# Patient Record
Sex: FEMALE | Race: WHITE | Employment: OTHER | URBAN - METROPOLITAN AREA
[De-identification: names, ages, dates, MRNs, and addresses within clinical notes are randomized per-mention and may not be internally consistent; named-entity substitution may affect disease eponyms.]

---

## 2017-03-29 ENCOUNTER — TELEPHONE (OUTPATIENT)
Dept: PULMONOLOGY | Age: 67
End: 2017-03-29

## 2018-03-05 ENCOUNTER — TELEPHONE (OUTPATIENT)
Dept: PULMONOLOGY | Age: 68
End: 2018-03-05

## 2018-03-05 DIAGNOSIS — J44.1 COPD EXACERBATION (HCC): Primary | ICD-10-CM

## 2018-04-20 ENCOUNTER — OFFICE VISIT (OUTPATIENT)
Dept: PULMONOLOGY | Age: 68
End: 2018-04-20

## 2018-04-20 ENCOUNTER — HOSPITAL ENCOUNTER (OUTPATIENT)
Dept: CARDIAC REHAB | Age: 68
Discharge: HOME | End: 2018-04-20
Attending: INTERNAL MEDICINE | Admitting: INTERNAL MEDICINE

## 2018-04-20 VITALS
DIASTOLIC BLOOD PRESSURE: 80 MMHG | BODY MASS INDEX: 33.84 KG/M2 | HEIGHT: 63 IN | SYSTOLIC BLOOD PRESSURE: 155 MMHG | TEMPERATURE: 97.8 F | RESPIRATION RATE: 16 BRPM | WEIGHT: 191 LBS | HEART RATE: 81 BPM | OXYGEN SATURATION: 95 %

## 2018-04-20 DIAGNOSIS — E55.9 VITAMIN D DEFICIENCY: ICD-10-CM

## 2018-04-20 DIAGNOSIS — F32.A FATIGUE DUE TO DEPRESSION: ICD-10-CM

## 2018-04-20 DIAGNOSIS — R91.1 PULMONARY NODULE SEEN ON IMAGING STUDY: ICD-10-CM

## 2018-04-20 DIAGNOSIS — R53.83 FATIGUE DUE TO DEPRESSION: ICD-10-CM

## 2018-04-20 DIAGNOSIS — J96.11 CHRONIC HYPOXEMIC RESPIRATORY FAILURE (HCC): ICD-10-CM

## 2018-04-20 DIAGNOSIS — G47.10 HYPERSOMNIA: ICD-10-CM

## 2018-04-20 DIAGNOSIS — Z71.6 TOBACCO ABUSE COUNSELING: ICD-10-CM

## 2018-04-20 DIAGNOSIS — J44.9 COPD, MODERATE (HCC): Primary | ICD-10-CM

## 2018-04-20 LAB
TSH REFLEX: 2.75 UIU/ML (ref 0.27–4.2)
VITAMIN D 25-HYDROXY: 21.4 NG/ML

## 2018-04-20 PROCEDURE — 4004F PT TOBACCO SCREEN RCVD TLK: CPT | Performed by: INTERNAL MEDICINE

## 2018-04-20 PROCEDURE — 4040F PNEUMOC VAC/ADMIN/RCVD: CPT | Performed by: INTERNAL MEDICINE

## 2018-04-20 PROCEDURE — G8400 PT W/DXA NO RESULTS DOC: HCPCS | Performed by: INTERNAL MEDICINE

## 2018-04-20 PROCEDURE — G8427 DOCREV CUR MEDS BY ELIG CLIN: HCPCS | Performed by: INTERNAL MEDICINE

## 2018-04-20 PROCEDURE — 3017F COLORECTAL CA SCREEN DOC REV: CPT | Performed by: INTERNAL MEDICINE

## 2018-04-20 PROCEDURE — 3014F SCREEN MAMMO DOC REV: CPT | Performed by: INTERNAL MEDICINE

## 2018-04-20 PROCEDURE — G8417 CALC BMI ABV UP PARAM F/U: HCPCS | Performed by: INTERNAL MEDICINE

## 2018-04-20 PROCEDURE — G8926 SPIRO NO PERF OR DOC: HCPCS | Performed by: INTERNAL MEDICINE

## 2018-04-20 PROCEDURE — 99214 OFFICE O/P EST MOD 30 MIN: CPT | Performed by: INTERNAL MEDICINE

## 2018-04-20 PROCEDURE — 1090F PRES/ABSN URINE INCON ASSESS: CPT | Performed by: INTERNAL MEDICINE

## 2018-04-20 PROCEDURE — 94618 PULMONARY STRESS TESTING: CPT | Performed by: INTERNAL MEDICINE

## 2018-04-20 PROCEDURE — 1123F ACP DISCUSS/DSCN MKR DOCD: CPT | Performed by: INTERNAL MEDICINE

## 2018-04-20 PROCEDURE — 3023F SPIROM DOC REV: CPT | Performed by: INTERNAL MEDICINE

## 2018-04-20 RX ORDER — AMLODIPINE BESYLATE 5 MG/1
5 TABLET ORAL DAILY
COMMUNITY

## 2018-04-20 RX ORDER — ALBUTEROL SULFATE 90 UG/1
2 AEROSOL, METERED RESPIRATORY (INHALATION) EVERY 6 HOURS PRN
COMMUNITY
End: 2019-11-30 | Stop reason: ALTCHOICE

## 2018-04-23 ENCOUNTER — TELEPHONE (OUTPATIENT)
Dept: PULMONOLOGY | Age: 68
End: 2018-04-23

## 2018-04-23 DIAGNOSIS — J44.9 COPD, MODERATE (HCC): ICD-10-CM

## 2018-04-25 ENCOUNTER — TELEPHONE (OUTPATIENT)
Dept: PULMONOLOGY | Age: 68
End: 2018-04-25

## 2018-05-15 ENCOUNTER — HOSPITAL ENCOUNTER (OUTPATIENT)
Dept: CT IMAGING | Age: 68
Discharge: HOME | End: 2018-05-15
Admitting: INTERNAL MEDICINE

## 2018-05-15 DIAGNOSIS — R91.1 PULMONARY NODULE SEEN ON IMAGING STUDY: ICD-10-CM

## 2018-05-15 DIAGNOSIS — R91.1 SOLITARY PULMONARY NODULE: ICD-10-CM

## 2018-06-03 ENCOUNTER — HOSPITAL ENCOUNTER (OUTPATIENT)
Dept: OTHER | Age: 68
Discharge: HOME | End: 2018-05-15
Attending: INTERNAL MEDICINE | Admitting: INTERNAL MEDICINE

## 2018-06-11 ENCOUNTER — TELEPHONE (OUTPATIENT)
Dept: PULMONOLOGY | Age: 68
End: 2018-06-11

## 2018-06-30 DIAGNOSIS — E55.9 VITAMIN D DEFICIENCY: ICD-10-CM

## 2018-07-02 RX ORDER — CHOLECALCIFEROL (VITAMIN D3) 1250 MCG
CAPSULE ORAL
Qty: 4 CAPSULE | Refills: 3 | Status: SHIPPED | OUTPATIENT
Start: 2018-07-02

## 2018-07-05 ENCOUNTER — TELEPHONE (OUTPATIENT)
Dept: PULMONOLOGY | Age: 68
End: 2018-07-05

## 2019-01-28 ENCOUNTER — HOSPITAL ENCOUNTER (EMERGENCY)
Age: 69
Discharge: HOME | End: 2019-01-28
Payer: COMMERCIAL

## 2019-01-28 ENCOUNTER — APPOINTMENT (OUTPATIENT)
Dept: GENERAL RADIOLOGY | Age: 69
End: 2019-01-28
Payer: COMMERCIAL

## 2019-01-28 VITALS
DIASTOLIC BLOOD PRESSURE: 74 MMHG | TEMPERATURE: 98.6 F | HEART RATE: 98 BPM | BODY MASS INDEX: 33.98 KG/M2 | OXYGEN SATURATION: 90 % | RESPIRATION RATE: 18 BRPM | SYSTOLIC BLOOD PRESSURE: 131 MMHG | WEIGHT: 191.8 LBS | HEIGHT: 63 IN

## 2019-01-28 DIAGNOSIS — R03.0 ELEVATED BLOOD PRESSURE READING: ICD-10-CM

## 2019-01-28 DIAGNOSIS — M54.6 ACUTE BILATERAL THORACIC BACK PAIN: Primary | ICD-10-CM

## 2019-01-28 DIAGNOSIS — R05.9 COUGH: ICD-10-CM

## 2019-01-28 DIAGNOSIS — R91.1 PULMONARY NODULE: ICD-10-CM

## 2019-01-28 PROCEDURE — 6370000000 HC RX 637 (ALT 250 FOR IP): Performed by: PHYSICIAN ASSISTANT

## 2019-01-28 PROCEDURE — 94664 DEMO&/EVAL PT USE INHALER: CPT

## 2019-01-28 PROCEDURE — 94640 AIRWAY INHALATION TREATMENT: CPT

## 2019-01-28 PROCEDURE — 71045 X-RAY EXAM CHEST 1 VIEW: CPT

## 2019-01-28 PROCEDURE — 99283 EMERGENCY DEPT VISIT LOW MDM: CPT

## 2019-01-28 RX ORDER — BENZONATATE 200 MG/1
200 CAPSULE ORAL 3 TIMES DAILY PRN
Qty: 20 CAPSULE | Refills: 0 | Status: SHIPPED | OUTPATIENT
Start: 2019-01-28 | End: 2019-11-30 | Stop reason: ALTCHOICE

## 2019-01-28 RX ORDER — IPRATROPIUM BROMIDE AND ALBUTEROL SULFATE 2.5; .5 MG/3ML; MG/3ML
3 SOLUTION RESPIRATORY (INHALATION) ONCE
Status: COMPLETED | OUTPATIENT
Start: 2019-01-28 | End: 2019-01-28

## 2019-01-28 RX ORDER — HYDROCODONE BITARTRATE AND ACETAMINOPHEN 5; 325 MG/1; MG/1
1 TABLET ORAL ONCE
Status: COMPLETED | OUTPATIENT
Start: 2019-01-28 | End: 2019-01-28

## 2019-01-28 RX ORDER — CYCLOBENZAPRINE HCL 10 MG
10 TABLET ORAL ONCE
Status: COMPLETED | OUTPATIENT
Start: 2019-01-28 | End: 2019-01-28

## 2019-01-28 RX ORDER — CYCLOBENZAPRINE HCL 10 MG
10 TABLET ORAL 3 TIMES DAILY PRN
Qty: 20 TABLET | Refills: 0 | Status: SHIPPED | OUTPATIENT
Start: 2019-01-28 | End: 2019-03-08

## 2019-01-28 RX ORDER — NAPROXEN 500 MG/1
500 TABLET ORAL 2 TIMES DAILY PRN
Qty: 30 TABLET | Refills: 0 | Status: SHIPPED | OUTPATIENT
Start: 2019-01-28 | End: 2019-01-28

## 2019-01-28 RX ORDER — PREDNISONE 20 MG/1
60 TABLET ORAL ONCE
Status: COMPLETED | OUTPATIENT
Start: 2019-01-28 | End: 2019-01-28

## 2019-01-28 RX ORDER — PREDNISONE 20 MG/1
40 TABLET ORAL DAILY
Qty: 8 TABLET | Refills: 0 | Status: SHIPPED | OUTPATIENT
Start: 2019-01-28 | End: 2019-02-01

## 2019-01-28 RX ADMIN — IPRATROPIUM BROMIDE AND ALBUTEROL SULFATE 3 AMPULE: .5; 3 SOLUTION RESPIRATORY (INHALATION) at 18:18

## 2019-01-28 RX ADMIN — PREDNISONE 60 MG: 20 TABLET ORAL at 18:46

## 2019-01-28 RX ADMIN — HYDROCODONE BITARTRATE AND ACETAMINOPHEN 1 TABLET: 5; 325 TABLET ORAL at 18:47

## 2019-01-28 RX ADMIN — CYCLOBENZAPRINE HYDROCHLORIDE 10 MG: 10 TABLET, FILM COATED ORAL at 18:45

## 2019-01-28 ASSESSMENT — PAIN SCALES - GENERAL
PAINLEVEL_OUTOF10: 7
PAINLEVEL_OUTOF10: 7
PAINLEVEL_OUTOF10: 9
PAINLEVEL_OUTOF10: 9

## 2019-01-28 ASSESSMENT — ENCOUNTER SYMPTOMS
VOMITING: 0
SHORTNESS OF BREATH: 1
NAUSEA: 0
COUGH: 1
ABDOMINAL PAIN: 0
WHEEZING: 1
BACK PAIN: 1

## 2019-01-28 ASSESSMENT — PAIN DESCRIPTION - PAIN TYPE: TYPE: ACUTE PAIN

## 2019-01-28 ASSESSMENT — PAIN DESCRIPTION - LOCATION: LOCATION: BACK

## 2019-01-28 ASSESSMENT — PAIN DESCRIPTION - DESCRIPTORS: DESCRIPTORS: ACHING

## 2019-02-15 ENCOUNTER — HOSPITAL ENCOUNTER (OUTPATIENT)
Age: 69
Discharge: HOME | End: 2019-02-15
Payer: COMMERCIAL

## 2019-02-15 ENCOUNTER — HOSPITAL ENCOUNTER (OUTPATIENT)
Dept: GENERAL RADIOLOGY | Age: 69
Discharge: HOME | End: 2019-02-15
Payer: COMMERCIAL

## 2019-02-15 DIAGNOSIS — M79.605 LEFT LEG PAIN: ICD-10-CM

## 2019-02-15 PROCEDURE — 73560 X-RAY EXAM OF KNEE 1 OR 2: CPT

## 2019-02-15 PROCEDURE — 73502 X-RAY EXAM HIP UNI 2-3 VIEWS: CPT

## 2019-03-08 ENCOUNTER — HOSPITAL ENCOUNTER (EMERGENCY)
Age: 69
Discharge: HOME | End: 2019-03-08
Attending: EMERGENCY MEDICINE
Payer: COMMERCIAL

## 2019-03-08 VITALS
HEIGHT: 63 IN | WEIGHT: 192.02 LBS | TEMPERATURE: 98.8 F | BODY MASS INDEX: 34.02 KG/M2 | SYSTOLIC BLOOD PRESSURE: 164 MMHG | OXYGEN SATURATION: 97 % | DIASTOLIC BLOOD PRESSURE: 78 MMHG | HEART RATE: 98 BPM

## 2019-03-08 DIAGNOSIS — M54.50 ACUTE BILATERAL LOW BACK PAIN WITHOUT SCIATICA: Primary | ICD-10-CM

## 2019-03-08 DIAGNOSIS — F41.1 ANXIETY STATE: ICD-10-CM

## 2019-03-08 LAB
A/G RATIO: 1.4 (ref 1.1–2.2)
ALBUMIN SERPL-MCNC: 4.2 G/DL (ref 3.4–5)
ALP BLD-CCNC: 166 U/L (ref 40–129)
ALT SERPL-CCNC: 19 U/L (ref 10–40)
ANION GAP SERPL CALCULATED.3IONS-SCNC: 16 MMOL/L (ref 3–16)
AST SERPL-CCNC: 19 U/L (ref 15–37)
BASOPHILS ABSOLUTE: 0 K/UL (ref 0–0.2)
BASOPHILS RELATIVE PERCENT: 0.5 %
BILIRUB SERPL-MCNC: 0.3 MG/DL (ref 0–1)
BILIRUBIN URINE: NEGATIVE
BLOOD, URINE: NEGATIVE
BUN BLDV-MCNC: 10 MG/DL (ref 7–20)
CALCIUM SERPL-MCNC: 9.4 MG/DL (ref 8.3–10.6)
CHLORIDE BLD-SCNC: 102 MMOL/L (ref 99–110)
CLARITY: CLEAR
CO2: 25 MMOL/L (ref 21–32)
COLOR: YELLOW
CREAT SERPL-MCNC: 0.8 MG/DL (ref 0.6–1.2)
EOSINOPHILS ABSOLUTE: 0.1 K/UL (ref 0–0.6)
EOSINOPHILS RELATIVE PERCENT: 1.5 %
GFR AFRICAN AMERICAN: >60
GFR NON-AFRICAN AMERICAN: >60
GLOBULIN: 3.1 G/DL
GLUCOSE BLD-MCNC: 105 MG/DL (ref 70–99)
GLUCOSE URINE: NEGATIVE MG/DL
HCT VFR BLD CALC: 39.8 % (ref 36–48)
HEMOGLOBIN: 13.5 G/DL (ref 12–16)
KETONES, URINE: NEGATIVE MG/DL
LEUKOCYTE ESTERASE, URINE: NEGATIVE
LYMPHOCYTES ABSOLUTE: 0.7 K/UL (ref 1–5.1)
LYMPHOCYTES RELATIVE PERCENT: 15.5 %
MCH RBC QN AUTO: 31.8 PG (ref 26–34)
MCHC RBC AUTO-ENTMCNC: 33.9 G/DL (ref 31–36)
MCV RBC AUTO: 94 FL (ref 80–100)
MICROSCOPIC EXAMINATION: NORMAL
MONOCYTES ABSOLUTE: 0.5 K/UL (ref 0–1.3)
MONOCYTES RELATIVE PERCENT: 10.5 %
NEUTROPHILS ABSOLUTE: 3.3 K/UL (ref 1.7–7.7)
NEUTROPHILS RELATIVE PERCENT: 72 %
NITRITE, URINE: NEGATIVE
PDW BLD-RTO: 13.6 % (ref 12.4–15.4)
PH UA: 6 (ref 5–8)
PLATELET # BLD: 248 K/UL (ref 135–450)
PMV BLD AUTO: 7.7 FL (ref 5–10.5)
POTASSIUM SERPL-SCNC: 4.2 MMOL/L (ref 3.5–5.1)
PROTEIN UA: NEGATIVE MG/DL
RBC # BLD: 4.24 M/UL (ref 4–5.2)
SODIUM BLD-SCNC: 143 MMOL/L (ref 136–145)
SPECIFIC GRAVITY UA: 1.02 (ref 1–1.03)
TOTAL PROTEIN: 7.3 G/DL (ref 6.4–8.2)
URINE REFLEX TO CULTURE: NORMAL
URINE TYPE: NORMAL
UROBILINOGEN, URINE: 0.2 E.U./DL
WBC # BLD: 4.6 K/UL (ref 4–11)

## 2019-03-08 PROCEDURE — 81003 URINALYSIS AUTO W/O SCOPE: CPT

## 2019-03-08 PROCEDURE — 36415 COLL VENOUS BLD VENIPUNCTURE: CPT

## 2019-03-08 PROCEDURE — 6360000002 HC RX W HCPCS: Performed by: EMERGENCY MEDICINE

## 2019-03-08 PROCEDURE — 80053 COMPREHEN METABOLIC PANEL: CPT

## 2019-03-08 PROCEDURE — 99283 EMERGENCY DEPT VISIT LOW MDM: CPT

## 2019-03-08 PROCEDURE — 85025 COMPLETE CBC W/AUTO DIFF WBC: CPT

## 2019-03-08 PROCEDURE — 96372 THER/PROPH/DIAG INJ SC/IM: CPT

## 2019-03-08 RX ORDER — POLYETHYLENE GLYCOL 3350 17 G/17G
17 POWDER, FOR SOLUTION ORAL DAILY PRN
Qty: 527 G | Refills: 0 | Status: SHIPPED | OUTPATIENT
Start: 2019-03-08 | End: 2019-04-07

## 2019-03-08 RX ORDER — KETOROLAC TROMETHAMINE 30 MG/ML
60 INJECTION, SOLUTION INTRAMUSCULAR; INTRAVENOUS ONCE
Status: COMPLETED | OUTPATIENT
Start: 2019-03-08 | End: 2019-03-08

## 2019-03-08 RX ORDER — METHOCARBAMOL 750 MG/1
750 TABLET, FILM COATED ORAL 3 TIMES DAILY PRN
Qty: 15 TABLET | Refills: 0 | Status: SHIPPED | OUTPATIENT
Start: 2019-03-08 | End: 2019-03-13

## 2019-03-08 RX ADMIN — KETOROLAC TROMETHAMINE 60 MG: 30 INJECTION, SOLUTION INTRAMUSCULAR at 12:32

## 2019-03-08 ASSESSMENT — PAIN DESCRIPTION - ONSET: ONSET: GRADUAL

## 2019-03-08 ASSESSMENT — PAIN SCALES - GENERAL
PAINLEVEL_OUTOF10: 8
PAINLEVEL_OUTOF10: 8
PAINLEVEL_OUTOF10: 3

## 2019-03-08 ASSESSMENT — PAIN DESCRIPTION - LOCATION: LOCATION: FLANK

## 2019-03-08 ASSESSMENT — PAIN DESCRIPTION - PROGRESSION: CLINICAL_PROGRESSION: GRADUALLY WORSENING

## 2019-03-08 ASSESSMENT — PAIN DESCRIPTION - PAIN TYPE: TYPE: ACUTE PAIN

## 2019-03-08 ASSESSMENT — PAIN DESCRIPTION - FREQUENCY: FREQUENCY: CONTINUOUS

## 2019-03-08 ASSESSMENT — PAIN DESCRIPTION - DESCRIPTORS: DESCRIPTORS: ACHING

## 2019-11-30 ENCOUNTER — HOSPITAL ENCOUNTER (EMERGENCY)
Age: 69
Discharge: HOME | End: 2019-11-30
Attending: EMERGENCY MEDICINE
Payer: MEDICAID

## 2019-11-30 ENCOUNTER — APPOINTMENT (OUTPATIENT)
Dept: GENERAL RADIOLOGY | Age: 69
End: 2019-11-30
Payer: MEDICAID

## 2019-11-30 VITALS
OXYGEN SATURATION: 97 % | HEART RATE: 88 BPM | WEIGHT: 190.92 LBS | RESPIRATION RATE: 16 BRPM | TEMPERATURE: 98.4 F | DIASTOLIC BLOOD PRESSURE: 86 MMHG | BODY MASS INDEX: 33.82 KG/M2 | SYSTOLIC BLOOD PRESSURE: 134 MMHG

## 2019-11-30 DIAGNOSIS — J44.1 ACUTE EXACERBATION OF CHRONIC OBSTRUCTIVE PULMONARY DISEASE (COPD) (HCC): Primary | ICD-10-CM

## 2019-11-30 LAB
ANION GAP SERPL CALCULATED.3IONS-SCNC: 11 MMOL/L (ref 3–16)
BASOPHILS ABSOLUTE: 0 K/UL (ref 0–0.2)
BASOPHILS RELATIVE PERCENT: 0.5 %
BUN BLDV-MCNC: 14 MG/DL (ref 7–20)
CALCIUM SERPL-MCNC: 9.8 MG/DL (ref 8.3–10.6)
CHLORIDE BLD-SCNC: 101 MMOL/L (ref 99–110)
CO2: 27 MMOL/L (ref 21–32)
CREAT SERPL-MCNC: 0.8 MG/DL (ref 0.6–1.2)
EOSINOPHILS ABSOLUTE: 0.1 K/UL (ref 0–0.6)
EOSINOPHILS RELATIVE PERCENT: 1.2 %
GFR AFRICAN AMERICAN: >60
GFR NON-AFRICAN AMERICAN: >60
GLUCOSE BLD-MCNC: 99 MG/DL (ref 70–99)
HCT VFR BLD CALC: 39.3 % (ref 36–48)
HEMOGLOBIN: 13.2 G/DL (ref 12–16)
LYMPHOCYTES ABSOLUTE: 0.8 K/UL (ref 1–5.1)
LYMPHOCYTES RELATIVE PERCENT: 12.1 %
MCH RBC QN AUTO: 31.2 PG (ref 26–34)
MCHC RBC AUTO-ENTMCNC: 33.7 G/DL (ref 31–36)
MCV RBC AUTO: 92.7 FL (ref 80–100)
MONOCYTES ABSOLUTE: 0.5 K/UL (ref 0–1.3)
MONOCYTES RELATIVE PERCENT: 7.2 %
NEUTROPHILS ABSOLUTE: 5 K/UL (ref 1.7–7.7)
NEUTROPHILS RELATIVE PERCENT: 79 %
PDW BLD-RTO: 14.5 % (ref 12.4–15.4)
PLATELET # BLD: 202 K/UL (ref 135–450)
PMV BLD AUTO: 7.7 FL (ref 5–10.5)
POTASSIUM REFLEX MAGNESIUM: 4.7 MMOL/L (ref 3.5–5.1)
PRO-BNP: 180 PG/ML (ref 0–124)
RAPID INFLUENZA  B AGN: NEGATIVE
RAPID INFLUENZA A AGN: NEGATIVE
RBC # BLD: 4.24 M/UL (ref 4–5.2)
SODIUM BLD-SCNC: 139 MMOL/L (ref 136–145)
TROPONIN: <0.01 NG/ML
WBC # BLD: 6.4 K/UL (ref 4–11)

## 2019-11-30 PROCEDURE — 85025 COMPLETE CBC W/AUTO DIFF WBC: CPT

## 2019-11-30 PROCEDURE — 71046 X-RAY EXAM CHEST 2 VIEWS: CPT

## 2019-11-30 PROCEDURE — 80048 BASIC METABOLIC PNL TOTAL CA: CPT

## 2019-11-30 PROCEDURE — 6370000000 HC RX 637 (ALT 250 FOR IP): Performed by: EMERGENCY MEDICINE

## 2019-11-30 PROCEDURE — 84484 ASSAY OF TROPONIN QUANT: CPT

## 2019-11-30 PROCEDURE — 87804 INFLUENZA ASSAY W/OPTIC: CPT

## 2019-11-30 PROCEDURE — 93005 ELECTROCARDIOGRAM TRACING: CPT | Performed by: EMERGENCY MEDICINE

## 2019-11-30 PROCEDURE — 99284 EMERGENCY DEPT VISIT MOD MDM: CPT

## 2019-11-30 PROCEDURE — 36415 COLL VENOUS BLD VENIPUNCTURE: CPT

## 2019-11-30 PROCEDURE — 83880 ASSAY OF NATRIURETIC PEPTIDE: CPT

## 2019-11-30 RX ORDER — IPRATROPIUM BROMIDE AND ALBUTEROL SULFATE 2.5; .5 MG/3ML; MG/3ML
1 SOLUTION RESPIRATORY (INHALATION) ONCE
Status: COMPLETED | OUTPATIENT
Start: 2019-11-30 | End: 2019-11-30

## 2019-11-30 RX ORDER — ALBUTEROL SULFATE 90 UG/1
2 AEROSOL, METERED RESPIRATORY (INHALATION) 4 TIMES DAILY PRN
Qty: 1 INHALER | Refills: 0 | Status: SHIPPED | OUTPATIENT
Start: 2019-11-30 | End: 2020-08-06 | Stop reason: SDUPTHER

## 2019-11-30 RX ORDER — PREDNISONE 20 MG/1
40 TABLET ORAL DAILY
Qty: 8 TABLET | Refills: 0 | Status: SHIPPED | OUTPATIENT
Start: 2019-11-30 | End: 2019-12-04

## 2019-11-30 RX ORDER — PREDNISONE 20 MG/1
60 TABLET ORAL ONCE
Status: COMPLETED | OUTPATIENT
Start: 2019-11-30 | End: 2019-11-30

## 2019-11-30 RX ORDER — BENZONATATE 100 MG/1
100 CAPSULE ORAL 3 TIMES DAILY PRN
Qty: 30 CAPSULE | Refills: 0 | Status: SHIPPED | OUTPATIENT
Start: 2019-11-30 | End: 2019-12-07

## 2019-11-30 RX ORDER — DOXYCYCLINE 100 MG/1
100 TABLET ORAL 2 TIMES DAILY
Qty: 20 TABLET | Refills: 0 | Status: SHIPPED | OUTPATIENT
Start: 2019-11-30 | End: 2019-12-10

## 2019-11-30 RX ADMIN — IPRATROPIUM BROMIDE AND ALBUTEROL SULFATE 1 AMPULE: 2.5; .5 SOLUTION RESPIRATORY (INHALATION) at 11:46

## 2019-11-30 RX ADMIN — IPRATROPIUM BROMIDE AND ALBUTEROL SULFATE 1 AMPULE: .5; 3 SOLUTION RESPIRATORY (INHALATION) at 13:15

## 2019-11-30 RX ADMIN — PREDNISONE 60 MG: 20 TABLET ORAL at 13:07

## 2019-11-30 ASSESSMENT — PAIN SCALES - GENERAL
PAINLEVEL_OUTOF10: 8
PAINLEVEL_OUTOF10: 6

## 2019-11-30 ASSESSMENT — PAIN DESCRIPTION - ORIENTATION
ORIENTATION: LEFT

## 2019-11-30 ASSESSMENT — PAIN DESCRIPTION - LOCATION
LOCATION: RIB CAGE

## 2019-11-30 ASSESSMENT — PAIN DESCRIPTION - DESCRIPTORS
DESCRIPTORS: TENDER

## 2019-12-01 LAB
EKG ATRIAL RATE: 100 BPM
EKG DIAGNOSIS: NORMAL
EKG P AXIS: 49 DEGREES
EKG P-R INTERVAL: 146 MS
EKG Q-T INTERVAL: 376 MS
EKG QRS DURATION: 124 MS
EKG QTC CALCULATION (BAZETT): 485 MS
EKG R AXIS: 85 DEGREES
EKG T AXIS: 11 DEGREES
EKG VENTRICULAR RATE: 100 BPM

## 2019-12-01 PROCEDURE — 93010 ELECTROCARDIOGRAM REPORT: CPT | Performed by: INTERNAL MEDICINE

## 2020-01-09 ENCOUNTER — HOSPITAL ENCOUNTER (OUTPATIENT)
Dept: CT IMAGING | Age: 70
Discharge: HOME | End: 2020-01-09
Payer: COMMERCIAL

## 2020-01-09 PROCEDURE — 71250 CT THORAX DX C-: CPT

## 2020-08-06 ENCOUNTER — APPOINTMENT (OUTPATIENT)
Dept: CT IMAGING | Age: 70
End: 2020-08-06
Payer: MEDICAID

## 2020-08-06 ENCOUNTER — HOSPITAL ENCOUNTER (EMERGENCY)
Age: 70
Discharge: HOME | End: 2020-08-06
Attending: EMERGENCY MEDICINE
Payer: MEDICAID

## 2020-08-06 ENCOUNTER — APPOINTMENT (OUTPATIENT)
Dept: GENERAL RADIOLOGY | Age: 70
End: 2020-08-06
Payer: MEDICAID

## 2020-08-06 VITALS
RESPIRATION RATE: 20 BRPM | BODY MASS INDEX: 34.54 KG/M2 | HEART RATE: 94 BPM | OXYGEN SATURATION: 92 % | WEIGHT: 195 LBS | DIASTOLIC BLOOD PRESSURE: 75 MMHG | SYSTOLIC BLOOD PRESSURE: 149 MMHG | TEMPERATURE: 98.3 F

## 2020-08-06 LAB
A/G RATIO: 1.4 (ref 1.1–2.2)
ALBUMIN SERPL-MCNC: 4.1 G/DL (ref 3.4–5)
ALP BLD-CCNC: 130 U/L (ref 40–129)
ALT SERPL-CCNC: 13 U/L (ref 10–40)
ANION GAP SERPL CALCULATED.3IONS-SCNC: 10 MMOL/L (ref 3–16)
AST SERPL-CCNC: 13 U/L (ref 15–37)
BASOPHILS ABSOLUTE: 0 K/UL (ref 0–0.2)
BASOPHILS RELATIVE PERCENT: 0.8 %
BILIRUB SERPL-MCNC: 0.3 MG/DL (ref 0–1)
BUN BLDV-MCNC: 14 MG/DL (ref 7–20)
CALCIUM SERPL-MCNC: 9.9 MG/DL (ref 8.3–10.6)
CHLORIDE BLD-SCNC: 101 MMOL/L (ref 99–110)
CO2: 32 MMOL/L (ref 21–32)
CREAT SERPL-MCNC: 0.9 MG/DL (ref 0.6–1.2)
EKG ATRIAL RATE: 92 BPM
EKG DIAGNOSIS: NORMAL
EKG P AXIS: 56 DEGREES
EKG P-R INTERVAL: 140 MS
EKG Q-T INTERVAL: 394 MS
EKG QRS DURATION: 124 MS
EKG QTC CALCULATION (BAZETT): 487 MS
EKG R AXIS: 75 DEGREES
EKG T AXIS: 21 DEGREES
EKG VENTRICULAR RATE: 92 BPM
EOSINOPHILS ABSOLUTE: 0.1 K/UL (ref 0–0.6)
EOSINOPHILS RELATIVE PERCENT: 1.9 %
GFR AFRICAN AMERICAN: >60
GFR NON-AFRICAN AMERICAN: >60
GLOBULIN: 2.9 G/DL
GLUCOSE BLD-MCNC: 116 MG/DL (ref 70–99)
HCT VFR BLD CALC: 39.2 % (ref 36–48)
HEMOGLOBIN: 13.6 G/DL (ref 12–16)
LACTIC ACID: 1.7 MMOL/L (ref 0.4–2)
LYMPHOCYTES ABSOLUTE: 0.7 K/UL (ref 1–5.1)
LYMPHOCYTES RELATIVE PERCENT: 12.4 %
MCH RBC QN AUTO: 33.2 PG (ref 26–34)
MCHC RBC AUTO-ENTMCNC: 34.7 G/DL (ref 31–36)
MCV RBC AUTO: 95.7 FL (ref 80–100)
MONOCYTES ABSOLUTE: 0.4 K/UL (ref 0–1.3)
MONOCYTES RELATIVE PERCENT: 6.9 %
NEUTROPHILS ABSOLUTE: 4.6 K/UL (ref 1.7–7.7)
NEUTROPHILS RELATIVE PERCENT: 78 %
PDW BLD-RTO: 13.3 % (ref 12.4–15.4)
PLATELET # BLD: 149 K/UL (ref 135–450)
PMV BLD AUTO: 8.4 FL (ref 5–10.5)
POTASSIUM REFLEX MAGNESIUM: 4.5 MMOL/L (ref 3.5–5.1)
PRO-BNP: 167 PG/ML (ref 0–124)
RBC # BLD: 4.1 M/UL (ref 4–5.2)
SODIUM BLD-SCNC: 143 MMOL/L (ref 136–145)
TOTAL PROTEIN: 7 G/DL (ref 6.4–8.2)
TROPONIN: 0.01 NG/ML
WBC # BLD: 5.8 K/UL (ref 4–11)

## 2020-08-06 PROCEDURE — U0003 INFECTIOUS AGENT DETECTION BY NUCLEIC ACID (DNA OR RNA); SEVERE ACUTE RESPIRATORY SYNDROME CORONAVIRUS 2 (SARS-COV-2) (CORONAVIRUS DISEASE [COVID-19]), AMPLIFIED PROBE TECHNIQUE, MAKING USE OF HIGH THROUGHPUT TECHNOLOGIES AS DESCRIBED BY CMS-2020-01-R: HCPCS

## 2020-08-06 PROCEDURE — 93005 ELECTROCARDIOGRAM TRACING: CPT | Performed by: EMERGENCY MEDICINE

## 2020-08-06 PROCEDURE — 80053 COMPREHEN METABOLIC PANEL: CPT

## 2020-08-06 PROCEDURE — 36415 COLL VENOUS BLD VENIPUNCTURE: CPT

## 2020-08-06 PROCEDURE — 6370000000 HC RX 637 (ALT 250 FOR IP): Performed by: EMERGENCY MEDICINE

## 2020-08-06 PROCEDURE — 83605 ASSAY OF LACTIC ACID: CPT

## 2020-08-06 PROCEDURE — 93010 ELECTROCARDIOGRAM REPORT: CPT | Performed by: INTERNAL MEDICINE

## 2020-08-06 PROCEDURE — 99291 CRITICAL CARE FIRST HOUR: CPT

## 2020-08-06 PROCEDURE — 6360000004 HC RX CONTRAST MEDICATION: Performed by: EMERGENCY MEDICINE

## 2020-08-06 PROCEDURE — 71045 X-RAY EXAM CHEST 1 VIEW: CPT

## 2020-08-06 PROCEDURE — 71260 CT THORAX DX C+: CPT

## 2020-08-06 PROCEDURE — 87040 BLOOD CULTURE FOR BACTERIA: CPT

## 2020-08-06 PROCEDURE — 85025 COMPLETE CBC W/AUTO DIFF WBC: CPT

## 2020-08-06 PROCEDURE — 83880 ASSAY OF NATRIURETIC PEPTIDE: CPT

## 2020-08-06 PROCEDURE — 84484 ASSAY OF TROPONIN QUANT: CPT

## 2020-08-06 RX ORDER — IPRATROPIUM BROMIDE AND ALBUTEROL SULFATE 2.5; .5 MG/3ML; MG/3ML
3 SOLUTION RESPIRATORY (INHALATION) ONCE
Status: COMPLETED | OUTPATIENT
Start: 2020-08-06 | End: 2020-08-06

## 2020-08-06 RX ORDER — GUAIFENESIN AND CODEINE PHOSPHATE 100; 10 MG/5ML; MG/5ML
5 SOLUTION ORAL 4 TIMES DAILY PRN
Qty: 120 ML | Refills: 0 | Status: SHIPPED | OUTPATIENT
Start: 2020-08-06 | End: 2020-08-09

## 2020-08-06 RX ORDER — ALBUTEROL SULFATE 90 UG/1
2 AEROSOL, METERED RESPIRATORY (INHALATION) 4 TIMES DAILY PRN
Qty: 1 INHALER | Refills: 0 | Status: SHIPPED | OUTPATIENT
Start: 2020-08-06

## 2020-08-06 RX ORDER — PREDNISONE 20 MG/1
60 TABLET ORAL ONCE
Status: COMPLETED | OUTPATIENT
Start: 2020-08-06 | End: 2020-08-06

## 2020-08-06 RX ORDER — AZITHROMYCIN 250 MG/1
TABLET, FILM COATED ORAL
Qty: 1 PACKET | Refills: 0 | Status: SHIPPED | OUTPATIENT
Start: 2020-08-06 | End: 2021-03-13 | Stop reason: ALTCHOICE

## 2020-08-06 RX ORDER — PREDNISONE 10 MG/1
60 TABLET ORAL DAILY
Qty: 24 TABLET | Refills: 0 | Status: SHIPPED | OUTPATIENT
Start: 2020-08-06 | End: 2020-08-10

## 2020-08-06 RX ADMIN — IPRATROPIUM BROMIDE AND ALBUTEROL SULFATE 3 AMPULE: .5; 3 SOLUTION RESPIRATORY (INHALATION) at 10:10

## 2020-08-06 RX ADMIN — PREDNISONE 60 MG: 20 TABLET ORAL at 10:00

## 2020-08-06 RX ADMIN — IOPAMIDOL 100 ML: 755 INJECTION, SOLUTION INTRAVENOUS at 11:19

## 2020-08-06 ASSESSMENT — PAIN DESCRIPTION - PROGRESSION: CLINICAL_PROGRESSION: GRADUALLY WORSENING

## 2020-08-06 ASSESSMENT — PAIN DESCRIPTION - PAIN TYPE: TYPE: ACUTE PAIN

## 2020-08-06 ASSESSMENT — PAIN DESCRIPTION - ORIENTATION: ORIENTATION: LEFT

## 2020-08-06 ASSESSMENT — PAIN DESCRIPTION - ONSET: ONSET: PROGRESSIVE

## 2020-08-06 ASSESSMENT — PAIN DESCRIPTION - DESCRIPTORS: DESCRIPTORS: ACHING

## 2020-08-06 ASSESSMENT — PAIN SCALES - GENERAL: PAINLEVEL_OUTOF10: 4

## 2020-08-06 ASSESSMENT — PAIN DESCRIPTION - LOCATION: LOCATION: RIB CAGE

## 2020-08-06 NOTE — ED PROVIDER NOTES
Emergency Physician Note    Chief Complaint  Shortness of Breath (pt c/o SOB increasing past few days with productive cough)       History of Present Illness  Mendel Mire is a 72 y.o. female who presents to the ED for shortness of breath. Patient reports last few days she has had increasing shortness of breath. She is supposed to be on oxygen 24/7 but she rarely uses it. She states her cough is productive. She denies any fevers, chills or sweats. She does have some left-sided chest pain. She feels like the chest pain came on after coughing she denies any exposure to COVID-19. No vomiting or diarrhea. 10 systems reviewed, pertinent positives per HPI otherwise noted to be negative    I have reviewed the following from the nursing documentation:      Prior to Admission medications    Medication Sig Start Date End Date Taking?  Authorizing Provider   albuterol sulfate  (90 Base) MCG/ACT inhaler Inhale 2 puffs into the lungs 4 times daily as needed for Wheezing 11/30/19  Yes Chen Tidwell,    Cholecalciferol (VITAMIN D3) 62017 units CAPS TAKE ONE CAPSULE BY MOUTH ONCE A WEEK 7/2/18   SAULO Dykes - CNP   Roflumilast (DALIRESP) 500 MCG tablet Take 500 mcg by mouth daily 4/23/18   Gerhardt Lima, MD   Fluticasone Furoate-Vilanterol (BREO ELLIPTA IN) Inhale into the lungs    Historical Provider, MD   Umeclidinium Bromide (INCRUSE ELLIPTA IN) Inhale into the lungs    Historical Provider, MD   amLODIPine (NORVASC) 5 MG tablet Take 5 mg by mouth daily    Historical Provider, MD   Nicotine (Ranelle Quirk TD) Place onto the skin    Historical Provider, MD   DULoxetine (CYMBALTA) 60 MG extended release capsule Take 60 mg by mouth daily    Historical Provider, MD   vitamin D (CHOLECALCIFEROL) 1000 UNIT TABS tablet Take 1 tablet by mouth daily 3/28/17   Danni Harding MD   Multiple Vitamins-Minerals (CENTROVITE) TABS Take 1 tablet by mouth daily 2/6/17   Historical Provider, MD   clonazePAM (KLONOPIN) 1 MG tablet Take 1 tablet by mouth 2 times daily 3/2/17   Historical Provider, MD   Calcium Carbonate-Vitamin D 500-400 MG-UNIT TABS Take 1 tablet by mouth daily 3/2/17   Historical Provider, MD   SYMBICORT 160-4.5 MCG/ACT AERO Take 1 puff by mouth every 4 hours as needed 17   Historical Provider, MD [employer] 400 MCG/ACT AEPB inhaler Take 1 puff by mouth daily as needed 17   Historical Provider, MD   acetaminophen (APAP EXTRA STRENGTH) 500 MG tablet Take 2 tablets by mouth every 6 hours as needed for Pain 16   Megan [place], APRN - CNP   atorvastatin (LIPITOR) 40 MG tablet Take 40 mg by mouth daily    Historical Provider, MD       Allergies as of 2020    (No Known Allergies)       Past Medical History:   Diagnosis Date    COPD, mild ([facility] [address]     Depression     Hypercholesteremia     Hypertension         Surgical History:   Past Surgical History:   Procedure Laterality Date    APPENDECTOMY       SECTION      CHOLECYSTECTOMY      HYSTERECTOMY      KIDNEY REMOVAL          Family History:    Family History   Problem Relation Age of Onset    Cancer Mother         brain and throat    Heart Attack Father        Social History     Socioeconomic History    Marital status: Single     Spouse name: Not on file    Number of children: Not on file    Years of education: Not on file    Highest education level: Not on file   Occupational History    Not on file   Social Needs    Financial resource strain: Not on file    Food insecurity     Worry: Not on file     Inability: Not on file    Transportation needs     Medical: Not on file     Non-medical: Not on file   Tobacco Use    Smoking status: Current Every Day Smoker     Packs/day: 0.25     Years: 50.00     Pack years: 12.50     Types: Cigarettes    Smokeless tobacco: Current User   Substance and Sexual Activity    Alcohol use: Yes     Comment: weekly    Drug use: No    Sexual activity: Not on file   Lifestyle  Physical activity     Days per week: Not on file     Minutes per session: Not on file    Stress: Not on file   Relationships    Social connections     Talks on phone: Not on file     Gets together: Not on file     Attends Zoroastrianism service: Not on file     Active member of club or organization: Not on file     Attends meetings of clubs or organizations: Not on file     Relationship status: Not on file    Intimate partner violence     Fear of current or ex partner: Not on file     Emotionally abused: Not on file     Physically abused: Not on file     Forced sexual activity: Not on file   Other Topics Concern    Not on file   Social History Narrative    Not on file       Nursing notes reviewed. ED Triage Vitals [08/06/20 0906]   Enc Vitals Group      BP (!) 152/81      Pulse 104      Resp 20      Temp 98.3 °F (36.8 °C)      Temp Source Oral      SpO2 (!) 86 %      Weight 195 lb (88.5 kg)      Height       Head Circumference       Peak Flow       Pain Score       Pain Loc       Pain Edu? Excl. in [address]? GENERAL:  Awake, alert. Well developed, well nourished with no respiratory distress. HENT:  Normocephalic, Atraumatic, moist mucous membranes. EYES:  Pupils equal round and reactive to light, Conjunctiva normal, extraocular movements normal.  NECK:  No meningeal signs, Supple. CHEST: Tachycardic and regular, chest wall non-tender. LUNGS: Poor air exchange with scattered wheezes and rhonchi bilaterally. ABDOMEN:  Soft, non-tender, no rebound, rigidity or guarding, non-distended, normal bowel sounds. No costovertebral angle tenderness to palpation. BACK:  No tenderness. EXTREMITIES:  Normal range of motion, no edema, no bony tenderness, no deformity, distal pulses present. SKIN: Warm, dry and intact. NEUROLOGIC: Normal mental status. Moving all extremities to command.        LABS and DIAGNOSTIC RESULTS  EKG  The Ekg interpreted by me shows  normal sinus rhythm with a rate of 92  Axis is Normal  QTc is  normal  RBBB    ST Segments: normal  Delta waves, Brugada Syndrome, and Short AL are not present. No significant change from prior EKG dated 11/30/19    RADIOLOGY  X-RAYS:  I have reviewed radiologic plain film image(s). ALL OTHER NON-PLAIN FILM IMAGES SUCH AS CT, ULTRASOUND AND MRI HAVE BEEN READ BY THE RADIOLOGIST. CT CHEST PULMONARY EMBOLISM W CONTRAST   Final Result   No evidence of pulmonary embolism or acute pulmonary abnormality. XR CHEST PORTABLE   Final Result   No radiographic evidence of acute cardiopulmonary disease. Hyperinflation compatible with COPD.               LABS  Labs Reviewed   CBC WITH AUTO DIFFERENTIAL - Abnormal; Notable for the following components:       Result Value    Lymphocytes Absolute 0.7 (*)     All other components within normal limits    Narrative:     Performed at:  [facility]  [address] [place], [place]   Phone [phone]   COMPREHENSIVE METABOLIC PANEL W/ REFLEX TO MG FOR LOW K - Abnormal; Notable for the following components:    Glucose 116 (*)     Alkaline Phosphatase 130 (*)     AST 13 (*)     All other components within normal limits    Narrative:     Performed at:  [facility]  [address] [place], [place]   Phone [phone]   BRAIN NATRIURETIC PEPTIDE - Abnormal; Notable for the following components:    Pro- (*)     All other components within normal limits    Narrative:     Performed at:  [facility]  [address] [place], [place]   Phone [phone]   CULTURE, BLOOD 1   CULTURE, BLOOD 2   TROPONIN    Narrative:     Performed at:  [facility]  [address] [place], [place]   Phone [phone]   LACTIC ACID, PLASMA    Narrative:     Performed at:  [address] predniSONE (DELTASONE) 10 MG tablet Take 6 tablets by mouth daily for 4 days, Disp-24 tablet,R-0Print      guaiFENesin-codeine (TUSSI-ORGANIDIN NR) 100-10 MG/5ML syrup Take 5 mLs by mouth 4 times daily as needed for Cough for up to 3 days. , Disp-120 mL,R-0Print             Disposition  Pt is in good condition upon Discharge to home. This chart was generated using the [address] [place] [address] [address] dictation system. I created this record but it may contain dictation errors.           Maggie Jacinto MD  08/06/20 2332

## 2020-08-07 ENCOUNTER — CARE COORDINATION (OUTPATIENT)
Dept: CASE MANAGEMENT | Age: 70
End: 2020-08-07

## 2020-08-07 LAB — SARS-COV-2, NAA: NOT DETECTED

## 2020-08-07 NOTE — ED NOTES
Received call from [facility] @ 8:31 8/7/2020 requesting albuterol prescription strenght be changed to 2.5mg instead of 5 mg. Dr. Suzan Roth agreeable to change.      Morris Rodriguez RN  08/07/20 9947

## 2020-08-08 ENCOUNTER — CARE COORDINATION (OUTPATIENT)
Dept: CASE MANAGEMENT | Age: 70
End: 2020-08-08

## 2020-08-08 NOTE — CARE COORDINATION
Ambulatory Care Manager contacted the patient by telephone to perform post discharge assessment. Verified name and  with patient as identifiers. Provided introduction to self, and explanation of the ACM role, and reason for call due to risk factors for infection and/or exposure to COVID-19. Patient contacted regarding Sophie Alan. Discussed COVID-19 related testing which was available at this time. Test results were negative. Patient informed of results, if available? Yes      Symptoms reviewed with patient who verbalized the following symptoms: cough, shortness of breath, no new symptoms, no worsening symptoms and patient reports feeling better today. Due to no new or worsening symptoms encounter was not routed to provider for escalation. Discussed follow-up appointments. If no appointment was previously scheduled, appointment scheduling offered: Patient agrees to contact her PCP on Monday to schedule a f/u appointment  [facility]  follow up appointment(s): No future appointments. Non-[facility] follow up appointment(s):      Patient has following risk factors of: COPD, pneumonia and acute respiratory failure. ACM reviewed discharge instructions, medical action plan and red flags such as increased shortness of breath, increasing fever and signs of decompensation with patient who verbalized understanding. Discussed exposure protocols and quarantine with CDC Guidelines What to do if you are sick with coronavirus disease .  Patient was given an opportunity for questions and concerns. The patient agrees to contact the [facility] exposure line [phone], [facility] department [place] Department [facility] Health: ([phone]) and PCP office for questions related to their healthcare. CTN/ACM provided contact information for future needs.     Reviewed and educated patient on any new and changed medications related to discharge diagnosis     Patient not given information for [facility]  patient ended call d/t visitor before reviewed    Advance Care Planning:   Does patient have an Advance Directive:  patient ended call d/t visitor before this information could be reviewed. .     Plan for follow-up call in 5-7 days based on symptoms and risk factors.

## 2020-08-10 LAB
BLOOD CULTURE, ROUTINE: NORMAL
CULTURE, BLOOD 2: NORMAL

## 2020-08-13 ENCOUNTER — CARE COORDINATION (OUTPATIENT)
Dept: CASE MANAGEMENT | Age: 70
End: 2020-08-13

## 2020-08-13 NOTE — CARE COORDINATION
Patient resolved from the Care Transitions episode on 8/13/2020    Patient has been provided the following resources and education related to COVID-19:                         Signs, symptoms and red flags related to COVID-19            CDC exposure and quarantine guidelines            [facility] exposure contact - [phone]            Contact for their local Department of Health                 Patient currently reports that the following symptoms have improved:  cough, shortness of breath and no new/worsening symptoms     No further outreach scheduled with this ACM. Episode of Care resolved. Patient has this ACM contact information if future needs arise.

## 2020-11-13 ENCOUNTER — HOSPITAL ENCOUNTER (EMERGENCY)
Age: 70
Discharge: HOME | End: 2020-11-13
Attending: EMERGENCY MEDICINE
Payer: MEDICAID

## 2020-11-13 ENCOUNTER — APPOINTMENT (OUTPATIENT)
Dept: GENERAL RADIOLOGY | Age: 70
End: 2020-11-13
Payer: MEDICAID

## 2020-11-13 VITALS
BODY MASS INDEX: 34.26 KG/M2 | HEART RATE: 86 BPM | OXYGEN SATURATION: 99 % | HEIGHT: 63 IN | DIASTOLIC BLOOD PRESSURE: 73 MMHG | RESPIRATION RATE: 23 BRPM | TEMPERATURE: 97.9 F | SYSTOLIC BLOOD PRESSURE: 135 MMHG | WEIGHT: 193.34 LBS

## 2020-11-13 LAB
A/G RATIO: 1.5 (ref 1.1–2.2)
ALBUMIN SERPL-MCNC: 4.3 G/DL (ref 3.4–5)
ALP BLD-CCNC: 156 U/L (ref 40–129)
ALT SERPL-CCNC: 17 U/L (ref 10–40)
ANION GAP SERPL CALCULATED.3IONS-SCNC: 12 MMOL/L (ref 3–16)
AST SERPL-CCNC: 17 U/L (ref 15–37)
BASOPHILS ABSOLUTE: 0 K/UL (ref 0–0.2)
BASOPHILS RELATIVE PERCENT: 0.4 %
BILIRUB SERPL-MCNC: 0.4 MG/DL (ref 0–1)
BILIRUBIN URINE: NEGATIVE
BLOOD, URINE: NEGATIVE
BUN BLDV-MCNC: 12 MG/DL (ref 7–20)
CALCIUM SERPL-MCNC: 9.4 MG/DL (ref 8.3–10.6)
CHLORIDE BLD-SCNC: 104 MMOL/L (ref 99–110)
CLARITY: CLEAR
CO2: 25 MMOL/L (ref 21–32)
COLOR: YELLOW
CREAT SERPL-MCNC: 0.8 MG/DL (ref 0.6–1.2)
D DIMER: <200 NG/ML DDU (ref 0–229)
EOSINOPHILS ABSOLUTE: 0.1 K/UL (ref 0–0.6)
EOSINOPHILS RELATIVE PERCENT: 0.8 %
GFR AFRICAN AMERICAN: >60
GFR NON-AFRICAN AMERICAN: >60
GLOBULIN: 2.8 G/DL
GLUCOSE BLD-MCNC: 115 MG/DL (ref 70–99)
GLUCOSE URINE: NEGATIVE MG/DL
HCT VFR BLD CALC: 40.1 % (ref 36–48)
HEMOGLOBIN: 13.8 G/DL (ref 12–16)
KETONES, URINE: NEGATIVE MG/DL
LEUKOCYTE ESTERASE, URINE: NEGATIVE
LYMPHOCYTES ABSOLUTE: 1 K/UL (ref 1–5.1)
LYMPHOCYTES RELATIVE PERCENT: 14.3 %
MCH RBC QN AUTO: 32.3 PG (ref 26–34)
MCHC RBC AUTO-ENTMCNC: 34.4 G/DL (ref 31–36)
MCV RBC AUTO: 93.9 FL (ref 80–100)
MICROSCOPIC EXAMINATION: NORMAL
MONOCYTES ABSOLUTE: 0.4 K/UL (ref 0–1.3)
MONOCYTES RELATIVE PERCENT: 6.3 %
NEUTROPHILS ABSOLUTE: 5.3 K/UL (ref 1.7–7.7)
NEUTROPHILS RELATIVE PERCENT: 78.2 %
NITRITE, URINE: NEGATIVE
PDW BLD-RTO: 13.1 % (ref 12.4–15.4)
PH UA: 6.5 (ref 5–8)
PLATELET # BLD: 180 K/UL (ref 135–450)
PMV BLD AUTO: 8.1 FL (ref 5–10.5)
POTASSIUM REFLEX MAGNESIUM: 4.2 MMOL/L (ref 3.5–5.1)
PRO-BNP: 159 PG/ML (ref 0–124)
PROTEIN UA: NEGATIVE MG/DL
RAPID INFLUENZA  B AGN: NEGATIVE
RAPID INFLUENZA A AGN: NEGATIVE
RBC # BLD: 4.27 M/UL (ref 4–5.2)
SODIUM BLD-SCNC: 141 MMOL/L (ref 136–145)
SPECIFIC GRAVITY UA: 1.01 (ref 1–1.03)
TOTAL PROTEIN: 7.1 G/DL (ref 6.4–8.2)
TROPONIN: <0.01 NG/ML
URINE REFLEX TO CULTURE: NORMAL
URINE TYPE: NORMAL
UROBILINOGEN, URINE: 0.2 E.U./DL
WBC # BLD: 6.7 K/UL (ref 4–11)

## 2020-11-13 PROCEDURE — 2580000003 HC RX 258: Performed by: EMERGENCY MEDICINE

## 2020-11-13 PROCEDURE — 6370000000 HC RX 637 (ALT 250 FOR IP): Performed by: EMERGENCY MEDICINE

## 2020-11-13 PROCEDURE — 84484 ASSAY OF TROPONIN QUANT: CPT

## 2020-11-13 PROCEDURE — 96374 THER/PROPH/DIAG INJ IV PUSH: CPT

## 2020-11-13 PROCEDURE — 93005 ELECTROCARDIOGRAM TRACING: CPT | Performed by: EMERGENCY MEDICINE

## 2020-11-13 PROCEDURE — 99285 EMERGENCY DEPT VISIT HI MDM: CPT

## 2020-11-13 PROCEDURE — 83880 ASSAY OF NATRIURETIC PEPTIDE: CPT

## 2020-11-13 PROCEDURE — 71046 X-RAY EXAM CHEST 2 VIEWS: CPT

## 2020-11-13 PROCEDURE — 85025 COMPLETE CBC W/AUTO DIFF WBC: CPT

## 2020-11-13 PROCEDURE — 80053 COMPREHEN METABOLIC PANEL: CPT

## 2020-11-13 PROCEDURE — 87804 INFLUENZA ASSAY W/OPTIC: CPT

## 2020-11-13 PROCEDURE — 6360000002 HC RX W HCPCS: Performed by: EMERGENCY MEDICINE

## 2020-11-13 PROCEDURE — 36415 COLL VENOUS BLD VENIPUNCTURE: CPT

## 2020-11-13 PROCEDURE — 85379 FIBRIN DEGRADATION QUANT: CPT

## 2020-11-13 PROCEDURE — 81003 URINALYSIS AUTO W/O SCOPE: CPT

## 2020-11-13 RX ORDER — DOXYCYCLINE 100 MG/1
100 TABLET ORAL 2 TIMES DAILY
Qty: 20 TABLET | Refills: 0 | Status: SHIPPED | OUTPATIENT
Start: 2020-11-13 | End: 2020-11-23

## 2020-11-13 RX ORDER — ACETAMINOPHEN 500 MG
1000 TABLET ORAL ONCE
Status: COMPLETED | OUTPATIENT
Start: 2020-11-13 | End: 2020-11-13

## 2020-11-13 RX ORDER — DOXYCYCLINE HYCLATE 100 MG
100 TABLET ORAL ONCE
Status: COMPLETED | OUTPATIENT
Start: 2020-11-13 | End: 2020-11-13

## 2020-11-13 RX ORDER — BENZONATATE 100 MG/1
100-200 CAPSULE ORAL 3 TIMES DAILY PRN
Qty: 60 CAPSULE | Refills: 0 | Status: SHIPPED | OUTPATIENT
Start: 2020-11-13 | End: 2020-11-20

## 2020-11-13 RX ORDER — 0.9 % SODIUM CHLORIDE 0.9 %
1000 INTRAVENOUS SOLUTION INTRAVENOUS ONCE
Status: COMPLETED | OUTPATIENT
Start: 2020-11-13 | End: 2020-11-13

## 2020-11-13 RX ORDER — DEXAMETHASONE SODIUM PHOSPHATE 4 MG/ML
10 INJECTION, SOLUTION INTRA-ARTICULAR; INTRALESIONAL; INTRAMUSCULAR; INTRAVENOUS; SOFT TISSUE ONCE
Status: COMPLETED | OUTPATIENT
Start: 2020-11-13 | End: 2020-11-13

## 2020-11-13 RX ORDER — BENZONATATE 100 MG/1
100 CAPSULE ORAL ONCE
Status: COMPLETED | OUTPATIENT
Start: 2020-11-13 | End: 2020-11-13

## 2020-11-13 RX ADMIN — ACETAMINOPHEN 1000 MG: 500 TABLET ORAL at 17:52

## 2020-11-13 RX ADMIN — DEXAMETHASONE SODIUM PHOSPHATE 10 MG: 4 INJECTION, SOLUTION INTRA-ARTICULAR; INTRALESIONAL; INTRAMUSCULAR; INTRAVENOUS; SOFT TISSUE at 18:02

## 2020-11-13 RX ADMIN — DOXYCYCLINE HYCLATE 100 MG: 100 TABLET, COATED ORAL at 20:40

## 2020-11-13 RX ADMIN — BENZONATATE 100 MG: 100 CAPSULE ORAL at 17:52

## 2020-11-13 RX ADMIN — SODIUM CHLORIDE 1000 ML: 9 INJECTION, SOLUTION INTRAVENOUS at 18:02

## 2020-11-13 ASSESSMENT — PAIN DESCRIPTION - FREQUENCY: FREQUENCY: CONTINUOUS

## 2020-11-13 ASSESSMENT — PAIN SCALES - GENERAL
PAINLEVEL_OUTOF10: 7
PAINLEVEL_OUTOF10: 2
PAINLEVEL_OUTOF10: 2
PAINLEVEL_OUTOF10: 7

## 2020-11-13 ASSESSMENT — PAIN DESCRIPTION - LOCATION: LOCATION: RIB CAGE

## 2020-11-13 ASSESSMENT — PAIN DESCRIPTION - DESCRIPTORS: DESCRIPTORS: ACHING

## 2020-11-13 ASSESSMENT — PAIN DESCRIPTION - ORIENTATION: ORIENTATION: LEFT

## 2020-11-13 NOTE — ED TRIAGE NOTES
C/o cough, runny nose and eyes for 2 weeks. Now  C/o shortness of breath, chills, body aches also. States cough is productive with clear sputum.

## 2020-11-13 NOTE — ED PROVIDER NOTES
Physician-In-Triage Note    I performed a medical screening examination and evaluated this patient briefly with the purpose of initiating their ED workup in an expeditious manner. Please see notes from other ED providers regarding comprehensive evaluation including full history, physical exam, interpretation of results, and medical decision making/disposition. In brief, Sue West is a 72 y.o. female who presents to the ED complaining of SOB, cough, some L sided intermittent discomfort on the chest for 2-3 weeks or so. History of COPD. Focused physical examination notable for no acute distress, well-appearing, well-nourished, normal speech and mentation without obvious facial droop, no obvious rash. No obvious cranial nerve deficits on my initial exam.       Vitals reviewed per nursing documentation. Triage orders placed, including COVID test, flu, CXR, labs, EKG. Hypoxic with ambulation per RN, so 2L NC ordered. I did not personally review any of the results of these tests, which will be reviewed and interpreted later by ED providers at the time of the patient's more comprehensive evaluation.        Jennifer Hernandez MD  11/13/20 4823

## 2020-11-13 NOTE — ED NOTES
Patient walked back and forth in treatment room. Pulse 104. Respiration 28. O2 sat 87%. Patient c/o shortness of breath while walking.   O2 returned to 93% at rest.     Feroz Mauro RN  11/13/20 2092

## 2020-11-14 ENCOUNTER — CARE COORDINATION (OUTPATIENT)
Dept: CARE COORDINATION | Age: 70
End: 2020-11-14

## 2020-11-14 LAB
EKG ATRIAL RATE: 96 BPM
EKG DIAGNOSIS: NORMAL
EKG P AXIS: 57 DEGREES
EKG P-R INTERVAL: 152 MS
EKG Q-T INTERVAL: 400 MS
EKG QRS DURATION: 130 MS
EKG QTC CALCULATION (BAZETT): 505 MS
EKG R AXIS: 71 DEGREES
EKG T AXIS: 17 DEGREES
EKG VENTRICULAR RATE: 96 BPM

## 2020-11-14 PROCEDURE — 93010 ELECTROCARDIOGRAM REPORT: CPT | Performed by: INTERNAL MEDICINE

## 2020-11-14 NOTE — CARE COORDINATION
Called and spoke with personnel at [facility] ED. She states that she would look into it to see if the patient was tested and will call [facility] back. Provided her with [facility]'s call back information.
Personnel from [facility] called BRENDA back. She reviewed the chart and spoke with the lab and said that unfortunately, she was not tested for COVID. She was tested for the flu, but they no longer have the specimen. She states that the patient can return to the ED for testing.
and red flags such as increased shortness of breath, increasing fever and signs of decompensation with patient who verbalized understanding. Discussed exposure protocols and quarantine with CDC Guidelines What to do if you are sick with coronavirus disease 2019.  Patient was given an opportunity for questions and concerns. The patient agrees to contact the [facility] exposure line [phone], [facility]: ([phone]) and PCP office for questions related to their healthcare. CTN/ACM provided contact information for future needs. Reviewed and educated patient on any new and changed medications related to discharge diagnosis     Patient/family/caregiver given information for [facility] and agrees to enroll no    Plan for follow-up call in 3-5 days based on severity of symptoms and risk factors. No future appointments. Alphonso Spatz MSN, RN  Ambulatory Care Manager  [phone]  [email or web address]

## 2020-11-14 NOTE — ED NOTES
Discharge instructions reviewed with patient, patient verbalized understanding   Home medications reviewed, all questions answered  PIV removed without complications  Patient ambulatory to exit without difficulty     Skyler Mathias RN  11/13/20 204

## 2020-11-17 ENCOUNTER — CARE COORDINATION (OUTPATIENT)
Dept: CARE COORDINATION | Age: 70
End: 2020-11-17

## 2020-11-17 NOTE — CARE COORDINATION
Attempted outreach call; left a  with ACM call-back information. No future appointments. Manolo Schmid MSN, RN  Ambulatory Care Manager  [phone]  [email or web address]

## 2020-11-20 ENCOUNTER — CARE COORDINATION (OUTPATIENT)
Dept: CARE COORDINATION | Age: 70
End: 2020-11-20

## 2020-11-20 NOTE — CARE COORDINATION
You Patient resolved from the Care Transitions episode on 11/20/20  Discussed COVID-19 related testing which was not done at this time. Test results were not done. Patient informed of results, if available? N/A    Patient/family has been provided the following resources and education related to COVID-19:                         Signs, symptoms and red flags related to COVID-19            CDC exposure and quarantine guidelines            [facility] exposure contact - [phone]            Contact for their local Department of Health                 Patient currently reports that the following symptoms have improved:  final attempt; left another  with ACM call back information. No further outreach scheduled with this CTN/ACM. Episode of Care resolved. Patient has this CTN/ACM contact information if future needs arise. No future appointments. Kristopher Elias MSN, RN  Ambulatory Care Manager  [phone]  [email or web address]

## 2020-11-27 NOTE — ED PROVIDER NOTES
eMERGENCY dEPARTMENT eNCOUnter      [address]    Chief Complaint   Patient presents with    Cough     pt states that she has been sick for over a week and not getting better    Pharyngitis    Generalized Body Aches    Chills       DANIEL Aguiar is a 72 y.o. female who presents presents with cough and sore throat and chills for over a week. No sick contacts no other associated signs or symptoms. Patient has known COPD. She does not have any chest pain and she is been afebrile. She is oxygen dependent at home    PAST MEDICAL HISTORY    Past Medical History:   Diagnosis Date    COPD, mild ([address]     Depression     Hypercholesteremia     Hypertension        SURGICAL HISTORY    Past Surgical History:   Procedure Laterality Date    APPENDECTOMY       SECTION      CHOLECYSTECTOMY      HYSTERECTOMY      KIDNEY REMOVAL         CURRENT MEDICATIONS    Current Outpatient Rx   Medication Sig Dispense Refill    OXYGEN Inhale into the lungs Patient uses as needed. She doesn't know how much she uses.       albuterol sulfate  (90 Base) MCG/ACT inhaler Inhale 2 puffs into the lungs 4 times daily as needed for Wheezing 1 Inhaler 0    albuterol (PROVENTIL) (5 MG/ML) 0.5% nebulizer solution Take 0.5 mLs by nebulization every 6 hours as needed for Wheezing 120 each 0    azithromycin (ZITHROMAX) 250 MG tablet Per package instructions 1 packet 0    Cholecalciferol (VITAMIN D3) 94365 units CAPS TAKE ONE CAPSULE BY MOUTH ONCE A WEEK 4 capsule 3    Roflumilast (DALIRESP) 500 MCG tablet Take 500 mcg by mouth daily 30 tablet 2    Fluticasone Furoate-Vilanterol (BREO ELLIPTA IN) Inhale into the lungs      Umeclidinium Bromide (INCRUSE ELLIPTA IN) Inhale into the lungs      amLODIPine (NORVASC) 5 MG tablet Take 5 mg by mouth daily      Nicotine (NICODERM CQ TD) Place onto the skin      DULoxetine (CYMBALTA) 60 MG extended release capsule Take 60 mg by mouth daily      vitamin D (CHOLECALCIFEROL) 1000 UNIT TABS tablet Take 1 tablet by mouth daily 30 tablet 1    Multiple Vitamins-Minerals (CENTROVITE) TABS Take 1 tablet by mouth daily      clonazePAM (KLONOPIN) 1 MG tablet Take 1 tablet by mouth 2 times daily      Calcium Carbonate-Vitamin D 500-400 MG-UNIT TABS Take 1 tablet by mouth daily      SYMBICORT 160-4.5 MCG/ACT AERO Take 1 puff by mouth every 4 hours as needed      TUDORZA PRESSAIR 400 MCG/ACT AEPB inhaler Take 1 puff by mouth daily as needed      acetaminophen (APAP EXTRA STRENGTH) 500 MG tablet Take 2 tablets by mouth every 6 hours as needed for Pain 120 tablet 2    atorvastatin (LIPITOR) 40 MG tablet Take 40 mg by mouth daily         ALLERGIES    No Known Allergies    FAMILY HISTORY    Family History   Problem Relation Age of Onset    Cancer Mother         brain and throat    Heart Attack Father        SOCIAL HISTORY    Social History     Socioeconomic History    Marital status: Single     Spouse name: None    Number of children: None    Years of education: None    Highest education level: None   Occupational History    None   Social Needs    Financial resource strain: None    Food insecurity     Worry: None     Inability: None    Transportation needs     Medical: None     Non-medical: None   Tobacco Use    Smoking status: Current Every Day Smoker     Packs/day: 0.25     Years: 50.00     Pack years: 12.50     Types: Cigarettes    Smokeless tobacco: Current User   Substance and Sexual Activity    Alcohol use: Yes     Comment: weekly    Drug use: No    Sexual activity: None   Lifestyle    Physical activity     Days per week: None     Minutes per session: None    Stress: None   Relationships    Social connections     Talks on phone: None     Gets together: None     Attends Anglican service: None     Active member of club or organization: None     Attends meetings of clubs or organizations: None     Relationship status: None    Intimate partner violence Fear of current or ex partner: None     Emotionally abused: None     Physically abused: None     Forced sexual activity: None   Other Topics Concern    None   Social History Narrative    None       REVIEW OF SYSTEMS    Constitutional:  Denies fever, chills, weight loss or weakness   Eyes:  Denies photophobia or discharge   HENT:  Denies sore throat or ear pain   Respiratory: shortness of breath   Cardiovascular:  Denies chest pain, palpitations or swelling   GI:  Denies abdominal pain, nausea, vomiting, or diarrhea   Musculoskeletal:  Denies back pain   Skin:  Denies rash   Neurologic:  Denies headache, focal weakness or sensory changes   Endocrine:  Denies polyuria or polydypsia   Lymphatic:  Denies swollen glands   Psychiatric:  Denies depression, suicidal ideation or homicidal ideation   All systems negative except as marked. PHYSICAL EXAM    VITAL SIGNS: /73   Pulse 86   Temp 97.9 °F (36.6 °C) (Oral)   Resp 23   Ht 5' 3\" (1.6 m)   Wt 193 lb 5.5 oz (87.7 kg)   SpO2 99%   BMI 34.25 kg/m²    Constitutional:  Well developed, Well nourished, No acute distress, Non-toxic appearance. HENT:  Normocephalic, Atraumatic, Bilateral external ears normal, Oropharynx moist, No oral exudates, Nose normal. Neck- Normal range of motion, No tenderness, Supple, No stridor. Eyes:  PERRL, EOMI, Conjunctiva normal, No discharge. Respiratory:  Normal breath sounds, No respiratory distress, No wheezing, No chest tenderness. Cardiovascular:  Normal heart rate, Normal rhythm, No murmurs, No rubs, No gallops. GI:  Bowel sounds normal, Soft, No tenderness, No masses, No pulsatile masses. Musculoskeletal:  Intact distal pulses, No edema, No tenderness, No cyanosis, No clubbing. Good range of motion in all major joints. No tenderness to palpation or major deformities noted. Back- No tenderness. Integument:  Warm, Dry, No erythema, No rash. Lymphatic:  No lymphadenopathy noted.    Neurologic:  Alert & oriented x 3, Normal motor function, Normal sensory function, No focal deficits noted. Psychiatric:  Affect normal, Judgment normal, Mood normal.     EKG        RADIOLOGY    XR CHEST (2 VW)   Final Result   No convincing evidence for acute cardiopulmonary pathology. COPD.            Labs Reviewed   COMPREHENSIVE METABOLIC PANEL W/ REFLEX TO MG FOR LOW K - Abnormal; Notable for the following components:       Result Value    Glucose 115 (*)     Alkaline Phosphatase 156 (*)     All other components within normal limits    Narrative:     Performed at:  [facility]  [address] [place], [place]   Phone [phone]   BRAIN NATRIURETIC PEPTIDE - Abnormal; Notable for the following components:    Pro- (*)     All other components within normal limits    Narrative:     Performed at:  [facility]  [address] [place], [place]   Phone [phone]   RAPID INFLUENZA A/B ANTIGENS    Narrative:     Performed at:  [facility]  [address] [place], [place]   Phone [phone]   URINE RT REFLEX TO CULTURE    Narrative:     Performed at:  [address] [facility]  [address] [place], [place]   Phone [phone]   CBC WITH AUTO DIFFERENTIAL    Narrative:     Performed at:  [address] [facility]  [address] [place], [place]   Phone [phone]   TROPONIN    Narrative:     Performed at:  [address] [facility]  [address] [place], [place]   Phone [phone]   D-DIMER, QUANTITATIVE    Narrative:     Performed at:  [address] [facility]  [address] [place], [place]   Phone [phone]         PROCEDURES        ED [facility]    Pertinent Labs & Imaging studies reviewed. (See chart for details)  The patient is hemodynamically stable. Laboratories and tests are unremarkable. She has no sign of serious bacterial illness on physical examination. She will be discharged home in good condition I want her to follow-up with primary care. She is return for increased shortness of breath. She has known COPD as well. Patient is stable at discharge    FINAL IMPRESSION    1.  Acute upper respiratory infection             Anjel Smith MD  11/26/20 2002

## 2021-03-13 ENCOUNTER — HOSPITAL ENCOUNTER (EMERGENCY)
Age: 71
Discharge: HOME | End: 2021-03-13
Attending: EMERGENCY MEDICINE
Payer: MEDICAID

## 2021-03-13 ENCOUNTER — APPOINTMENT (OUTPATIENT)
Dept: GENERAL RADIOLOGY | Age: 71
End: 2021-03-13
Payer: MEDICAID

## 2021-03-13 VITALS
WEIGHT: 75.51 LBS | SYSTOLIC BLOOD PRESSURE: 137 MMHG | RESPIRATION RATE: 19 BRPM | BODY MASS INDEX: 13.38 KG/M2 | OXYGEN SATURATION: 94 % | HEIGHT: 63 IN | TEMPERATURE: 98.2 F | DIASTOLIC BLOOD PRESSURE: 67 MMHG | HEART RATE: 84 BPM

## 2021-03-13 DIAGNOSIS — R79.89 HIGH SERUM LACTIC ACID: ICD-10-CM

## 2021-03-13 DIAGNOSIS — F41.1 ANXIETY STATE: ICD-10-CM

## 2021-03-13 DIAGNOSIS — J44.1 COPD EXACERBATION (HCC): Primary | ICD-10-CM

## 2021-03-13 LAB
A/G RATIO: 1.4 (ref 1.1–2.2)
ALBUMIN SERPL-MCNC: 4.2 G/DL (ref 3.4–5)
ALP BLD-CCNC: 134 U/L (ref 40–129)
ALT SERPL-CCNC: 17 U/L (ref 10–40)
ANION GAP SERPL CALCULATED.3IONS-SCNC: 14 MMOL/L (ref 3–16)
AST SERPL-CCNC: 21 U/L (ref 15–37)
BASE EXCESS VENOUS: -0.1 MMOL/L (ref -3–3)
BASOPHILS ABSOLUTE: 0 K/UL (ref 0–0.2)
BASOPHILS RELATIVE PERCENT: 0.5 %
BILIRUB SERPL-MCNC: 0.3 MG/DL (ref 0–1)
BUN BLDV-MCNC: 15 MG/DL (ref 7–20)
CALCIUM SERPL-MCNC: 9.6 MG/DL (ref 8.3–10.6)
CHLORIDE BLD-SCNC: 101 MMOL/L (ref 99–110)
CO2: 25 MMOL/L (ref 21–32)
CREAT SERPL-MCNC: 0.8 MG/DL (ref 0.6–1.2)
EOSINOPHILS ABSOLUTE: 0.1 K/UL (ref 0–0.6)
EOSINOPHILS RELATIVE PERCENT: 1.4 %
GFR AFRICAN AMERICAN: >60
GFR NON-AFRICAN AMERICAN: >60
GLOBULIN: 2.9 G/DL
GLUCOSE BLD-MCNC: 99 MG/DL (ref 70–99)
HCO3 VENOUS: 25.2 MMOL/L (ref 23–29)
HCT VFR BLD CALC: 41.2 % (ref 36–48)
HEMOGLOBIN: 14 G/DL (ref 12–16)
LACTIC ACID: 2.4 MMOL/L (ref 0.4–2)
LYMPHOCYTES ABSOLUTE: 0.8 K/UL (ref 1–5.1)
LYMPHOCYTES RELATIVE PERCENT: 16.5 %
MCH RBC QN AUTO: 32 PG (ref 26–34)
MCHC RBC AUTO-ENTMCNC: 34 G/DL (ref 31–36)
MCV RBC AUTO: 94.2 FL (ref 80–100)
MONOCYTES ABSOLUTE: 0.3 K/UL (ref 0–1.3)
MONOCYTES RELATIVE PERCENT: 6.3 %
NEUTROPHILS ABSOLUTE: 3.9 K/UL (ref 1.7–7.7)
NEUTROPHILS RELATIVE PERCENT: 75.3 %
O2 SAT, VEN: 92 %
O2 THERAPY: ABNORMAL
PCO2, VEN: 43.9 MMHG (ref 40–50)
PDW BLD-RTO: 13.6 % (ref 12.4–15.4)
PH VENOUS: 7.37 (ref 7.35–7.45)
PLATELET # BLD: 203 K/UL (ref 135–450)
PMV BLD AUTO: 8 FL (ref 5–10.5)
PO2, VEN: 66.2 MMHG (ref 25–40)
POTASSIUM REFLEX MAGNESIUM: 4.2 MMOL/L (ref 3.5–5.1)
PRO-BNP: 277 PG/ML (ref 0–124)
RBC # BLD: 4.38 M/UL (ref 4–5.2)
SODIUM BLD-SCNC: 140 MMOL/L (ref 136–145)
TCO2 CALC VENOUS: 27 MMOL/L
TOTAL PROTEIN: 7.1 G/DL (ref 6.4–8.2)
TROPONIN: <0.01 NG/ML
WBC # BLD: 5.1 K/UL (ref 4–11)

## 2021-03-13 PROCEDURE — 99285 EMERGENCY DEPT VISIT HI MDM: CPT

## 2021-03-13 PROCEDURE — 6370000000 HC RX 637 (ALT 250 FOR IP): Performed by: EMERGENCY MEDICINE

## 2021-03-13 PROCEDURE — 82803 BLOOD GASES ANY COMBINATION: CPT

## 2021-03-13 PROCEDURE — 71046 X-RAY EXAM CHEST 2 VIEWS: CPT

## 2021-03-13 PROCEDURE — 83880 ASSAY OF NATRIURETIC PEPTIDE: CPT

## 2021-03-13 PROCEDURE — 96374 THER/PROPH/DIAG INJ IV PUSH: CPT

## 2021-03-13 PROCEDURE — 6360000002 HC RX W HCPCS: Performed by: EMERGENCY MEDICINE

## 2021-03-13 PROCEDURE — 85025 COMPLETE CBC W/AUTO DIFF WBC: CPT

## 2021-03-13 PROCEDURE — 80053 COMPREHEN METABOLIC PANEL: CPT

## 2021-03-13 PROCEDURE — 2580000003 HC RX 258: Performed by: EMERGENCY MEDICINE

## 2021-03-13 PROCEDURE — 36415 COLL VENOUS BLD VENIPUNCTURE: CPT

## 2021-03-13 PROCEDURE — 83605 ASSAY OF LACTIC ACID: CPT

## 2021-03-13 PROCEDURE — 84484 ASSAY OF TROPONIN QUANT: CPT

## 2021-03-13 RX ORDER — ALPRAZOLAM 0.25 MG/1
0.25 TABLET ORAL NIGHTLY PRN
Qty: 5 TABLET | Refills: 0 | Status: SHIPPED | OUTPATIENT
Start: 2021-03-13 | End: 2021-03-15

## 2021-03-13 RX ORDER — METHYLPREDNISOLONE SODIUM SUCCINATE 125 MG/2ML
125 INJECTION, POWDER, LYOPHILIZED, FOR SOLUTION INTRAMUSCULAR; INTRAVENOUS ONCE
Status: COMPLETED | OUTPATIENT
Start: 2021-03-13 | End: 2021-03-13

## 2021-03-13 RX ORDER — CLOTRIMAZOLE 1 %
CREAM (GRAM) TOPICAL
Qty: 1 TUBE | Refills: 1 | Status: SHIPPED | OUTPATIENT
Start: 2021-03-13 | End: 2021-03-20

## 2021-03-13 RX ORDER — GUAIFENESIN 600 MG/1
600 TABLET, EXTENDED RELEASE ORAL 2 TIMES DAILY
Qty: 14 TABLET | Refills: 0 | Status: SHIPPED | OUTPATIENT
Start: 2021-03-13 | End: 2021-03-20

## 2021-03-13 RX ORDER — METHYLPREDNISOLONE 4 MG/1
TABLET ORAL
Qty: 1 KIT | Refills: 0 | Status: SHIPPED | OUTPATIENT
Start: 2021-03-13

## 2021-03-13 RX ORDER — AZITHROMYCIN 500 MG/1
500 TABLET, FILM COATED ORAL DAILY
Qty: 3 TABLET | Refills: 0 | Status: SHIPPED | OUTPATIENT
Start: 2021-03-13 | End: 2021-03-16

## 2021-03-13 RX ORDER — IPRATROPIUM BROMIDE AND ALBUTEROL SULFATE 2.5; .5 MG/3ML; MG/3ML
1 SOLUTION RESPIRATORY (INHALATION) ONCE
Status: COMPLETED | OUTPATIENT
Start: 2021-03-13 | End: 2021-03-13

## 2021-03-13 RX ORDER — 0.9 % SODIUM CHLORIDE 0.9 %
1000 INTRAVENOUS SOLUTION INTRAVENOUS ONCE
Status: COMPLETED | OUTPATIENT
Start: 2021-03-13 | End: 2021-03-13

## 2021-03-13 RX ORDER — ALPRAZOLAM 0.25 MG/1
0.5 TABLET ORAL ONCE
Status: COMPLETED | OUTPATIENT
Start: 2021-03-13 | End: 2021-03-13

## 2021-03-13 RX ADMIN — SODIUM CHLORIDE 1000 ML: 9 INJECTION, SOLUTION INTRAVENOUS at 18:45

## 2021-03-13 RX ADMIN — METHYLPREDNISOLONE SODIUM SUCCINATE 125 MG: 125 INJECTION, POWDER, FOR SOLUTION INTRAMUSCULAR; INTRAVENOUS at 18:50

## 2021-03-13 RX ADMIN — IPRATROPIUM BROMIDE AND ALBUTEROL SULFATE 1 AMPULE: .5; 3 SOLUTION RESPIRATORY (INHALATION) at 18:18

## 2021-03-13 RX ADMIN — ALPRAZOLAM 0.5 MG: 0.25 TABLET ORAL at 18:16

## 2021-03-13 NOTE — ED PROVIDER NOTES
eMERGENCY dEPARTMENT eNCOUnter      Pt Name: Allen Craig  MRN: [id number]  [place] 1950  Date of evaluation: 3/13/2021  Provider: Kennedy Carmichael MD     [address] [place] [address]       Chief Complaint   Patient presents with    Cough     reports having a cough for a couple weeks and feels sob w exertion and c/o weakness          HISTORY OF PRESENT ILLNESS   (Location/Symptom, Timing/Onset,Context/Setting, Quality, Duration, Modifying Factors, Severity) Note limiting factors. HPI    Allen Craig is a 80 y.o. female who presents to the emergency department with a nonproductive cough for several weeks and now feels more short of breath with exertion and severe weakness. She denies a fever. Patient has history of COPD. Patient states in the past she went into a respiratory failure and had to be intubated and was on a ventilator for several weeks. Patient denies any exposure to Covid. She has no chest pain. She has had pneumonia in the past.  She states she does smoke but has not smoked in 4 weeks. Nursing Notes were reviewed. REVIEW OFSYSTEMS    (2+ for level 4; 10+ for level 5)   Review of Systems    General: No fevers, chills or night sweats, No weight loss    Head:  No Sore throat,  No Ear Pain    Chest:  Nontender. Positive cough, positive SOB, no chest Pain    GI: No abdominal pain or vomiting    : No dysuria or hematuria    Musculoskeletal: No unrelenting pain or night pain    Neurologic: No bowel or bladder incontinence, No saddle anesthesia, No leg weakness    All other systems reviewed and are negative.         PAST MEDICAL HISTORY     Past Medical History:   Diagnosis Date    COPD, mild ([address]     Depression     Hypercholesteremia     Hypertension        SURGICAL HISTORY       Past Surgical History:   Procedure Laterality Date    APPENDECTOMY       SECTION      CHOLECYSTECTOMY      HYSTERECTOMY      KIDNEY REMOVAL         CURRENT MEDICATIONS       Discharge Medication List as of 3/13/2021  7:42 PM      CONTINUE these medications which have NOT CHANGED    Details   OXYGEN Inhale into the lungs Patient uses as needed. She doesn't know how much she uses. Historical Med      albuterol sulfate  (90 Base) MCG/ACT inhaler Inhale 2 puffs into the lungs 4 times daily as needed for Wheezing, Disp-1 Inhaler,R-0Print      amLODIPine (NORVASC) 5 MG tablet Take 5 mg by mouth dailyHistorical Med      vitamin D (CHOLECALCIFEROL) 1000 UNIT TABS tablet Take 1 tablet by mouth daily, Disp-30 tablet, R-1Print      acetaminophen (APAP EXTRA STRENGTH) 500 MG tablet Take 2 tablets by mouth every 6 hours as needed for Pain, Disp-120 tablet, R-2      atorvastatin (LIPITOR) 40 MG tablet Take 40 mg by mouth daily      albuterol (PROVENTIL) (5 MG/ML) 0.5% nebulizer solution Take 0.5 mLs by nebulization every 6 hours as needed for Wheezing, Disp-120 each,R-0Print      Cholecalciferol (VITAMIN D3) 88036 units CAPS TAKE ONE CAPSULE BY MOUTH ONCE A WEEK, Disp-4 capsule, R-3Normal      Roflumilast (DALIRESP) 500 MCG tablet Take 500 mcg by mouth daily, Disp-30 tablet, R-2Normal      Fluticasone Furoate-Vilanterol (BREO ELLIPTA IN) Inhale into the lungsHistorical Med      Umeclidinium Bromide (INCRUSE ELLIPTA IN) Inhale into the lungsHistorical Med      Nicotine (NICODERM CQ TD) Place onto the skinHistorical Med      DULoxetine (CYMBALTA) 60 MG extended release capsule Take 60 mg by mouth dailyHistorical Med      Multiple Vitamins-Minerals (CENTROVITE) TABS Take 1 tablet by mouth dailyHistorical Med      clonazePAM (KLONOPIN) 1 MG tablet Take 1 tablet by mouth 2 times dailyHistorical Med      Calcium Carbonate-Vitamin D 500-400 MG-UNIT TABS Take 1 tablet by mouth dailyHistorical Med      SYMBICORT 160-4.5 MCG/ACT AERO Take 1 puff by mouth every 4 hours as needed, DAWHistorical Med      TUDORZA PRESSAIR 400 MCG/ACT AEPB inhaler Take 1 puff by mouth daily as needed, DAWHistorical Med             ALLERGIES Patient has no known allergies. FAMILY HISTORY       Family History   Problem Relation Age of Onset    Cancer Mother         brain and throat    Heart Attack Father         SOCIAL HISTORY       Social History     Socioeconomic History    Marital status: Single     Spouse name: None    Number of children: None    Years of education: None    Highest education level: None   Occupational History    None   Social Needs    Financial resource strain: None    Food insecurity     Worry: None     Inability: None    Transportation needs     Medical: None     Non-medical: None   Tobacco Use    Smoking status: Current Every Day Smoker     Packs/day: 0.25     Years: 50.00     Pack years: 12.50     Types: Cigarettes    Smokeless tobacco: Never Used   Substance and Sexual Activity    Alcohol use: Not Currently     Comment: weekly    Drug use: No    Sexual activity: Not Currently   Lifestyle    Physical activity     Days per week: None     Minutes per session: None    Stress: None   Relationships    Social connections     Talks on phone: None     Gets together: None     Attends Voodoo service: None     Active member of club or organization: None     Attends meetings of clubs or organizations: None     Relationship status: None    Intimate partner violence     Fear of current or ex partner: None     Emotionally abused: None     Physically abused: None     Forced sexual activity: None   Other Topics Concern    None   Social History Narrative    None       SCREENINGS    Driscoll Coma Scale  Eye Opening: Spontaneous  Best Verbal Response: Oriented  Best Motor Response: Obeys commands  Driscoll Coma Scale Score: 15      PHYSICAL EXAM    (up to 7 for level 4, 8 or more for level 5)     ED Triage Vitals [03/13/21 1748]   BP Temp Temp Source Pulse Resp SpO2 Height Weight   (!) 142/86 98.2 °F (36.8 °C) Oral 95 24 90 % 5' 3\" (1.6 m) 75 lb 8.1 oz (34.2 kg)       Physical Exam    General: Alert and awake ×3.   Nontoxic appearance. Well-developed well-nourished pleasant 79-year-old female in mild respiratory distress. Patient appears anxious. HEENT: Normocephalic atraumatic. Neck is supple. Airway intact. No adenopathy  Cardiac: Regular rate and rhythm with no murmurs rubs or gallops  Pulmonary: Lungs are clear in all lung fields. No wheezing. No Rales. Ever, patient does feel short of breath and desats with movement or exertion. Abdomen: Soft and nontender. Negative hepatosplenomegaly. Bowel sounds are active  Extremities: Moving all extremities. No calf tenderness. Peripheral pulses all intact  Skin: No skin lesions. No rashes  Neurologic: Cranial nerves II through XII was grossly intact. Nonfocal neurological exam  Psychiatric: Patient is pleasant. Mood is appropriate. DIAGNOSTIC RESULTS     EKG (Per Emergency Physician):       RADIOLOGY (Per Emergency Physician): Interpretation per the Radiologist below, if available at the time of this note:  No results found.     ED BEDSIDE ULTRASOUND:   Performed by ED Physician - none    LABS:  Labs Reviewed   CBC WITH AUTO DIFFERENTIAL - Abnormal; Notable for the following components:       Result Value    Lymphocytes Absolute 0.8 (*)     All other components within normal limits    Narrative:     Performed at:  [facility]  [address] [facility]   Phone [phone]   COMPREHENSIVE METABOLIC PANEL W/ REFLEX TO MG FOR LOW K - Abnormal; Notable for the following components:    Alkaline Phosphatase 134 (*)     All other components within normal limits    Narrative:     Performed at:  [facility]  [address] [facility]   Phone [phone]   BRAIN NATRIURETIC PEPTIDE - Abnormal; Notable for the following components:    Pro- (*)     All other components within normal limits    Narrative:     Performed at:  [address] [place] [address] [facility]  [address] [place], [place] [facility]   Phone [phone]   BLOOD GAS, VENOUS - Abnormal; Notable for the following components:    pO2, Giorgio 66.2 (*)     All other components within normal limits    Narrative:     Performed at:  [facility]  [address] [place], [place] [facility]   Phone [phone]   LACTIC ACID, PLASMA - Abnormal; Notable for the following components:    Lactic Acid 2.4 (*)     All other components within normal limits    Narrative:     Performed at:  [facility]  [address] [place], [place] [facility]   Phone [phone]   TROPONIN    Narrative:     Performed at:  [address] [facility] [address] [facility]  [address] [place], [place] [facility]   Phone [phone]        All other labs were within normal range or not returned as of this dictation. Procedures      EMERGENCY DEPARTMENT COURSE and DIFFERENTIAL DIAGNOSIS/MDM:   Vitals:    Vitals:    03/13/21 1900 03/13/21 1918 03/13/21 1930 03/13/21 1942   BP: (!) 152/70  137/67    Pulse: 87 87 85 84   Resp: 18 27 17 19   Temp:       TempSrc:       SpO2: 95% 94% 95% 94%   Weight:       Height:           Medications   ipratropium-albuterol (DUONEB) nebulizer solution 1 ampule (1 ampule Inhalation Given 3/13/21 1818)   ALPRAZolam (XANAX) tablet 0.5 mg (0.5 mg Oral Given 3/13/21 1816)   0.9 % sodium chloride bolus (0 mLs Intravenous Stopped 3/13/21 1942)   methylPREDNISolone sodium (SOLU-MEDROL) injection 125 mg (125 mg Intravenous Given 3/13/21 1850)       MDM. Patient is a 67-year-old female with history of COPD on inhalers at home with couple weeks history of a cough and shortness of breath. Patient states worsening with exertion. There is no chest pain. On arrival patient's pulse ox in the low 90s. Patient had some shortness of breath with movement. Appears very anxious.   Patient was given a work-up. Will check chest x-ray laboratory evaluation differential at this time is pneumonia Covid infection, pulmonary edema, dehydration, anxiety or panic attack, lower differential would be a pulmonary embolism. Patient will be treated for nebulizer treatment and will be given the Xanax p.o. here. Patient will be turned over to the night physician for disposition. Patient may require admission if any of the test comes back positive. REVAL:         CRITICAL CARE TIME   Total CriticalCare time was 0 minutes, excluding separately reportable procedures. There was a high probability of clinically significant/life threatening deterioration in the patient's condition which required my urgent intervention. CONSULTS:  None    PROCEDURES:  Unless otherwise noted below, none     [unfilled]    FINAL IMPRESSION      1. COPD exacerbation ([address]    2. Anxiety state    3. High serum lactic acid          DISPOSITION/PLAN   DISPOSITION        PATIENT REFERRED TO:  MD Reji Reid [address]  [phone]    Schedule an appointment as soon as possible for a visit in 1 week  If symptoms worsen      DISCHARGE MEDICATIONS:  Discharge Medication List as of 3/13/2021  7:42 PM      START taking these medications    Details   ALPRAZolam (XANAX) 0.25 MG tablet Take 1 tablet by mouth nightly as needed for Sleep for up to 2 days. , Disp-5 tablet, R-0Print      methylPREDNISolone (MEDROL, TREVOR,) 4 MG tablet Take by mouth., Disp-1 kit, R-0Print      azithromycin (ZITHROMAX) 500 MG tablet Take 1 tablet by mouth daily for 3 days, Disp-3 tablet, R-0Print      guaiFENesin (MUCINEX) 600 MG extended release tablet Take 1 tablet by mouth 2 times daily for 7 days, Disp-14 tablet, R-0Print                (Please note:  Portions of this note were completed with a voice recognition program.Efforts were made to edit the dictations but occasionally words and phrases are mis-transcribed.)  Form

## 2021-03-14 NOTE — ED NOTES
Reviewed discharge instructions and f/u plan of care w pt in great detail w rx.      Kristian Baugh RN  03/13/21 1952

## 2021-03-14 NOTE — ED PROVIDER NOTES
Emergency Department Encounter  Location: [address] [place] [address]    Patient: Sacha Uriarte  MRN: [id number]  : 1950  Date of evaluation: 3/13/2021  ED Provider: Yanci Childers MD      Sacha Uriarte was checked out to me by Dr. Nina Benjamin. Please see his/her initial documentation for details of the patient's initial ED presentation, physical exam and completed studies. In brief, Sacha Uriarte is a 80 y.o. female that presented to the emergency department evaluation of shortness of breath and cough, in the setting of history of COPD.     I have reviewed and interpreted all of the currently available lab results and diagnostics from this visit:  Results for orders placed or performed during the hospital encounter of 21   CBC Auto Differential   Result Value Ref Range    WBC 5.1 4.0 - 11.0 K/uL    RBC 4.38 4.00 - 5.20 M/uL    Hemoglobin 14.0 12.0 - 16.0 g/dL    Hematocrit 41.2 36.0 - 48.0 %    MCV 94.2 80.0 - 100.0 fL    MCH 32.0 26.0 - 34.0 pg    MCHC 34.0 31.0 - 36.0 g/dL    RDW 13.6 12.4 - 15.4 %    Platelets 338 335 - 437 K/uL    MPV 8.0 5.0 - 10.5 fL    Neutrophils % 75.3 %    Lymphocytes % 16.5 %    Monocytes % 6.3 %    Eosinophils % 1.4 %    Basophils % 0.5 %    Neutrophils Absolute 3.9 1.7 - 7.7 K/uL    Lymphocytes Absolute 0.8 (L) 1.0 - 5.1 K/uL    Monocytes Absolute 0.3 0.0 - 1.3 K/uL    Eosinophils Absolute 0.1 0.0 - 0.6 K/uL    Basophils Absolute 0.0 0.0 - 0.2 K/uL   Comprehensive Metabolic Panel w/ Reflex to MG   Result Value Ref Range    Sodium 140 136 - 145 mmol/L    Potassium reflex Magnesium 4.2 3.5 - 5.1 mmol/L    Chloride 101 99 - 110 mmol/L    CO2 25 21 - 32 mmol/L    Anion Gap 14 3 - 16    Glucose 99 70 - 99 mg/dL    BUN 15 7 - 20 mg/dL    CREATININE 0.8 0.6 - 1.2 mg/dL    GFR Non-African American >60 >60    GFR African American >60 >60    Calcium 9.6 8.3 - 10.6 mg/dL    Total Protein 7.1 6.4 - 8.2 g/dL    Albumin 4.2 3.4 - 5.0 g/dL    Albumin/Globulin Ratio 1.4 1.1 - 2.2    Total Bilirubin 0.3 0.0 - 1.0 mg/dL    Alkaline Phosphatase 134 (H) 40 - 129 U/L    ALT 17 10 - 40 U/L    AST 21 15 - 37 U/L    Globulin 2.9 g/dL   Troponin   Result Value Ref Range    Troponin <0.01 <0.01 ng/mL   Brain Natriuretic Peptide   Result Value Ref Range    Pro- (H) 0 - 124 pg/mL   Blood Gas, Venous   Result Value Ref Range    pH, Giorgio 7.367 7.350 - 7.450    pCO2, Giorgio 43.9 40.0 - 50.0 mmHg    pO2, Giorgio 66.2 (H) 25 - 40 mmHg    HCO3, Venous 25.2 23.0 - 29.0 mmol/L    Base Excess, Giorgio -0.1 -3.0 - 3.0 mmol/L    O2 Sat, Giorgio 92 Not Established %    TC02 (Calc), Giorgio 27 Not Established mmol/L    O2 Therapy Unknown    Lactic Acid, Plasma   Result Value Ref Range    Lactic Acid 2.4 (H) 0.4 - 2.0 mmol/L     Xr Chest (2 Vw)    Result Date: 3/13/2021  EXAMINATION: TWO XRAY VIEWS OF THE CHEST 3/13/2021 6:05 pm COMPARISON: Chest radiograph 11/13/2020 and CT chest 08/06/2020 HISTORY: ORDERING SYSTEM PROVIDED HISTORY: SOB TECHNOLOGIST PROVIDED HISTORY: Reason for exam:-> SOB Reason for Exam: Cough (reports having a cough for a couple weeks and feels sob w exertion and c/o weakness ) Acuity: Acute Type of Exam: Initial FINDINGS: The cardiomediastinal and hilar silhouettes appear unremarkable. Chronic appearing coarse interstitial densities predominate perihilar regions and lung bases, typical of sequela from smoking or other previous infectious/inflammatory process. The lungs are hyperinflated with increased translucency both lung apices and tapering of the vasculature in the upper lungs. Stable densely calcified 15 mm benign nodule right upper lobe, likely sequela from old granulomatous disease. The lungs appear otherwise clear. No pleural effusion evident. No pneumothorax is seen. No acute osseous abnormality is identified. Stable approximately 50% anterior wedge compression fracture superior endplate Y25 and 66% compression inferior endplate T8.      No radiographic evidence of acute cardiopulmonary disease. Pulmonary sequela typical of that seen with smoking, including COPD. Final ED Course and MDM: In brief, Latoya Rocha is a 80 y.o. female whose care was signed out to me by the outgoing provider. In brief, this is a 80-year-old female who presented with nonproductive cough for several weeks, as well as shortness of breath of the past few days. Denies chest pain at any time. Patient was treated with a DuoNeb, as well as Solu-Medrol, and IV fluids prior to the time of signout. On my assessment, she does report some symptomatic improvement. On my assessment, she is afebrile, well-appearing, with reassuring vital signs. Patient states that she does wear supplemental oxygen in the evenings, and is saturating well on 1 L nasal cannula here. She is requesting discharge at the time of my assessment. Laboratory studies are reviewed. CBC, CMP are within normal limits. Troponin is negative. EKG interpreted by Dr. Ying García, please refer to his documentation. BNP minimally elevated at 277, VBG is reassuring. Lactate was mildly elevated at 2.4, but treated with IV fluids. Chest x-ray shows no acute cardiopulmonary disease. Presentation seems consistent with a COPD exacerbation, given otherwise reassuring work-up, I do feel the patient can be discharged home with close outpatient follow-up. Dr. Ying García had prescribed a Medrol Dosepak for COPD exacerbation, as well as Xanax for anxiety. Given productive cough, and underlying COPD, I will also prescribe a Z-Bal, for COPD related bronchitis, and Mucinex for symptomatic management. Recommended close primary care follow-up and return to the emergency department she does develop any type of chest pain, worsening shortness of breath despite these therapies, or other new or concerning symptoms. Patient is understanding and agreeable plan of care, stable at the time of discharge.     ED Medication Orders (From admission, onward)    Start Ordered     Status

## 2021-03-15 ENCOUNTER — CARE COORDINATION (OUTPATIENT)
Dept: CARE COORDINATION | Age: 71
End: 2021-03-15

## 2021-03-15 NOTE — CARE COORDINATION
Patient contacted regarding recent discharge and COVID-19 risk. Discussed COVID-19 related testing which was not done at this time. Test results were not done. Patient informed of results, if available? N/A     Care Transition Nurse/ Ambulatory Care Manager contacted the patient by telephone to perform post discharge assessment. Verified name and  with patient as identifiers. Patient has following risk factors of: COPD. CTN/ACM reviewed discharge instructions, medical action plan and red flags related to discharge diagnosis. Reviewed and educated them on any new and changed medications related to discharge diagnosis. Advised obtaining a 90-day supply of all daily and as-needed medications. Education provided regarding infection prevention, and signs and symptoms of COVID-19 and when to seek medical attention with patient who verbalized understanding. Discussed exposure protocols and quarantine from [address] you at higher risk for severe illness  and given an opportunity for questions and concerns. The patient agrees to contact the COVID-19 hotline [phone] or PCP office for questions related to their healthcare. CTN/ACM provided contact information for future reference. From CDC: Are you at higher risk for severe illness?  Wash your hands often.  Avoid close contact (6 feet, which is about two arm lengths) with people who are sick.  Put distance between yourself and other people if COVID-19 is spreading in your community.  Clean and disinfect frequently touched surfaces.  Avoid all cruise travel and non-essential air travel.  Call your healthcare professional if you have concerns about COVID-19 and your underlying condition or if you are sick. For more information on steps you can take to protect yourself, see CDC's How to [address] [place] [address] for follow-up call in 5-7 days based on severity of symptoms and risk factors.

## 2021-09-07 ENCOUNTER — HOSPITAL ENCOUNTER (OUTPATIENT)
Dept: WOMENS IMAGING | Age: 71
Discharge: HOME | End: 2021-09-07
Payer: MEDICAID

## 2021-09-07 DIAGNOSIS — S22.000D OPEN WEDGE FRACTURE OF THORACIC VERTEBRA WITH ROUTINE HEALING, UNSPECIFIED THORACIC VERTEBRAL LEVEL, SUBSEQUENT ENCOUNTER: ICD-10-CM

## 2021-09-07 PROCEDURE — 77080 DXA BONE DENSITY AXIAL: CPT

## 2021-10-12 ENCOUNTER — HOSPITAL ENCOUNTER (OUTPATIENT)
Dept: NON INVASIVE DIAGNOSTICS | Age: 71
Discharge: HOME | End: 2021-10-12
Payer: MEDICAID

## 2021-10-12 DIAGNOSIS — R01.1 CARDIAC MURMUR, UNSPECIFIED: ICD-10-CM

## 2021-10-12 LAB
LV EF: 63 %
LVEF MODALITY: NORMAL

## 2021-10-12 PROCEDURE — 93306 TTE W/DOPPLER COMPLETE: CPT

## 2022-08-05 ENCOUNTER — APPOINTMENT (OUTPATIENT)
Dept: GENERAL RADIOLOGY | Age: 72
End: 2022-08-05
Payer: MEDICAID

## 2022-08-05 ENCOUNTER — HOSPITAL ENCOUNTER (EMERGENCY)
Age: 72
Discharge: HOME | End: 2022-08-05
Attending: EMERGENCY MEDICINE
Payer: MEDICAID

## 2022-08-05 VITALS
HEART RATE: 87 BPM | DIASTOLIC BLOOD PRESSURE: 69 MMHG | TEMPERATURE: 97.8 F | RESPIRATION RATE: 18 BRPM | WEIGHT: 187.39 LBS | OXYGEN SATURATION: 94 % | BODY MASS INDEX: 35.38 KG/M2 | SYSTOLIC BLOOD PRESSURE: 110 MMHG | HEIGHT: 61 IN

## 2022-08-05 DIAGNOSIS — M54.50 ACUTE LEFT-SIDED LOW BACK PAIN WITHOUT SCIATICA: Primary | ICD-10-CM

## 2022-08-05 LAB
BILIRUBIN URINE: NEGATIVE
BLOOD, URINE: NEGATIVE
CLARITY: CLEAR
COLOR: YELLOW
GLUCOSE URINE: NEGATIVE MG/DL
KETONES, URINE: NEGATIVE MG/DL
LEUKOCYTE ESTERASE, URINE: NEGATIVE
MICROSCOPIC EXAMINATION: NORMAL
NITRITE, URINE: NEGATIVE
PH UA: 6 (ref 5–8)
PROTEIN UA: NEGATIVE MG/DL
SPECIFIC GRAVITY UA: <=1.005 (ref 1–1.03)
URINE REFLEX TO CULTURE: NORMAL
URINE TYPE: NORMAL
UROBILINOGEN, URINE: 0.2 E.U./DL

## 2022-08-05 PROCEDURE — 6360000002 HC RX W HCPCS: Performed by: EMERGENCY MEDICINE

## 2022-08-05 PROCEDURE — 81003 URINALYSIS AUTO W/O SCOPE: CPT

## 2022-08-05 PROCEDURE — 6370000000 HC RX 637 (ALT 250 FOR IP): Performed by: EMERGENCY MEDICINE

## 2022-08-05 PROCEDURE — 99284 EMERGENCY DEPT VISIT MOD MDM: CPT

## 2022-08-05 PROCEDURE — 71100 X-RAY EXAM RIBS UNI 2 VIEWS: CPT

## 2022-08-05 PROCEDURE — 96372 THER/PROPH/DIAG INJ SC/IM: CPT

## 2022-08-05 RX ORDER — KETOROLAC TROMETHAMINE 30 MG/ML
30 INJECTION, SOLUTION INTRAMUSCULAR; INTRAVENOUS ONCE
Status: COMPLETED | OUTPATIENT
Start: 2022-08-05 | End: 2022-08-05

## 2022-08-05 RX ORDER — ORPHENADRINE CITRATE 30 MG/ML
60 INJECTION INTRAMUSCULAR; INTRAVENOUS ONCE
Status: COMPLETED | OUTPATIENT
Start: 2022-08-05 | End: 2022-08-05

## 2022-08-05 RX ORDER — ONDANSETRON 4 MG/1
4 TABLET, ORALLY DISINTEGRATING ORAL ONCE
Status: COMPLETED | OUTPATIENT
Start: 2022-08-05 | End: 2022-08-05

## 2022-08-05 RX ADMIN — KETOROLAC TROMETHAMINE 30 MG: 30 INJECTION, SOLUTION INTRAMUSCULAR at 08:23

## 2022-08-05 RX ADMIN — ORPHENADRINE CITRATE 60 MG: 30 INJECTION INTRAMUSCULAR; INTRAVENOUS at 08:23

## 2022-08-05 RX ADMIN — ONDANSETRON 4 MG: 4 TABLET, ORALLY DISINTEGRATING ORAL at 07:46

## 2022-08-05 ASSESSMENT — PAIN - FUNCTIONAL ASSESSMENT: PAIN_FUNCTIONAL_ASSESSMENT: 0-10

## 2022-08-05 ASSESSMENT — PAIN SCALES - GENERAL
PAINLEVEL_OUTOF10: 8

## 2022-08-05 ASSESSMENT — ENCOUNTER SYMPTOMS
VOICE CHANGE: 0
VOMITING: 0
BACK PAIN: 1
FACIAL SWELLING: 0
ABDOMINAL PAIN: 0
SHORTNESS OF BREATH: 0
NAUSEA: 0
COLOR CHANGE: 0
STRIDOR: 0
TROUBLE SWALLOWING: 0
WHEEZING: 0

## 2022-08-05 ASSESSMENT — PAIN DESCRIPTION - PAIN TYPE: TYPE: ACUTE PAIN

## 2022-08-05 ASSESSMENT — PAIN DESCRIPTION - LOCATION
LOCATION: BACK
LOCATION: BACK

## 2022-08-05 ASSESSMENT — PAIN DESCRIPTION - ONSET: ONSET: SUDDEN

## 2022-08-05 ASSESSMENT — PAIN DESCRIPTION - ORIENTATION: ORIENTATION: LOWER

## 2022-08-05 ASSESSMENT — PAIN DESCRIPTION - DESCRIPTORS: DESCRIPTORS: ACHING

## 2022-08-05 NOTE — ED PROVIDER NOTES
[address] [facility]  eMERGENCY dEPARTMENT eNCOUnter      Pt Name: Zenia Carpio  MRN: [id number]  Carliegfbhargav 1950  Date of evaluation: 8/5/2022  Provider: Pierce Shepherd MD    [address]       Chief Complaint   Patient presents with    Back Pain     C/o lower back pain past 5 days  started after bending over to  item      HISTORY OF PRESENT ILLNESS   (Location/Symptom, Timing/Onset, Context/Setting, Quality, Duration, Modifying Factors, Severity)  Note limiting factors. Zenia Carpio is a 71 y.o. female who reports left lower back pain for the past 5 days. She reports that she was bending over to pick something up and felt her \"back give out\". Ever since then she reports left-sided lower back pain worse with bending and moving and better with rest.  She reports she called her primary care doctor who prescribed her a muscle relaxer which she has been taking however the patient is unaware of the name of the medication. She reports she is concerned that she \"cracked rib\" when she bent over and would like an x-ray of her ribs. She would also like additional pain medicine to help control her pain. The patient denies any leg numbness or weakness, urinary or bowel incontinence, fever, or fall to the ground. She denies any midline pain. Her pain is left of center. She has been ambulatory since the incident. HPI    Nursing Notes were reviewed. REVIEW OFSYSTEMS    (2-9 systems for level 4, 10 or more for level 5)     Review of Systems   Constitutional:  Negative for appetite change, fever and unexpected weight change. HENT:  Negative for facial swelling, trouble swallowing and voice change. Eyes:  Negative for visual disturbance. Respiratory:  Negative for shortness of breath, wheezing and stridor. Cardiovascular:  Negative for chest pain and palpitations. Gastrointestinal:  Negative for abdominal pain, nausea and vomiting.    Genitourinary:  Negative for dysuria and vaginal bleeding. Musculoskeletal:  Positive for back pain. Negative for neck pain and neck stiffness. Skin:  Negative for color change and wound. Neurological:  Negative for seizures and syncope. Psychiatric/Behavioral:  Negative for self-injury and suicidal ideas. Except as noted above the remainder of the review of systems was reviewed and negative. PAST MEDICAL HISTORY     Past Medical History:   Diagnosis Date    COPD, mild ([address]     Depression     Hypercholesteremia     Hypertension          SURGICAL HISTORY       Past Surgical History:   Procedure Laterality Date    APPENDECTOMY       SECTION      CHOLECYSTECTOMY      HYSTERECTOMY      KIDNEY REMOVAL           CURRENT MEDICATIONS       Previous Medications    ACETAMINOPHEN (APAP EXTRA STRENGTH) 500 MG TABLET    Take 2 tablets by mouth every 6 hours as needed for Pain    ALBUTEROL (PROVENTIL) (5 MG/ML) 0.5% NEBULIZER SOLUTION    Take 0.5 mLs by nebulization every 6 hours as needed for Wheezing    ALBUTEROL SULFATE  (90 BASE) MCG/ACT INHALER    Inhale 2 puffs into the lungs 4 times daily as needed for Wheezing    AMLODIPINE (NORVASC) 5 MG TABLET    Take 5 mg by mouth daily    ATORVASTATIN (LIPITOR) 40 MG TABLET    Take 40 mg by mouth daily    CALCIUM CARBONATE-VITAMIN D 500-400 MG-UNIT TABS    Take 1 tablet by mouth daily    CHOLECALCIFEROL (VITAMIN D3) 24278 UNITS CAPS    TAKE ONE CAPSULE BY MOUTH ONCE A WEEK    CLONAZEPAM (KLONOPIN) 1 MG TABLET    Take 1 tablet by mouth 2 times daily    DULOXETINE (CYMBALTA) 60 MG EXTENDED RELEASE CAPSULE    Take 60 mg by mouth daily    FLUTICASONE FUROATE-VILANTEROL (BREO ELLIPTA IN)    Inhale into the lungs    METHYLPREDNISOLONE (MEDROL, TREVOR,) 4 MG TABLET    Take by mouth. MULTIPLE VITAMINS-MINERALS (CENTROVITE) TABS    Take 1 tablet by mouth daily    NICOTINE (NICODERM CQ TD)    Place onto the skin    OXYGEN    Inhale into the lungs Patient uses as needed.   She doesn't know how much she uses. ROFLUMILAST (DALIRESP) 500 MCG TABLET    Take 500 mcg by mouth daily    SYMBICORT 160-4.5 MCG/ACT AERO    Take 1 puff by mouth every 4 hours as needed    TUDORZA PRESSAIR 400 MCG/ACT AEPB INHALER    Take 1 puff by mouth daily as needed    UMECLIDINIUM BROMIDE (INCRUSE ELLIPTA IN)    Inhale into the lungs    VITAMIN D (CHOLECALCIFEROL) 1000 UNIT TABS TABLET    Take 1 tablet by mouth daily       ALLERGIES     Patient has no known allergies. FAMILY HISTORY       Family History   Problem Relation Age of Onset    Cancer Mother         brain and throat    Heart Attack Father           SOCIAL HISTORY       Social History     Socioeconomic History    Marital status: Single   Tobacco Use    Smoking status: Every Day     Packs/day: 0.25     Years: 50.00     Pack years: 12.50     Types: Cigarettes    Smokeless tobacco: Never   Vaping Use    Vaping Use: Never used   Substance and Sexual Activity    Alcohol use: Not Currently     Comment: weekly    Drug use: No    Sexual activity: Not Currently         PHYSICAL EXAM    (up to 7 for level 4, 8 or more for level 5)     ED Triage Vitals [08/05/22 0739]   BP Temp Temp Source Heart Rate Resp SpO2 Height Weight   136/84 97.8 °F (36.6 °C) Oral 98 18 94 % 5' 1\" (1.549 m) 187 lb 6.3 oz (85 kg)       Physical Exam  Vitals and nursing note reviewed. Constitutional:       Appearance: She is well-developed. She is not diaphoretic. HENT:      Head: Normocephalic and atraumatic. Right Ear: External ear normal.      Left Ear: External ear normal.   Eyes:      Conjunctiva/sclera: Conjunctivae normal.   Neck:      Vascular: No JVD. Trachea: No tracheal deviation. Cardiovascular:      Rate and Rhythm: Normal rate. Pulmonary:      Effort: Pulmonary effort is normal. No respiratory distress. Breath sounds: Normal breath sounds. No wheezing. Abdominal:      General: There is no distension. Palpations: Abdomen is soft. Tenderness:  There is no abdominal tenderness. There is no guarding or rebound. Musculoskeletal:         General: Tenderness present. No swelling, deformity or signs of injury. Normal range of motion. Cervical back: Neck supple. Comments: Left-sided paraspinal lumbar back tenderness. No rash or visual abnormality. No midline tenderness. Skin:     General: Skin is warm and dry. Neurological:      Mental Status: She is alert. Cranial Nerves: No cranial nerve deficit. Comments: No saddle anesthesia. Sensation intact in all nerve distributions of bilateral lower extremities. 5 out of 5 strength in bilateral lower extremities. 2+ patellar reflexes equal bilaterally. Patient ambulatory in own power. DIAGNOSTIC RESULTS       RADIOLOGY:     Interpretation per the Radiologist below, if available at the time of this note:    XR RIBS LEFT (2 VIEWS)   Final Result   No new acute displaced rib fractures visualized. LABS:  Labs Reviewed   URINALYSIS WITH REFLEX TO CULTURE       All otherlabs were within normal range or not returned as of this dictation. EMERGENCY DEPARTMENT COURSE and DIFFERENTIAL DIAGNOSIS/MDM:   Vitals:    Vitals:    08/05/22 0739 08/05/22 1007   BP: 136/84 110/69   Pulse: 98 87   Resp: 18    Temp: 97.8 °F (36.6 °C)    TempSrc: Oral    SpO2: 94%    Weight: 187 lb 6.3 oz (85 kg)    Height: 5' 1\" (1.549 m)            MDM  All vital signs are within normal notes. Patient is neurovascularly intact including pulses 2 left lower extremity. Her pain is reproducible to palpation. She wants an x-ray of her ribs which is performed does not show any displaced fracture. The patient is also given Norflex and Toradol injections but only reports mild improvement in her symptoms. No focal neurologic deficits, no urinary or bowel incontinence, and patient is ambulatory on own power. She is able to provide a urine sample which is unremarkable in the emergency department.   We have discussed alternative pathologies such as CNS compression, hip fracture, abdominal pelvic pathology and possible further work-up simply laboratory evaluation and CT however the patient declines further work-up at this time. Standard ER return precautions given for primary care follow-up. I have offered to write the patient lidocaine patches and ibuprofen however she reports that she already has lidocaine patches, ibuprofen, and muscle relaxers at home therefore she is encouraged to take her home medications, follow-up with her primary doctor, or return to the emergency department she develops any leg numbness or weakness, urinary or bowel cons, or new or worsening symptoms. Patient expresses understanding and agreement with this plan. I estimate there is low risk for Abdominal aortic aneurysm, cauda equina syndrome, epidural mass lesion, thus I consider the discharge disposition reasonable. We have discussed the diagnosis and risks, and we agree with discharging home to follow-up with their primary doctor. We also discussed returning to the Emergency Department immediately if new or worsening symptoms occur. We have discussed the symptoms which are most concerning (e.g., saddle anesthesia, urinary or bowel incontinence or retention, changing or worsening pain) that necessitate immediate return. Procedures    FINAL IMPRESSION      1. Acute left-sided low back pain without sciatica          DISPOSITION/PLAN   DISPOSITION Decision To Discharge 08/05/2022 10:52:45 AM      PATIENT REFERRED TO:  Joel Fregoso MD  [address] [facility] [address] [facility]  [phone]    In 3 days      [address]  [address]  [phone]    If symptoms worsen    (Please note that portions of this note were completed with a voice recognition program.  Efforts were made to edit the dictations but occasionally words aremis-transcribed. )    Eric Willard MD (electronically signed)  Attending Emergency Physician           Enzo Tobias MD  08/05/22 1051

## 2022-09-12 ENCOUNTER — HOSPITAL ENCOUNTER (OUTPATIENT)
Dept: CT IMAGING | Age: 72
Discharge: HOME | End: 2022-09-12
Payer: MEDICAID

## 2022-09-12 DIAGNOSIS — R91.1 SOLITARY PULMONARY NODULE: ICD-10-CM

## 2022-09-12 PROCEDURE — 71250 CT THORAX DX C-: CPT

## 2022-09-27 ENCOUNTER — HOSPITAL ENCOUNTER (OUTPATIENT)
Dept: MRI IMAGING | Age: 72
Discharge: HOME | End: 2022-09-27
Payer: MEDICAID

## 2022-09-27 DIAGNOSIS — M48.56XA COLLAPSED VERTEBRA, NOT ELSEWHERE CLASSIFIED, LUMBAR REGION, INITIAL ENCOUNTER FOR FRACTURE (HCC): ICD-10-CM

## 2022-09-27 PROCEDURE — 72148 MRI LUMBAR SPINE W/O DYE: CPT

## 2022-11-06 ENCOUNTER — APPOINTMENT (OUTPATIENT)
Dept: GENERAL RADIOLOGY | Age: 72
DRG: 133 | End: 2022-11-06
Payer: MEDICAID

## 2022-11-06 ENCOUNTER — APPOINTMENT (OUTPATIENT)
Dept: CT IMAGING | Age: 72
DRG: 133 | End: 2022-11-06
Payer: MEDICAID

## 2022-11-06 ENCOUNTER — HOSPITAL ENCOUNTER (INPATIENT)
Age: 72
LOS: 5 days | Discharge: HOSPICE | DRG: 133 | End: 2022-11-11
Attending: EMERGENCY MEDICINE | Admitting: STUDENT IN AN ORGANIZED HEALTH CARE EDUCATION/TRAINING PROGRAM
Payer: MEDICAID

## 2022-11-06 DIAGNOSIS — I50.9 CONGESTIVE HEART FAILURE, UNSPECIFIED HF CHRONICITY, UNSPECIFIED HEART FAILURE TYPE (HCC): Primary | ICD-10-CM

## 2022-11-06 DIAGNOSIS — I50.1 PULMONARY EDEMA WITH CONGESTIVE HEART FAILURE (HCC): ICD-10-CM

## 2022-11-06 DIAGNOSIS — I21.4 NSTEMI (NON-ST ELEVATED MYOCARDIAL INFARCTION) (HCC): ICD-10-CM

## 2022-11-06 DIAGNOSIS — J96.01 ACUTE HYPOXEMIC RESPIRATORY FAILURE (HCC): ICD-10-CM

## 2022-11-06 DIAGNOSIS — R91.1 PULMONARY NODULE: ICD-10-CM

## 2022-11-06 LAB
A/G RATIO: 1.4 (ref 1.1–2.2)
A/G RATIO: 1.6 (ref 1.1–2.2)
ALBUMIN SERPL-MCNC: 4.1 G/DL (ref 3.4–5)
ALBUMIN SERPL-MCNC: 4.2 G/DL (ref 3.4–5)
ALBUMIN SERPL-MCNC: 4.2 G/DL (ref 3.4–5)
ALP BLD-CCNC: 181 U/L (ref 40–129)
ALP BLD-CCNC: 192 U/L (ref 40–129)
ALT SERPL-CCNC: 153 U/L (ref 10–40)
ALT SERPL-CCNC: 19 U/L (ref 10–40)
ANION GAP SERPL CALCULATED.3IONS-SCNC: 12 MMOL/L (ref 3–16)
ANION GAP SERPL CALCULATED.3IONS-SCNC: 15 MMOL/L (ref 3–16)
ANION GAP SERPL CALCULATED.3IONS-SCNC: 25 MMOL/L (ref 3–16)
APTT: 26.8 SEC (ref 23–34.3)
APTT: 44.3 SEC (ref 23–34.3)
APTT: >180 SEC (ref 23–34.3)
AST SERPL-CCNC: 21 U/L (ref 15–37)
AST SERPL-CCNC: 221 U/L (ref 15–37)
BASE EXCESS ARTERIAL: -6.5 MMOL/L (ref -3–3)
BASE EXCESS VENOUS: 6.4 MMOL/L (ref -3–3)
BASOPHILS ABSOLUTE: 0 K/UL (ref 0–0.2)
BASOPHILS RELATIVE PERCENT: 0.2 %
BASOPHILS RELATIVE PERCENT: 0.2 %
BASOPHILS RELATIVE PERCENT: 0.3 %
BILIRUB SERPL-MCNC: 0.3 MG/DL (ref 0–1)
BILIRUB SERPL-MCNC: 0.5 MG/DL (ref 0–1)
BUN BLDV-MCNC: 19 MG/DL (ref 7–20)
BUN BLDV-MCNC: 23 MG/DL (ref 7–20)
BUN BLDV-MCNC: 35 MG/DL (ref 7–20)
CALCIUM SERPL-MCNC: 8.4 MG/DL (ref 8.3–10.6)
CALCIUM SERPL-MCNC: 8.9 MG/DL (ref 8.3–10.6)
CALCIUM SERPL-MCNC: 9.1 MG/DL (ref 8.3–10.6)
CARBOXYHEMOGLOBIN ARTERIAL: 0.7 % (ref 0–1.5)
CHLORIDE BLD-SCNC: 100 MMOL/L (ref 99–110)
CHLORIDE BLD-SCNC: 100 MMOL/L (ref 99–110)
CHLORIDE BLD-SCNC: 102 MMOL/L (ref 99–110)
CO2: 15 MMOL/L (ref 21–32)
CO2: 25 MMOL/L (ref 21–32)
CO2: 26 MMOL/L (ref 21–32)
CREAT SERPL-MCNC: 1.1 MG/DL (ref 0.6–1.2)
CREAT SERPL-MCNC: 1.2 MG/DL (ref 0.6–1.2)
CREAT SERPL-MCNC: 1.5 MG/DL (ref 0.6–1.2)
D DIMER: 5.23 UG/ML FEU (ref 0–0.6)
EKG ATRIAL RATE: 103 BPM
EKG DIAGNOSIS: NORMAL
EKG P AXIS: 54 DEGREES
EKG P-R INTERVAL: 142 MS
EKG Q-T INTERVAL: 386 MS
EKG QRS DURATION: 134 MS
EKG QTC CALCULATION (BAZETT): 505 MS
EKG R AXIS: -5 DEGREES
EKG T AXIS: 27 DEGREES
EKG VENTRICULAR RATE: 103 BPM
EOSINOPHILS ABSOLUTE: 0 K/UL (ref 0–0.6)
EOSINOPHILS RELATIVE PERCENT: 0 %
GFR SERPL CREATININE-BSD FRML MDRD: 37 ML/MIN/{1.73_M2}
GFR SERPL CREATININE-BSD FRML MDRD: 48 ML/MIN/{1.73_M2}
GFR SERPL CREATININE-BSD FRML MDRD: 53 ML/MIN/{1.73_M2}
GLUCOSE BLD-MCNC: 166 MG/DL (ref 70–99)
GLUCOSE BLD-MCNC: 172 MG/DL (ref 70–99)
GLUCOSE BLD-MCNC: 177 MG/DL (ref 70–99)
GLUCOSE BLD-MCNC: 214 MG/DL (ref 70–99)
GLUCOSE BLD-MCNC: 227 MG/DL (ref 70–99)
HCO3 ARTERIAL: 22.9 MMOL/L (ref 21–29)
HCO3 VENOUS: 32.4 MMOL/L (ref 23–29)
HCT VFR BLD CALC: 36.4 % (ref 36–48)
HCT VFR BLD CALC: 38.6 % (ref 36–48)
HCT VFR BLD CALC: 40.9 % (ref 36–48)
HEMOGLOBIN, ART, EXTENDED: 12.9 G/DL (ref 12–16)
HEMOGLOBIN: 12.1 G/DL (ref 12–16)
HEMOGLOBIN: 13.3 G/DL (ref 12–16)
HEMOGLOBIN: 13.4 G/DL (ref 12–16)
INR BLD: 1.06 (ref 0.87–1.14)
LACTIC ACID, SEPSIS: 1 MMOL/L (ref 0.4–1.9)
LACTIC ACID: 1.3 MMOL/L (ref 0.4–2)
LACTIC ACID: 8.1 MMOL/L (ref 0.4–2)
LYMPHOCYTES ABSOLUTE: 0.6 K/UL (ref 1–5.1)
LYMPHOCYTES ABSOLUTE: 0.9 K/UL (ref 1–5.1)
LYMPHOCYTES ABSOLUTE: 1 K/UL (ref 1–5.1)
LYMPHOCYTES RELATIVE PERCENT: 11.5 %
LYMPHOCYTES RELATIVE PERCENT: 8.4 %
LYMPHOCYTES RELATIVE PERCENT: 9.8 %
MAGNESIUM: 2 MG/DL (ref 1.8–2.4)
MCH RBC QN AUTO: 31.2 PG (ref 26–34)
MCH RBC QN AUTO: 31.3 PG (ref 26–34)
MCH RBC QN AUTO: 32 PG (ref 26–34)
MCHC RBC AUTO-ENTMCNC: 32.8 G/DL (ref 31–36)
MCHC RBC AUTO-ENTMCNC: 33.2 G/DL (ref 31–36)
MCHC RBC AUTO-ENTMCNC: 34.4 G/DL (ref 31–36)
MCV RBC AUTO: 93 FL (ref 80–100)
MCV RBC AUTO: 94.3 FL (ref 80–100)
MCV RBC AUTO: 95.2 FL (ref 80–100)
METHEMOGLOBIN ARTERIAL: 0.3 %
MONOCYTES ABSOLUTE: 0.3 K/UL (ref 0–1.3)
MONOCYTES ABSOLUTE: 0.7 K/UL (ref 0–1.3)
MONOCYTES ABSOLUTE: 0.8 K/UL (ref 0–1.3)
MONOCYTES RELATIVE PERCENT: 3.1 %
MONOCYTES RELATIVE PERCENT: 9.4 %
MONOCYTES RELATIVE PERCENT: 9.6 %
NEUTROPHILS ABSOLUTE: 5.7 K/UL (ref 1.7–7.7)
NEUTROPHILS ABSOLUTE: 6.6 K/UL (ref 1.7–7.7)
NEUTROPHILS ABSOLUTE: 8.2 K/UL (ref 1.7–7.7)
NEUTROPHILS RELATIVE PERCENT: 78.8 %
NEUTROPHILS RELATIVE PERCENT: 81.8 %
NEUTROPHILS RELATIVE PERCENT: 86.9 %
O2 SAT, ARTERIAL: 99.6 %
O2 SAT, VEN: 98 %
O2 THERAPY: ABNORMAL
O2 THERAPY: ABNORMAL
PCO2 ARTERIAL: 63.2 MMHG (ref 35–45)
PCO2, VEN: 61.8 MMHG (ref 40–50)
PDW BLD-RTO: 13.9 % (ref 12.4–15.4)
PDW BLD-RTO: 14 % (ref 12.4–15.4)
PDW BLD-RTO: 14.6 % (ref 12.4–15.4)
PERFORMED ON: ABNORMAL
PERFORMED ON: ABNORMAL
PH ARTERIAL: 7.17 (ref 7.35–7.45)
PH VENOUS: 7.33 (ref 7.35–7.45)
PHOSPHORUS: 4.6 MG/DL (ref 2.5–4.9)
PLATELET # BLD: 198 K/UL (ref 135–450)
PLATELET # BLD: 238 K/UL (ref 135–450)
PLATELET # BLD: 245 K/UL (ref 135–450)
PLATELET SLIDE REVIEW: ADEQUATE
PMV BLD AUTO: 7.5 FL (ref 5–10.5)
PMV BLD AUTO: 7.9 FL (ref 5–10.5)
PMV BLD AUTO: 8 FL (ref 5–10.5)
PO2 ARTERIAL: 474 MMHG (ref 75–108)
PO2, VEN: 114.1 MMHG (ref 25–40)
POTASSIUM REFLEX MAGNESIUM: 4.4 MMOL/L (ref 3.5–5.1)
POTASSIUM REFLEX MAGNESIUM: 4.5 MMOL/L (ref 3.5–5.1)
POTASSIUM SERPL-SCNC: 4.3 MMOL/L (ref 3.5–5.1)
PRO-BNP: 8318 PG/ML (ref 0–124)
PROTHROMBIN TIME: 13.7 SEC (ref 11.7–14.5)
RAPID INFLUENZA  B AGN: NEGATIVE
RAPID INFLUENZA A AGN: NEGATIVE
RBC # BLD: 3.86 M/UL (ref 4–5.2)
RBC # BLD: 4.15 M/UL (ref 4–5.2)
RBC # BLD: 4.3 M/UL (ref 4–5.2)
SARS-COV-2, NAAT: NOT DETECTED
SLIDE REVIEW: ABNORMAL
SMUDGE CELLS: PRESENT
SODIUM BLD-SCNC: 140 MMOL/L (ref 136–145)
TCO2 ARTERIAL: 24.9 MMOL/L
TCO2 CALC VENOUS: 34 MMOL/L
TOTAL PROTEIN: 6.8 G/DL (ref 6.4–8.2)
TOTAL PROTEIN: 7.2 G/DL (ref 6.4–8.2)
TROPONIN: 0.03 NG/ML
TROPONIN: 0.07 NG/ML
TROPONIN: 0.08 NG/ML
WBC # BLD: 7 K/UL (ref 4–11)
WBC # BLD: 8.4 K/UL (ref 4–11)
WBC # BLD: 9.4 K/UL (ref 4–11)

## 2022-11-06 PROCEDURE — 84484 ASSAY OF TROPONIN QUANT: CPT

## 2022-11-06 PROCEDURE — 85025 COMPLETE CBC W/AUTO DIFF WBC: CPT

## 2022-11-06 PROCEDURE — 94002 VENT MGMT INPAT INIT DAY: CPT

## 2022-11-06 PROCEDURE — 85730 THROMBOPLASTIN TIME PARTIAL: CPT

## 2022-11-06 PROCEDURE — 6360000002 HC RX W HCPCS: Performed by: STUDENT IN AN ORGANIZED HEALTH CARE EDUCATION/TRAINING PROGRAM

## 2022-11-06 PROCEDURE — C1751 CATH, INF, PER/CENT/MIDLINE: HCPCS

## 2022-11-06 PROCEDURE — 31500 INSERT EMERGENCY AIRWAY: CPT

## 2022-11-06 PROCEDURE — 36600 WITHDRAWAL OF ARTERIAL BLOOD: CPT

## 2022-11-06 PROCEDURE — 99285 EMERGENCY DEPT VISIT HI MDM: CPT

## 2022-11-06 PROCEDURE — 36415 COLL VENOUS BLD VENIPUNCTURE: CPT

## 2022-11-06 PROCEDURE — 83735 ASSAY OF MAGNESIUM: CPT

## 2022-11-06 PROCEDURE — 83605 ASSAY OF LACTIC ACID: CPT

## 2022-11-06 PROCEDURE — 6360000002 HC RX W HCPCS: Performed by: INTERNAL MEDICINE

## 2022-11-06 PROCEDURE — 80053 COMPREHEN METABOLIC PANEL: CPT

## 2022-11-06 PROCEDURE — 71045 X-RAY EXAM CHEST 1 VIEW: CPT

## 2022-11-06 PROCEDURE — 2500000003 HC RX 250 WO HCPCS: Performed by: EMERGENCY MEDICINE

## 2022-11-06 PROCEDURE — 02HV33Z INSERTION OF INFUSION DEVICE INTO SUPERIOR VENA CAVA, PERCUTANEOUS APPROACH: ICD-10-PCS | Performed by: INTERNAL MEDICINE

## 2022-11-06 PROCEDURE — 70450 CT HEAD/BRAIN W/O DYE: CPT

## 2022-11-06 PROCEDURE — 6370000000 HC RX 637 (ALT 250 FOR IP): Performed by: INTERNAL MEDICINE

## 2022-11-06 PROCEDURE — 96374 THER/PROPH/DIAG INJ IV PUSH: CPT

## 2022-11-06 PROCEDURE — 2700000000 HC OXYGEN THERAPY PER DAY

## 2022-11-06 PROCEDURE — 94640 AIRWAY INHALATION TREATMENT: CPT

## 2022-11-06 PROCEDURE — 6370000000 HC RX 637 (ALT 250 FOR IP): Performed by: STUDENT IN AN ORGANIZED HEALTH CARE EDUCATION/TRAINING PROGRAM

## 2022-11-06 PROCEDURE — 36569 INSJ PICC 5 YR+ W/O IMAGING: CPT

## 2022-11-06 PROCEDURE — 0BH17EZ INSERTION OF ENDOTRACHEAL AIRWAY INTO TRACHEA, VIA NATURAL OR ARTIFICIAL OPENING: ICD-10-PCS | Performed by: INTERNAL MEDICINE

## 2022-11-06 PROCEDURE — 2580000003 HC RX 258: Performed by: STUDENT IN AN ORGANIZED HEALTH CARE EDUCATION/TRAINING PROGRAM

## 2022-11-06 PROCEDURE — 82803 BLOOD GASES ANY COMBINATION: CPT

## 2022-11-06 PROCEDURE — 93005 ELECTROCARDIOGRAM TRACING: CPT | Performed by: EMERGENCY MEDICINE

## 2022-11-06 PROCEDURE — 6370000000 HC RX 637 (ALT 250 FOR IP): Performed by: EMERGENCY MEDICINE

## 2022-11-06 PROCEDURE — 96375 TX/PRO/DX INJ NEW DRUG ADDON: CPT

## 2022-11-06 PROCEDURE — 87804 INFLUENZA ASSAY W/OPTIC: CPT

## 2022-11-06 PROCEDURE — 6360000004 HC RX CONTRAST MEDICATION: Performed by: INTERNAL MEDICINE

## 2022-11-06 PROCEDURE — 87635 SARS-COV-2 COVID-19 AMP PRB: CPT

## 2022-11-06 PROCEDURE — 5A1945Z RESPIRATORY VENTILATION, 24-96 CONSECUTIVE HOURS: ICD-10-PCS | Performed by: INTERNAL MEDICINE

## 2022-11-06 PROCEDURE — 85379 FIBRIN DEGRADATION QUANT: CPT

## 2022-11-06 PROCEDURE — 93010 ELECTROCARDIOGRAM REPORT: CPT | Performed by: INTERNAL MEDICINE

## 2022-11-06 PROCEDURE — 2500000003 HC RX 250 WO HCPCS: Performed by: STUDENT IN AN ORGANIZED HEALTH CARE EDUCATION/TRAINING PROGRAM

## 2022-11-06 PROCEDURE — 85610 PROTHROMBIN TIME: CPT

## 2022-11-06 PROCEDURE — 83880 ASSAY OF NATRIURETIC PEPTIDE: CPT

## 2022-11-06 PROCEDURE — 70498 CT ANGIOGRAPHY NECK: CPT

## 2022-11-06 PROCEDURE — 6360000002 HC RX W HCPCS: Performed by: EMERGENCY MEDICINE

## 2022-11-06 PROCEDURE — 87040 BLOOD CULTURE FOR BACTERIA: CPT

## 2022-11-06 PROCEDURE — 99223 1ST HOSP IP/OBS HIGH 75: CPT | Performed by: INTERNAL MEDICINE

## 2022-11-06 PROCEDURE — 94761 N-INVAS EAR/PLS OXIMETRY MLT: CPT

## 2022-11-06 PROCEDURE — 2500000003 HC RX 250 WO HCPCS: Performed by: INTERNAL MEDICINE

## 2022-11-06 PROCEDURE — 2000000000 HC ICU R&B

## 2022-11-06 RX ORDER — ATORVASTATIN CALCIUM 80 MG/1
80 TABLET, FILM COATED ORAL NIGHTLY
Status: DISCONTINUED | OUTPATIENT
Start: 2022-11-06 | End: 2022-11-07

## 2022-11-06 RX ORDER — SODIUM CHLORIDE 9 MG/ML
INJECTION, SOLUTION INTRAVENOUS PRN
Status: DISCONTINUED | OUTPATIENT
Start: 2022-11-06 | End: 2022-11-11 | Stop reason: HOSPADM

## 2022-11-06 RX ORDER — SODIUM CHLORIDE, SODIUM LACTATE, POTASSIUM CHLORIDE, AND CALCIUM CHLORIDE .6; .31; .03; .02 G/100ML; G/100ML; G/100ML; G/100ML
250 INJECTION, SOLUTION INTRAVENOUS ONCE
Status: DISCONTINUED | OUTPATIENT
Start: 2022-11-06 | End: 2022-11-06

## 2022-11-06 RX ORDER — IPRATROPIUM BROMIDE AND ALBUTEROL SULFATE 2.5; .5 MG/3ML; MG/3ML
1 SOLUTION RESPIRATORY (INHALATION) EVERY 4 HOURS PRN
Status: DISCONTINUED | OUTPATIENT
Start: 2022-11-06 | End: 2022-11-11 | Stop reason: HOSPADM

## 2022-11-06 RX ORDER — ASPIRIN 81 MG/1
81 TABLET, CHEWABLE ORAL DAILY
Status: DISCONTINUED | OUTPATIENT
Start: 2022-11-07 | End: 2022-11-08

## 2022-11-06 RX ORDER — HEPARIN SODIUM 1000 [USP'U]/ML
2000 INJECTION, SOLUTION INTRAVENOUS; SUBCUTANEOUS PRN
Status: DISCONTINUED | OUTPATIENT
Start: 2022-11-06 | End: 2022-11-06

## 2022-11-06 RX ORDER — ATORVASTATIN CALCIUM 80 MG/1
80 TABLET, FILM COATED ORAL NIGHTLY
Status: CANCELLED | OUTPATIENT
Start: 2022-11-06

## 2022-11-06 RX ORDER — METHYLPREDNISOLONE SODIUM SUCCINATE 125 MG/2ML
125 INJECTION, POWDER, LYOPHILIZED, FOR SOLUTION INTRAMUSCULAR; INTRAVENOUS ONCE
Status: COMPLETED | OUTPATIENT
Start: 2022-11-06 | End: 2022-11-06

## 2022-11-06 RX ORDER — ROFLUMILAST 500 UG/1
500 TABLET ORAL DAILY
Status: DISCONTINUED | OUTPATIENT
Start: 2022-11-06 | End: 2022-11-11 | Stop reason: HOSPADM

## 2022-11-06 RX ORDER — SODIUM CHLORIDE 9 MG/ML
INJECTION, SOLUTION INTRAVENOUS PRN
Status: DISCONTINUED | OUTPATIENT
Start: 2022-11-06 | End: 2022-11-06 | Stop reason: SDUPTHER

## 2022-11-06 RX ORDER — HEPARIN SODIUM 1000 [USP'U]/ML
4000 INJECTION, SOLUTION INTRAVENOUS; SUBCUTANEOUS ONCE
Status: COMPLETED | OUTPATIENT
Start: 2022-11-06 | End: 2022-11-06

## 2022-11-06 RX ORDER — FUROSEMIDE 10 MG/ML
40 INJECTION INTRAMUSCULAR; INTRAVENOUS 2 TIMES DAILY
Status: DISCONTINUED | OUTPATIENT
Start: 2022-11-06 | End: 2022-11-10

## 2022-11-06 RX ORDER — DULOXETIN HYDROCHLORIDE 60 MG/1
60 CAPSULE, DELAYED RELEASE ORAL DAILY
Status: DISCONTINUED | OUTPATIENT
Start: 2022-11-06 | End: 2022-11-06

## 2022-11-06 RX ORDER — PROPOFOL 10 MG/ML
5-50 INJECTION, EMULSION INTRAVENOUS CONTINUOUS
Status: DISCONTINUED | OUTPATIENT
Start: 2022-11-06 | End: 2022-11-07

## 2022-11-06 RX ORDER — SODIUM CHLORIDE 0.9 % (FLUSH) 0.9 %
5-40 SYRINGE (ML) INJECTION EVERY 12 HOURS SCHEDULED
Status: DISCONTINUED | OUTPATIENT
Start: 2022-11-06 | End: 2022-11-06 | Stop reason: SDUPTHER

## 2022-11-06 RX ORDER — EPINEPHRINE 0.1 MG/ML
SYRINGE (ML) INJECTION
Status: COMPLETED | OUTPATIENT
Start: 2022-11-06 | End: 2022-11-06

## 2022-11-06 RX ORDER — METHYLPREDNISOLONE SODIUM SUCCINATE 40 MG/ML
40 INJECTION, POWDER, LYOPHILIZED, FOR SOLUTION INTRAMUSCULAR; INTRAVENOUS EVERY 6 HOURS
Status: DISCONTINUED | OUTPATIENT
Start: 2022-11-06 | End: 2022-11-07

## 2022-11-06 RX ORDER — HEPARIN SODIUM 1000 [USP'U]/ML
80 INJECTION, SOLUTION INTRAVENOUS; SUBCUTANEOUS ONCE
Status: DISCONTINUED | OUTPATIENT
Start: 2022-11-06 | End: 2022-11-06

## 2022-11-06 RX ORDER — HEPARIN SODIUM 1000 [USP'U]/ML
60 INJECTION, SOLUTION INTRAVENOUS; SUBCUTANEOUS PRN
Status: DISCONTINUED | OUTPATIENT
Start: 2022-11-06 | End: 2022-11-06 | Stop reason: SDUPTHER

## 2022-11-06 RX ORDER — HEPARIN SODIUM 10000 [USP'U]/100ML
5-30 INJECTION, SOLUTION INTRAVENOUS CONTINUOUS
Status: DISCONTINUED | OUTPATIENT
Start: 2022-11-06 | End: 2022-11-06 | Stop reason: SDUPTHER

## 2022-11-06 RX ORDER — HEPARIN SODIUM 10000 [USP'U]/100ML
0-4000 INJECTION, SOLUTION INTRAVENOUS CONTINUOUS
Status: DISCONTINUED | OUTPATIENT
Start: 2022-11-06 | End: 2022-11-08

## 2022-11-06 RX ORDER — HEPARIN SODIUM 10000 [USP'U]/100ML
0-4000 INJECTION, SOLUTION INTRAVENOUS CONTINUOUS
Status: DISCONTINUED | OUTPATIENT
Start: 2022-11-06 | End: 2022-11-06

## 2022-11-06 RX ORDER — ASPIRIN 81 MG/1
324 TABLET, CHEWABLE ORAL ONCE
Status: COMPLETED | OUTPATIENT
Start: 2022-11-06 | End: 2022-11-06

## 2022-11-06 RX ORDER — FUROSEMIDE 10 MG/ML
40 INJECTION INTRAMUSCULAR; INTRAVENOUS ONCE
Status: COMPLETED | OUTPATIENT
Start: 2022-11-06 | End: 2022-11-06

## 2022-11-06 RX ORDER — SODIUM CHLORIDE 0.9 % (FLUSH) 0.9 %
5-40 SYRINGE (ML) INJECTION PRN
Status: DISCONTINUED | OUTPATIENT
Start: 2022-11-06 | End: 2022-11-06 | Stop reason: SDUPTHER

## 2022-11-06 RX ORDER — FENTANYL CITRATE-0.9 % NACL/PF 10 MCG/ML
25-200 PLASTIC BAG, INJECTION (ML) INTRAVENOUS CONTINUOUS
Status: DISCONTINUED | OUTPATIENT
Start: 2022-11-06 | End: 2022-11-07

## 2022-11-06 RX ORDER — ONDANSETRON 2 MG/ML
4 INJECTION INTRAMUSCULAR; INTRAVENOUS EVERY 6 HOURS PRN
Status: DISCONTINUED | OUTPATIENT
Start: 2022-11-06 | End: 2022-11-08

## 2022-11-06 RX ORDER — SODIUM CHLORIDE 0.9 % (FLUSH) 0.9 %
5-40 SYRINGE (ML) INJECTION PRN
Status: DISCONTINUED | OUTPATIENT
Start: 2022-11-06 | End: 2022-11-11 | Stop reason: HOSPADM

## 2022-11-06 RX ORDER — HEPARIN SODIUM 1000 [USP'U]/ML
80 INJECTION, SOLUTION INTRAVENOUS; SUBCUTANEOUS PRN
Status: DISCONTINUED | OUTPATIENT
Start: 2022-11-06 | End: 2022-11-06

## 2022-11-06 RX ORDER — HEPARIN SODIUM 1000 [USP'U]/ML
4000 INJECTION, SOLUTION INTRAVENOUS; SUBCUTANEOUS PRN
Status: DISCONTINUED | OUTPATIENT
Start: 2022-11-06 | End: 2022-11-06

## 2022-11-06 RX ORDER — CLONAZEPAM 1 MG/1
1 TABLET ORAL 2 TIMES DAILY
Status: DISCONTINUED | OUTPATIENT
Start: 2022-11-06 | End: 2022-11-06

## 2022-11-06 RX ORDER — HEPARIN SODIUM 1000 [USP'U]/ML
60 INJECTION, SOLUTION INTRAVENOUS; SUBCUTANEOUS ONCE
Status: DISCONTINUED | OUTPATIENT
Start: 2022-11-06 | End: 2022-11-06 | Stop reason: SDUPTHER

## 2022-11-06 RX ORDER — SODIUM CHLORIDE 0.9 % (FLUSH) 0.9 %
5-40 SYRINGE (ML) INJECTION EVERY 12 HOURS SCHEDULED
Status: DISCONTINUED | OUTPATIENT
Start: 2022-11-06 | End: 2022-11-11 | Stop reason: HOSPADM

## 2022-11-06 RX ORDER — ACETAMINOPHEN 650 MG/1
650 SUPPOSITORY RECTAL EVERY 6 HOURS PRN
Status: DISCONTINUED | OUTPATIENT
Start: 2022-11-06 | End: 2022-11-11 | Stop reason: HOSPADM

## 2022-11-06 RX ORDER — HEPARIN SODIUM 1000 [USP'U]/ML
30 INJECTION, SOLUTION INTRAVENOUS; SUBCUTANEOUS PRN
Status: DISCONTINUED | OUTPATIENT
Start: 2022-11-06 | End: 2022-11-06 | Stop reason: SDUPTHER

## 2022-11-06 RX ORDER — CLONAZEPAM 0.5 MG/1
0.25 TABLET ORAL EVERY 12 HOURS PRN
Status: DISCONTINUED | OUTPATIENT
Start: 2022-11-06 | End: 2022-11-11 | Stop reason: HOSPADM

## 2022-11-06 RX ORDER — ACETAMINOPHEN 325 MG/1
650 TABLET ORAL EVERY 6 HOURS PRN
Status: DISCONTINUED | OUTPATIENT
Start: 2022-11-06 | End: 2022-11-11 | Stop reason: HOSPADM

## 2022-11-06 RX ORDER — HEPARIN SODIUM 1000 [USP'U]/ML
40 INJECTION, SOLUTION INTRAVENOUS; SUBCUTANEOUS PRN
Status: DISCONTINUED | OUTPATIENT
Start: 2022-11-06 | End: 2022-11-06

## 2022-11-06 RX ORDER — GUAIFENESIN/DEXTROMETHORPHAN 100-10MG/5
5 SYRUP ORAL EVERY 4 HOURS PRN
Status: DISCONTINUED | OUTPATIENT
Start: 2022-11-06 | End: 2022-11-11 | Stop reason: HOSPADM

## 2022-11-06 RX ADMIN — HEPARIN SODIUM 4000 UNITS: 1000 INJECTION INTRAVENOUS; SUBCUTANEOUS at 10:43

## 2022-11-06 RX ADMIN — SODIUM CHLORIDE, PRESERVATIVE FREE 10 ML: 5 INJECTION INTRAVENOUS at 09:35

## 2022-11-06 RX ADMIN — ONDANSETRON 4 MG: 2 INJECTION INTRAMUSCULAR; INTRAVENOUS at 04:55

## 2022-11-06 RX ADMIN — CLONAZEPAM 0.25 MG: 0.5 TABLET ORAL at 14:15

## 2022-11-06 RX ADMIN — PROPOFOL 20 MCG/KG/MIN: 10 INJECTION, EMULSION INTRAVENOUS at 18:40

## 2022-11-06 RX ADMIN — MICONAZOLE NITRATE: 2 POWDER TOPICAL at 22:20

## 2022-11-06 RX ADMIN — HEPARIN SODIUM 4000 UNITS: 1000 INJECTION INTRAVENOUS; SUBCUTANEOUS at 02:33

## 2022-11-06 RX ADMIN — GUAIFENESIN SYRUP AND DEXTROMETHORPHAN 5 ML: 100; 10 SYRUP ORAL at 04:32

## 2022-11-06 RX ADMIN — METHYLPREDNISOLONE SODIUM SUCCINATE 40 MG: 40 INJECTION, POWDER, FOR SOLUTION INTRAMUSCULAR; INTRAVENOUS at 22:20

## 2022-11-06 RX ADMIN — TIOTROPIUM BROMIDE INHALATION SPRAY 2 PUFF: 3.12 SPRAY, METERED RESPIRATORY (INHALATION) at 08:48

## 2022-11-06 RX ADMIN — MOMETASONE FUROATE AND FORMOTEROL FUMARATE DIHYDRATE 2 PUFF: 200; 5 AEROSOL RESPIRATORY (INHALATION) at 19:50

## 2022-11-06 RX ADMIN — GUAIFENESIN SYRUP AND DEXTROMETHORPHAN 5 ML: 100; 10 SYRUP ORAL at 09:32

## 2022-11-06 RX ADMIN — MICONAZOLE NITRATE: 2 POWDER TOPICAL at 09:42

## 2022-11-06 RX ADMIN — ROFLUMILAST 500 MCG: 500 TABLET ORAL at 09:42

## 2022-11-06 RX ADMIN — METHYLPREDNISOLONE SODIUM SUCCINATE 40 MG: 40 INJECTION, POWDER, FOR SOLUTION INTRAMUSCULAR; INTRAVENOUS at 18:43

## 2022-11-06 RX ADMIN — FUROSEMIDE 40 MG: 10 INJECTION, SOLUTION INTRAMUSCULAR; INTRAVENOUS at 09:32

## 2022-11-06 RX ADMIN — ATORVASTATIN CALCIUM 80 MG: 80 TABLET, FILM COATED ORAL at 21:25

## 2022-11-06 RX ADMIN — MOMETASONE FUROATE AND FORMOTEROL FUMARATE DIHYDRATE 2 PUFF: 200; 5 AEROSOL RESPIRATORY (INHALATION) at 08:48

## 2022-11-06 RX ADMIN — Medication 50 MCG/HR: at 19:40

## 2022-11-06 RX ADMIN — METHYLPREDNISOLONE SODIUM SUCCINATE 40 MG: 40 INJECTION, POWDER, FOR SOLUTION INTRAMUSCULAR; INTRAVENOUS at 10:40

## 2022-11-06 RX ADMIN — IOPAMIDOL 75 ML: 755 INJECTION, SOLUTION INTRAVENOUS at 18:16

## 2022-11-06 RX ADMIN — CALCIUM CARBONATE-VITAMIN D TAB 500 MG-200 UNIT 1 TABLET: 500-200 TAB at 13:30

## 2022-11-06 RX ADMIN — FUROSEMIDE 40 MG: 10 INJECTION, SOLUTION INTRAMUSCULAR; INTRAVENOUS at 02:33

## 2022-11-06 RX ADMIN — SODIUM CHLORIDE, PRESERVATIVE FREE 10 ML: 5 INJECTION INTRAVENOUS at 21:25

## 2022-11-06 RX ADMIN — AZITHROMYCIN DIHYDRATE 500 MG: 500 INJECTION, POWDER, LYOPHILIZED, FOR SOLUTION INTRAVENOUS at 04:38

## 2022-11-06 RX ADMIN — EPINEPHRINE 1 MG: 0.1 INJECTION INTRACARDIAC; INTRAVENOUS at 16:16

## 2022-11-06 RX ADMIN — METHYLPREDNISOLONE SODIUM SUCCINATE 125 MG: 125 INJECTION, POWDER, FOR SOLUTION INTRAMUSCULAR; INTRAVENOUS at 04:32

## 2022-11-06 RX ADMIN — ASPIRIN 81 MG 324 MG: 81 TABLET ORAL at 02:32

## 2022-11-06 RX ADMIN — HEPARIN SODIUM 1090 UNITS/HR: 10000 INJECTION, SOLUTION INTRAVENOUS at 19:52

## 2022-11-06 RX ADMIN — NITROGLYCERIN 1 INCH: 20 OINTMENT TOPICAL at 02:32

## 2022-11-06 RX ADMIN — EPINEPHRINE 1 MG: 0.1 INJECTION INTRACARDIAC; INTRAVENOUS at 14:13

## 2022-11-06 RX ADMIN — HEPARIN SODIUM 1000 UNITS/HR: 10000 INJECTION, SOLUTION INTRAVENOUS at 02:35

## 2022-11-06 ASSESSMENT — PULMONARY FUNCTION TESTS
PIF_VALUE: 30
PIF_VALUE: 30
PIF_VALUE: 33
PIF_VALUE: 31
PIF_VALUE: 33
PIF_VALUE: 30
PIF_VALUE: 29
PIF_VALUE: 27
PIF_VALUE: 35
PIF_VALUE: 37
PIF_VALUE: 32
PIF_VALUE: 28
PIF_VALUE: 32
PIF_VALUE: 29
PIF_VALUE: 33
PIF_VALUE: 27
PIF_VALUE: 40
PIF_VALUE: 30
PIF_VALUE: 33
PIF_VALUE: 27
PIF_VALUE: 33
PIF_VALUE: 31
PIF_VALUE: 33
PIF_VALUE: 32
PIF_VALUE: 30
PIF_VALUE: 32
PIF_VALUE: 29
PIF_VALUE: 35

## 2022-11-06 ASSESSMENT — PAIN SCALES - GENERAL
PAINLEVEL_OUTOF10: 0

## 2022-11-06 ASSESSMENT — PAIN - FUNCTIONAL ASSESSMENT: PAIN_FUNCTIONAL_ASSESSMENT: NONE - DENIES PAIN

## 2022-11-06 NOTE — PROGRESS NOTES
4 Eyes Skin Assessment     NAME:  Constanza Rodríguez  YOB: 1950  MEDICAL RECORD NUMBER:  [id number]    The patient is being assess for  Admission    I agree that 2 RN's have performed a thorough Head to Toe Skin Assessment on the patient. ALL assessment sites listed below have been assessed. Areas assessed by both nurses:    Head, Face, Ears, Shoulders, Back, Chest, Arms, Elbows, Hands, Sacrum. Buttock, Coccyx, Ischium, and Legs. Feet and Heels        Does the Patient have a Wound?  No noted wound(s)       Ajit Prevention initiated:  Yes   Wound Care Orders initiated:  No    Pressure Injury (Stage 3,4, Unstageable, DTI, NWPT, and Complex wounds) if present place referral/consult order under [de-identified] No    New and Established Ostomies if present place consult order under : No      Nurse 1 eSignature: Electronically signed by Mey Tilley RN on 11/6/22 at 5:03 AM EST    **SHARE this note so that the co-signing nurse is able to place an eSignature**    Nurse 2 eSignature: Electronically signed by Susie Zambrano RN on 11/6/22 at 5:04 AM EST

## 2022-11-06 NOTE — PROGRESS NOTES
Clinical Pharmacy Note  Heparin Dosing       Lab Results   Component Value Date/Time    APTT 44.3 11/06/2022 09:17 AM     Lab Results   Component Value Date/Time    HGB 12.1 11/06/2022 05:37 AM    HCT 36.4 11/06/2022 05:37 AM     11/06/2022 05:37 AM       Current Infusion Rate: 1000 units/hr    Plan:  Bolus: 4000 units  Rate: increase to 1340 units/hr  Next aPTT: 11/06/22 1700    Pharmacy will continue to monitor and adjust based on aPTT results.   Courtney Escalera [facility],11/6/2022,10:22 AM

## 2022-11-06 NOTE — PROGRESS NOTES
Pt arrived via stretcher from ED to room 8370. Heart monitor connected and verified with [facility]. VS, assessment, and admission complete. 4 eyes assessment complete. Pt oriented to unit and room. Call light and bedside table in reach. All questions answered. Pt resting quietly in bed with no complaints or voiced needs at this time. Will continue to monitor.

## 2022-11-06 NOTE — SIGNIFICANT EVENT
Responded to Code Blue. Patient noted to mechelle down and became unresponsive, cyanotic, and went into asystole. CPR immediately started by staff. At 3 minute joseph, 1 mg of epinephrine given, pulse check asystole, CPR resumed. Additional 1 mg of epinephrine given 3 minutes later, ROSC achieved at next pulse check. Patient intubated for airway protection. BP immediately taken after ROSC and SBP 120s. Etiology of asystole is unknown. STAT CXR, EKG, troponin, D-dimer, lactic acid, CBC with diff, CMP all ordered. Attending Dr. Ronen Ward updated and Dr. Ronen Ward in turn updated cardiology. Will send EKG tracing to cardiology and attending once available. Physical Exam  Constitutional:       General: She is in acute distress. Appearance: She is ill-appearing. HENT:      Head: Normocephalic and atraumatic. Right Ear: Tympanic membrane normal.      Left Ear: Tympanic membrane normal.      Nose: Nose normal.   Eyes:      Conjunctiva/sclera: Conjunctivae normal.      Pupils: Pupils are equal, round, and reactive to light. Cardiovascular:      Rate and Rhythm: Regular rhythm. Tachycardia present. Pulses: Normal pulses. Pulmonary:      Comments: Intubated  Abdominal:      General: Abdomen is flat. Palpations: Abdomen is soft. Musculoskeletal:         General: No swelling. Cervical back: Neck supple. Skin:     General: Skin is warm and dry. Neurological:      Comments: Unresponsive   Psychiatric:      Comments: Unresponsive     I spent 55 minutes of critical care time due to life threatening code blue requiring CPR and transfer to care to the ICU.     Kamlesh Latif MD, MPH  Hospitalist  Pager: [phone]

## 2022-11-06 NOTE — PROGRESS NOTES
Hospitalist Progress Note      PCP: Joby Miller MD    Date of Admission: 11/6/2022        Subjective: No chest, no chest pain or shortness of breath at rest no fever chills nausea or abdominal pain. Nurse at bedside      Medications:  Reviewed    Infusion Medications    heparin (PORCINE) Infusion 1,340 Units/hr (11/06/22 1046)    sodium chloride       Scheduled Medications    Roflumilast  500 mcg Oral Daily    mometasone-formoterol  2 puff Inhalation BID    tiotropium  2 puff Inhalation Daily    sodium chloride flush  5-40 mL IntraVENous 2 times per day    [START ON 11/7/2022] aspirin  81 mg Oral Daily    atorvastatin  80 mg Oral Nightly    furosemide  40 mg IntraVENous BID    azithromycin  500 mg IntraVENous Q24H    methylPREDNISolone  40 mg IntraVENous Q6H    miconazole   Topical BID     PRN Meds: sodium chloride flush, sodium chloride, acetaminophen **OR** acetaminophen, perflutren lipid microspheres, ondansetron, guaiFENesin-dextromethorphan, ipratropium-albuterol      Intake/Output Summary (Last 24 hours) at 11/6/2022 1156  Last data filed at 11/6/2022 0935  Gross per 24 hour   Intake 10 ml   Output --   Net 10 ml       Physical Exam Performed:    /62   Pulse 94   Temp 98.1 °F (36.7 °C) (Oral)   Resp 17   Ht 5' 3\" (1.6 m)   Wt 186 lb 15.2 oz (84.8 kg)   SpO2 94%   BMI 33.12 kg/m²     General appearance: No apparent distress  Neck: Supple  Respiratory: Bilateral basilar fine crackles. Scattered rhonchi  Cardiovascular: Regular rate and rhythm with normal S1/S2 without murmurs, rubs or gallops. Abdomen: Soft, non-tender, non-distended   Musculoskeletal: No clubbing, cyanosis   Skin: Skin color, texture, turgor normal.  No rashes or lesions.   Neurologic: No focal weakness  Psychiatric: Anxious  Capillary Refill: Brisk, 3 seconds, normal   Peripheral Pulses: +2 palpable, equal bilaterally       Labs:   Recent Labs     11/06/22  0100 11/06/22  0537   WBC 8.4 7.0   HGB 13.3 12.1   HCT 38.6 36.4    198     Recent Labs     11/06/22 0100 11/06/22  0537    140   K 4.4 4.3    102   CO2 25 26   BUN 19 23*   CREATININE 1.1 1.2   CALCIUM 9.1 8.4   PHOS  --  4.6     Recent Labs     11/06/22  0100   AST 21   ALT 19   BILITOT 0.3   ALKPHOS 192*     No results for input(s): INR in the last 72 hours. Recent Labs     11/06/22  0100 11/06/22  0537   TROPONINI 0.08* 0.07*       Urinalysis:      Lab Results   Component Value Date/Time    NITRU Negative 08/05/2022 08:25 AM    BLOODU Negative 08/05/2022 08:25 AM    SPECGRAV <=1.005 08/05/2022 08:25 AM    GLUCOSEU Negative 08/05/2022 08:25 AM       Radiology:  XR CHEST PORTABLE   Final Result   Mild cardiomegaly and mild pulmonary edema. 1.5 cm slightly dense nodule right upper lobe which is unchanged                 Assessment/Plan:    Active Hospital Problems    Diagnosis     Congestive heart failure ([facility] [I50.9]      Priority: Medium    NSTEMI (non-ST elevated myocardial infarction) ([facility] [I21.4]      Priority: Medium    Acute pulmonary edema (HCC) [J81.0]     Acute respiratory failure with hypoxia (HCC) [J96.01]     COPD exacerbation (HCC) [J44.1]      Acute congestive heart failure, pulmonary edema, IV Lasix continue for now strict I&O  Non-STEMI, patient was started on heparin drip on admission, echo ordered. Cardiology consulted on admission  COPD exacerbation, started on azithromycin and Solu-Medrol expecting improvement continue to monitor, keep saturation above 90%  Acute respiratory failure with hypoxia, supplemental oxygen due to COPD and heart failure, keep saturation above 90%  Essential hypertension, p.o. medications  Hypercholesterolemia statin  Minimally elevated troponin, flat, due to heart failure      Diet: ADULT DIET;  Regular  Code Status: Full Code      Moses Cosby MD

## 2022-11-06 NOTE — CONSULTS
Clinical Pharmacy Note  Heparin Dosing Consult    Richard Terrell is a 71 y.o. female ordered heparin per low dose nomogram by Dr. Frieda Yang. Lab Results   Component Value Date/Time    APTT 26.8 11/06/2022 01:00 AM     Lab Results   Component Value Date/Time    HGB 13.3 11/06/2022 01:00 AM    HCT 38.6 11/06/2022 01:00 AM     11/06/2022 01:00 AM       Ht Readings from Last 1 Encounters:   08/05/22 5' 1\" (1.549 m)        Wt Readings from Last 1 Encounters:   11/06/22 187 lb (84.8 kg)        Assessment/Plan:  Initial bolus: 4000 units  Initial infusion rate: 1000 units/hr  Next aPTT: 0900 11/6/22    Pharmacy will continue to monitor adjust heparin based on aPTT results using nomogram below:     CAD/STEMI/NSTEMI/UA/AFIB Heparin Nomogram     Initial Bolus: 60 units/kg Max Bolus: 4,000 units       Initial Rate: 12 units/kg/hr Max Initial Rate: 1,000 units/hr     aPTT < 59    Heparin 60 units/kg bolus Increase infusion by 4 units/kg/hr        (maximum 4,000 units)   aPTT 59.1-72.9 Heparin 30 units/kg bolus Increase infusion by 2 units/kg/hr        (maximum 2,000 units)   aPTT     No bolus   No change   aPTT 102.1-109 No bolus   Decrease infusion by 1 units/kg/hr   aPTT 109.1-122.9 No bolus     Decrease infusion by 2 units/kg/hr   aPTT > 123    Hold heparin for 1 hour Decrease infusion by 3 units/kg/hr     Obtain aPTT 6 hours after initial bolus and 6 hours after any dose change until two consecutive therapeutic aPTTs are achieved - then daily.     Monica Navarrete, PharmD

## 2022-11-06 NOTE — PROGRESS NOTES
Patient with restlessness, yelling out when staff enters room she states she is not able to rest or relax redirected easily for short time spans secure message sent to MD awaiting response, patient repositioned and bundled lights dimmed and curtains drawn at this time bed low alarm on call light in hand wctm

## 2022-11-06 NOTE — ED PROVIDER NOTES
Emergency Physician Note        Note Open Time: 2:10 AM EST    Chief Complaint  Shortness of Breath       History of Present Illness  Eugene Nash is a 71 y.o. female who presents to the ED for shortness of breath. Patient reports 4 days of worsening shortness of breath that became severe tonight. Her neighbor called EMS on her behalf. She denies any chest pain at any time throughout this. She denies also any fevers, chills or sweats. No vomiting or diarrhea. No significant URI symptoms. She denies also any history of heart failure. She states she has history of COPD and used to be on oxygen but is no longer. 10 systems reviewed, pertinent positives per HPI otherwise noted to be negative    I have reviewed the following from the nursing documentation:      Prior to Admission medications    Medication Sig Start Date End Date Taking? Authorizing Provider   methylPREDNISolone (MEDROL, TREVOR,) 4 MG tablet Take by mouth. 3/13/21   Ignacia Jerez MD   OXYGEN Inhale into the lungs Patient uses as needed. She doesn't know how much she uses.     Historical Provider, MD   albuterol sulfate  (90 Base) MCG/ACT inhaler Inhale 2 puffs into the lungs 4 times daily as needed for Wheezing 8/6/20   Kalie Mcdonald MD   albuterol (PROVENTIL) (5 MG/ML) 0.5% nebulizer solution Take 0.5 mLs by nebulization every 6 hours as needed for Wheezing 8/6/20   Kalie Mcdonald MD   Cholecalciferol (VITAMIN D3) 57837 units CAPS TAKE ONE CAPSULE BY MOUTH ONCE A WEEK 7/2/18   SAULO Serrano - CNP   Roflumilast (DALIRESP) 500 MCG tablet Take 500 mcg by mouth daily 4/23/18   Amari Hassan MD   Fluticasone Furoate-Vilanterol (BREO ELLIPTA IN) Inhale into the lungs    Historical Provider, MD   Umeclidinium Bromide (INCRUSE ELLIPTA IN) Inhale into the lungs    Historical Provider, MD   amLODIPine (NORVASC) 5 MG tablet Take 5 mg by mouth daily    Historical Provider, MD   Nicotine ([address]) Place onto the skin Historical Provider, MD   DULoxetine (CYMBALTA) 60 MG extended release capsule Take 60 mg by mouth daily    Historical Provider, MD   vitamin D (CHOLECALCIFEROL) 1000 UNIT TABS tablet Take 1 tablet by mouth daily 3/28/17   Elvia De Anda MD   Multiple Vitamins-Minerals (CENTROVITE) TABS Take 1 tablet by mouth daily 17   Historical Provider, MD   clonazePAM (KLONOPIN) 1 MG tablet Take 1 tablet by mouth 2 times daily 3/2/17   Historical Provider, MD   Calcium Carbonate-Vitamin D 500-400 MG-UNIT TABS Take 1 tablet by mouth daily 3/2/17   Historical Provider, MD   SYMBICORT 160-4.5 MCG/ACT AERO Take 1 puff by mouth every 4 hours as needed 17   Historical Provider, MD Mason Susan 400 MCG/ACT AEPB inhaler Take 1 puff by mouth daily as needed 17   Historical Provider, MD   acetaminophen (APAP EXTRA STRENGTH) 500 MG tablet Take 2 tablets by mouth every 6 hours as needed for Pain 16   Mark Fagan, APRN - CNP   atorvastatin (LIPITOR) 40 MG tablet Take 40 mg by mouth daily    Historical Provider, MD       Allergies as of 2022    (No Known Allergies)       Past Medical History:   Diagnosis Date    COPD, mild ([address]     Depression     Hypercholesteremia     Hypertension         Surgical History:   Past Surgical History:   Procedure Laterality Date    APPENDECTOMY       SECTION      CHOLECYSTECTOMY      HYSTERECTOMY      KIDNEY REMOVAL          Family History:    Family History   Problem Relation Age of Onset    Cancer Mother         brain and throat    Heart Attack Father        Social History     Socioeconomic History    Marital status: Single     Spouse name: Not on file    Number of children: Not on file    Years of education: Not on file    Highest education level: Not on file   Occupational History    Not on file   Tobacco Use    Smoking status: Every Day     Packs/day: 0.25     Years: 50.00     Pack years: 12.50     Types: Cigarettes    Smokeless tobacco: Never   Vaping Use    Vaping Use: Never used   Substance and Sexual Activity    Alcohol use: Not Currently     Comment: weekly    Drug use: No    Sexual activity: Not Currently   Other Topics Concern    Not on file   Social History Narrative    Not on file     Social Determinants of Health     Financial Resource Strain: Not on file   Food Insecurity: Not on file   Transportation Needs: Not on file   Physical Activity: Not on file   Stress: Not on file   Social Connections: Not on file   Intimate Partner Violence: Not on file   Housing Stability: Not on file       Nursing notes reviewed. ED Triage Vitals [11/06/22 0118]   Enc Vitals Group      BP       Pulse       Resp       Temp       Temp src       SpO2       Weight 187 lb (84.8 kg)      Height       Head Circumference       Peak Flow       Pain Score       Pain Loc       Pain Edu? Excl. in [address]? GENERAL:  Awake, alert. Well developed, well nourished with moderate severe respiratory distress. HENT:  Normocephalic, Atraumatic, moist mucous membranes. EYES:  Pupils equal round and reactive to light, Conjunctiva normal, extraocular movements normal.  NECK:  No meningeal signs, Supple. CHEST: Tachycardic and regular, chest wall non-tender. LUNGS: Tachypneic with rales noted bilaterally. ABDOMEN:  Soft, non-tender, no rebound, rigidity or guarding, non-distended, normal bowel sounds. No costovertebral angle tenderness to palpation. BACK:  No tenderness. EXTREMITIES:  Normal range of motion, trace bilateral lower extremity edema, no bony tenderness, no deformity, distal pulses present. SKIN: Cool, pale, diaphoretic and intact. NEUROLOGIC: Normal mental status. Moving all extremities to command. LABS and DIAGNOSTIC RESULTS  EKG  The Ekg interpreted by me shows  sinus tachycardia, vsvr=907    Axis is   Normal  QTc is   505ms  RBBB  ST Segments: normal  Delta waves, Brugada Syndrome, and Short MI are not present.   No significant change from prior EKG dated 11/13/2020    RADIOLOGY  X-RAYS:  I have reviewed radiologic plain film image(s). ALL OTHER NON-PLAIN FILM IMAGES SUCH AS CT, ULTRASOUND AND MRI HAVE BEEN READ BY THE RADIOLOGIST. XR CHEST PORTABLE   Final Result   Mild cardiomegaly and mild pulmonary edema. 1.5 cm slightly dense nodule right upper lobe which is unchanged              LABS  Labs Reviewed   COMPREHENSIVE METABOLIC PANEL W/ REFLEX TO MG FOR LOW K - Abnormal; Notable for the following components:       Result Value    Glucose 177 (*)     Est, Glom Filt Rate 53 (*)     Alkaline Phosphatase 192 (*)     All other components within normal limits   TROPONIN - Abnormal; Notable for the following components:    Troponin 0.08 (*)     All other components within normal limits   BRAIN NATRIURETIC PEPTIDE - Abnormal; Notable for the following components:    Pro-BNP 8,318 (*)     All other components within normal limits   BLOOD GAS, VENOUS - Abnormal; Notable for the following components:    pH, Giorgio 7.328 (*)     pCO2, Giorgio 61.8 (*)     pO2, Giorgio 114.1 (*)     HCO3, Venous 32.4 (*)     Base Excess, Giorgio 6.4 (*)     All other components within normal limits   COVID-19, RAPID   RAPID INFLUENZA A/B ANTIGENS   CULTURE, BLOOD 1   CULTURE, BLOOD 2   CBC WITH AUTO DIFFERENTIAL   LACTATE, SEPSIS   APTT   LACTATE, SEPSIS       MEDICAL DECISION MAKING        Patient was placed on nonrebreather because her O2 sats were in the 70s without the supplemental oxygen. EMS reports that she was in the 80% range when they found her on room air. EMS provided breathing treatment in route which was minimally helpful. My evaluation is that the patient appears to be in congestive heart failure and for this reason we gave her Lasix and aspirin and nitro. Heparin drip was ordered after reviewing the positive troponin and despite the patient's lack of chest pain I do believe this is likely a non-ST elevation MI.   The total Critical Care time is 90 minutes which excludes separately billable procedures. The critical care was concerning treatment of CHF and hypoxic respiratory failure with supplemental oxygen, IV Lasix and aspirin and nitro. This time is exclusive of any time documented by any other providers. The admitting hospitalist inform me that he plans to evaluate the patient in the ER at [facility] to confirm that she is stable for their PCU level. I spoke with the ER doctor, Dr. Harpreet Ramesh, and he is aware of and agreeable with this plan. I spoke with Dr. Austyn Swan. We thoroughly discussed the history, physical exam, laboratory and imaging studies, as well as, emergency department course. Based upon that discussion, we've decided to admit Francine Marina for further observation and evaluation of Fernie Almanzar's dyspnea. As I have deemed necessary from their history, physical, and studies, I have considered and evaluated Francine Marina for the following diagnoses:  ACUTE CORONARY SYNDROME, CHRONIC OBSTRUCTIVE PULMONARY DISEASE, CONGESTIVE HEART FAILURE, PERICARDIAL TAMPONADE, PNEUMONIA, PNEUMOTHORAX, PULMONARY EMBOLISM, SEPSIS, and THORACIC DISSECTION. FINAL IMPRESSION  1. Congestive heart failure, unspecified HF chronicity, unspecified heart failure type ([address]    2. Acute hypoxemic respiratory failure (HCC)    3. Pulmonary edema with congestive heart failure ([address]    4. NSTEMI (non-ST elevated myocardial infarction) ([address]    5. Pulmonary nodule        Vitals:  Weight 187 lb (84.8 kg), not currently breastfeeding. Disposition  Pt is in stable condition upon Admit to telemetry. This chart was generated using the [address] [place] [address] dictation system. I created this record but it may contain dictation errors.           Dmitri Hathaway MD  11/06/22 5653

## 2022-11-06 NOTE — H&P
Hospital Medicine History & Physical      PCP: Gertrudis Carrasco MD    Date of Admission: 11/6/2022    Date of Service: Pt seen/examined on 11/6/2022 and admitted to inpatient    Chief Complaint: Present worsening shortness of breath both on exertion and at rest, acute onset worsening shortness of breath tonight with cough and sputum      History Of Present Illness: The patient is a 71 y.o. female with past ministry of noted COPD and continues to smoke as well as hypertension and hyperlipidemia but no previous history of heart attacks/strokes/heart failure who presents to [facility] as a transfer from neighboring ED after initially presenting to that ED for concern of acute onset worsening shortness of breath over the course of the last 24 hours but she does note that she has had progressive intermittent shortness of breath and what sounds like dyspnea on exertion mainly over the last month. Over the last 3 to 4 days she noted that her especially her shortness of breath became more concerning since it was occurring also at rest and she was having increasing cough and sputum production the whole last 3 days. She notes however that the progressive shortness of breath on exertion was occurring and she was apparently going to see an outpatient provider about this but had not done so yet. Apart from the acute worsening shortness of breath with cough and sputum production and the dyspnea exertion over the last month, she denies other recent symptoms of fever, chills, dizziness, syncope, chest pain, dysuria, blood in urine/stool/sputum, nausea/vomiting/diarrhea/abdominal pain, leg swelling, rashes.     Past Medical History:        Diagnosis Date    COPD, mild ([address]     Depression     Hypercholesteremia     Hypertension        Past Surgical History:        Procedure Laterality Date    APPENDECTOMY       SECTION      CHOLECYSTECTOMY      HYSTERECTOMY (CERVIX STATUS UNKNOWN)      KIDNEY REMOVAL         Medications Prior to Admission:    Prior to Admission medications    Medication Sig Start Date End Date Taking? Authorizing Provider   methylPREDNISolone (MEDROL, TREVOR,) 4 MG tablet Take by mouth. Patient not taking: Reported on 2022 3/13/21   Silverio Easley MD   OXYGEN Inhale into the lungs Patient uses as needed. She doesn't know how much she uses.   Patient not taking: Reported on 2022    Historical Provider, MD   albuterol sulfate  (90 Base) MCG/ACT inhaler Inhale 2 puffs into the lungs 4 times daily as needed for Wheezing 20   Eric Cabrera MD   albuterol (PROVENTIL) (5 MG/ML) 0.5% nebulizer solution Take 0.5 mLs by nebulization every 6 hours as needed for Wheezing  Patient not taking: Reported on 2022   Eric Cabrera MD   Cholecalciferol (VITAMIN D3) 75197 units CAPS TAKE ONE CAPSULE BY MOUTH ONCE A WEEK  Patient not taking: Reported on 2022   SAULO Lucio - CNP   Roflumilast (DALIRESP) 500 MCG tablet Take 500 mcg by mouth daily 18   Sailaja Macias MD   Fluticasone Furoate-Vilanterol (BREO ELLIPTA IN) Inhale into the lungs    Historical Provider, MD   Umeclidinium Bromide (INCRUSE ELLIPTA IN) Inhale into the lungs  Patient not taking: Reported on 2022    Historical Provider, MD   amLODIPine (NORVASC) 5 MG tablet Take 5 mg by mouth daily    Historical Provider, MD   Nicotine (Yelena Peterson TD) Place onto the skin  Patient not taking: Reported on 2022    Historical Provider, MD   vitamin D (CHOLECALCIFEROL) 1000 UNIT TABS tablet Take 1 tablet by mouth daily  Patient not taking: Reported on 2022 3/28/17   Mary Grace Nuñez MD   Multiple Vitamins-Minerals (CENTROVITE) TABS Take 1 tablet by mouth daily  Patient not taking: Reported on 2022   Historical Provider, MD   Calcium Carbonate-Vitamin D 500-400 MG-UNIT TABS Take 1 tablet by mouth daily  Patient not taking: Reported on 11/6/2022 3/2/17   Historical Provider, MD   acetaminophen (APAP EXTRA STRENGTH) 500 MG tablet Take 2 tablets by mouth every 6 hours as needed for Pain  Patient not taking: Reported on 11/6/2022 4/7/16   SAULO Atwood - CNP   atorvastatin (LIPITOR) 40 MG tablet Take 40 mg by mouth daily    Historical Provider, MD       Allergies:  Patient has no known allergies. Social History:  The patient currently lives home    TOBACCO:   reports that she has been smoking cigarettes. She has a 12.50 pack-year smoking history. She has never used smokeless tobacco.  ETOH:   reports that she does not currently use alcohol. Family History:  Reviewed in detail and negative for DM, Early CAD, Cancer, CVA. Positive as follows:        Problem Relation Age of Onset    Cancer Mother         brain and throat    Heart Attack Father        REVIEW OF SYSTEMS:   and as noted in the HPI. All other systems reviewed and negative. PHYSICAL EXAM:    /62   Pulse 93   Temp 98.1 °F (36.7 °C) (Oral)   Resp 16   Ht 5' 3\" (1.6 m)   Wt 186 lb 15.2 oz (84.8 kg)   SpO2 93%   BMI 33.12 kg/m²     General appearance: Fatigued appearing, slightly anxious appearing, on nasal cannula oxygen, feels somewhat less short of breath but still coughing a little bit, chronically ill-appearing  HEENT Normal cephalic, atraumatic without obvious deformity. Pupils equal, round, and reactive to light. Extra ocular muscles intact.   Moist mucous membranes, anicteric sclera  Neck: Supple, no JVD  Lungs: Diminished breath sounds with end expiratory wheezing throughout multiple lung fields bilaterally, also some mild basilar crackles noted  Heart: Currently seems regular rate and rhythm, no murmurs detected at this time although difficult to see lung sounds  Abdomen: Soft, nontender, nondistended, active bowel  Extremities: Trace bilateral lower extremity pitting edema symmetrically  Skin: No rashes  Neurologic: Grossly intact neurologically  Mental status: Alert, oriented, thought content appropriate. Capillary Refill: Acceptable  < 3 seconds  Peripheral Pulses: +3 Easily felt, not easily obliterated with pressure    11/06/22 0125  XR CHEST PORTABLE   Performed: 11/06/22 0102  Final        Impression: Mild cardiomegaly and mild pulmonary edema. 1.5 cm slightly dense nodule right upper lobe which is unchanged             CBC   Recent Labs     11/06/22 0100 11/06/22 0537   WBC 8.4 7.0   HGB 13.3 12.1   HCT 38.6 36.4    198      RENAL  Recent Labs     11/06/22 0100 11/06/22 0537    140   K 4.4 4.3    102   CO2 25 26   PHOS  --  4.6   BUN 19 23*   CREATININE 1.1 1.2     LFT'S  Recent Labs     11/06/22 0100   AST 21   ALT 19   BILITOT 0.3   ALKPHOS 192*     COAG  No results for input(s): INR in the last 72 hours.   CARDIAC ENZYMES  Recent Labs     11/06/22 0100 11/06/22 0537   TROPONINI 0.08* 0.07*       U/A:    Lab Results   Component Value Date/Time    COLORU Yellow 08/05/2022 08:25 AM    CLARITYU Clear 08/05/2022 08:25 AM    SPECGRAV <=1.005 08/05/2022 08:25 AM    LEUKOCYTESUR Negative 08/05/2022 08:25 AM    BLOODU Negative 08/05/2022 08:25 AM    GLUCOSEU Negative 08/05/2022 08:25 AM       ABG    Lab Results   Component Value Date/Time    VMM1XNM 36.4 10/14/2015 08:56 AM    BEART 13 10/14/2015 08:56 AM    S0EVUCWY 95 10/14/2015 08:56 AM    PHART 7.476 10/14/2015 08:56 AM    ATK8GLN 49 10/14/2015 08:56 AM    PO2ART 71 10/14/2015 08:56 AM    BJR1TVV 38 10/14/2015 08:56 AM           Active Hospital Problems    Diagnosis Date Noted    Acute CHF (congestive heart failure) ([facility] [I50.9] 11/06/2022     Priority: Medium    NSTEMI (non-ST elevated myocardial infarction) ([facility] [I21.4] 11/06/2022     Priority: Medium    Acute pulmonary edema ([facility] [J81.0] 10/10/2015    Acute respiratory failure with hypoxia (HCC) [J96.01] 10/10/2015    COPD exacerbation ([facility] [J44.1] 10/10/2015         PHYSICIANS CERTIFICATION:    I certify that Dank Lane is expected to be hospitalized for greater than 2 midnights based on the following assessment and plan:      ASSESSMENT/PLAN:  Acute CHF  NSTEMI  COPD exacerbation  Acute respiratory failure with hypoxia    Plan:  Suspect component of COPD exacerbation with acute pulmonary edema suggestive of new onset CHF with some component of NSTEMI related to both of these components  Patient started heparin IV infusion for NSTEMI, will continue  Patient given 1 dose of IV Lasix 40 in the ED, continue 40 twice daily of IV Lasix  Start patient on COPD exacerbation therapy with Zithromax, Solu-Medrol 40 every 6, guaifenesin, and DuoNebs  Obtain transthoracic echo in the morning  Restart patient's home medications  Start patient on aspirin and statin  Cardiology consult for NSTEMI in pulmonary edema  N.p.o. this morning  Trend troponins, repeat labs daily        DVT Prophylaxis: Heparin IV infusion  Diet: Diet NPO Exceptions are: Ice Chips, Sips of Water with Meds  Code Status: Full Code  PT/OT Eval Status: Ambulatory    Dispo -pending clinical course       Raymond Adame, DO    Thank you Ailyn Shirley MD for the opportunity to be involved in this patient's care. If you have any questions or concerns please feel free to contact me at [phone].

## 2022-11-06 NOTE — PLAN OF CARE
Problem: Discharge Planning  Goal: Discharge to home or other facility with appropriate resources  11/6/2022 0858 by Isamar Leach RN  Outcome: Progressing  11/6/2022 0637 by Francesco Lawton RN  Outcome: Progressing     Problem: Skin/Tissue Integrity  Goal: Absence of new skin breakdown  Description: 1. Monitor for areas of redness and/or skin breakdown  2. Assess vascular access sites hourly  3. Every 4-6 hours minimum:  Change oxygen saturation probe site  4. Every 4-6 hours:  If on nasal continuous positive airway pressure, respiratory therapy assess nares and determine need for appliance change or resting period.   11/6/2022 0858 by Isamar Leach RN  Outcome: Progressing  11/6/2022 0637 by Francesco Lawton RN  Outcome: Progressing     Problem: ABCDS Injury Assessment  Goal: Absence of physical injury  11/6/2022 0858 by Isamar Leach RN  Outcome: Progressing  11/6/2022 0637 by Francesco Lawton RN  Outcome: Progressing     Problem: Safety - Adult  Goal: Free from fall injury  11/6/2022 0858 by Isamar Leach RN  Outcome: Progressing  11/6/2022 0637 by Francesco Lawton RN  Outcome: Progressing     Problem: Chronic Conditions and Co-morbidities  Goal: Patient's chronic conditions and co-morbidity symptoms are monitored and maintained or improved  11/6/2022 0858 by Isamar Leach RN  Outcome: Progressing  11/6/2022 0637 by Francesco Lawton RN  Outcome: Progressing

## 2022-11-06 NOTE — PROGRESS NOTES
3 attempted phone calls placed to below contact, no answer and unable to leave [address] Yes      Primary Phone: [phone] (M          3 Attempted phone calls to below contact, no answer, voicemail was for an insurance company, did not leave message   Darlyn Riley Other  Attach Yes      Legal Guardian?:      Primary Phone: [phone] (M)

## 2022-11-06 NOTE — CONSULTS
[facility]  Cardiology Consult Note        CC:       Shortness of breath /elevated troponins             HPI:   This is a 71 y.o. female who comes to the hospital with several days history of shortness of breath cough inability to lay flat. She has COPD and is on oxygen at home. Admission proBNP is 8318. EKG is normal creatinine is normal and hemoglobin is normal      Teri Eng is a 71 y.o. female who presents to the ED for shortness of breath. Patient reports 4 days of worsening shortness of breath that became severe tonight. Her neighbor called EMS on her behalf. She denies any chest pain at any time throughout this. She denies also any fevers, chills or sweats. No vomiting or diarrhea. No significant URI symptoms. She denies also any history of heart failure. She states she has history of COPD and used to be on oxygen but is no longer.     Home medicines include atorvastatin amlodipine home oxygen Medrol      Past Medical History:   Diagnosis Date    COPD, mild ([address]     Depression     Hypercholesteremia     Hypertension       Past Surgical History:   Procedure Laterality Date    APPENDECTOMY       SECTION      CHOLECYSTECTOMY      HYSTERECTOMY (CERVIX STATUS UNKNOWN)      KIDNEY REMOVAL        Family History   Problem Relation Age of Onset    Cancer Mother         brain and throat    Heart Attack Father       Social History     Tobacco Use    Smoking status: Every Day     Packs/day: 0.25     Years: 50.00     Pack years: 12.50     Types: Cigarettes    Smokeless tobacco: Never   Vaping Use    Vaping Use: Never used   Substance Use Topics    Alcohol use: Not Currently     Comment: weekly    Drug use: No      No Known Allergies   Roflumilast  500 mcg Oral Daily    mometasone-formoterol  2 puff Inhalation BID    tiotropium  2 puff Inhalation Daily    sodium chloride flush  5-40 mL IntraVENous 2 times per day    [START ON 2022] aspirin  81 mg Oral Daily    atorvastatin  80 mg Oral Nightly    furosemide  40 mg IntraVENous BID    azithromycin  500 mg IntraVENous Q24H    methylPREDNISolone  40 mg IntraVENous Q6H    miconazole   Topical BID       Review of Systems -   Constitutional: Negative for weight gain/loss; malaise, fever  Respiratory: Negative for Asthma;  cough and hemoptysis  Cardiovascular: Negative for palpitations,dizziness   Gastrointestinal: Negative for abd.pain; constipation/diarrhea;    Genitourinary: Negative for stones; hematuria; frequency hesitancy  Integumentt: Negative for rash or pruritis  Hematologic/lymphatic: Negative for blood dyscrasia; leukemia/lymphoma  Musculoskeletal: Negative for Connective tissue disease  Neurological:  Negative for Seizure   Behavioral/Psych:Negative for Bipolar disorder, Schizophrenia; Dementia  Endocrine: negative for thyroid, parathyroid disease    No intake or output data in the 24 hours ending 11/06/22 0934    Physical Examination:    /62   Pulse 93   Temp 98.1 °F (36.7 °C) (Oral)   Resp 16   Ht 5' 3\" (1.6 m)   Wt 186 lb 15.2 oz (84.8 kg)   SpO2 93%   BMI 33.12 kg/m²    HEENT:  Face: Atraumatic, Conjunctiva: Pink; non icteric,  Mucous Memb:  Moist, No thyromegaly or Lymphadenopathy  Respiratory:  Resp Assessment: normal, Resp Auscultation: clear   Cardiovascular: Auscultation: nl S1 & S2, Palpation:  Nl PMI;  No heaves or thrills, JVP:  normal  Abdomen: Soft, non-tender, Normal bowel sounds,  No organomegaly  Extremities: No Cyanosis or Clubbing; Edema none  Neurological: Oriented to time, place, and person, Non-anxious  Psychiatric: Normal mood and affect  Skin: Warm and dry,  No rash seen      Current Facility-Administered Medications: heparin 25,000 unit in sodium chloride 0.45% 250 mL (premix) infusion, 0-4,000 Units/hr, IntraVENous, Continuous  Roflumilast (DALIRESP) tablet 500 mcg, 500 mcg, Oral, Daily  mometasone-formoterol (DULERA) 200-5 MCG/ACT inhaler 2 puff, 2 puff, Inhalation, BID  tiotropium (SPIRIVA RESPIMAT) 2.5 MCG/ACT inhaler 2 puff, 2 puff, Inhalation, Daily  sodium chloride flush 0.9 % injection 5-40 mL, 5-40 mL, IntraVENous, 2 times per day  sodium chloride flush 0.9 % injection 5-40 mL, 5-40 mL, IntraVENous, PRN  0.9 % sodium chloride infusion, , IntraVENous, PRN  acetaminophen (TYLENOL) tablet 650 mg, 650 mg, Oral, Q6H PRN **OR** acetaminophen (TYLENOL) suppository 650 mg, 650 mg, Rectal, Q6H PRN  [START ON 11/7/2022] aspirin chewable tablet 81 mg, 81 mg, Oral, Daily  atorvastatin (LIPITOR) tablet 80 mg, 80 mg, Oral, Nightly  perflutren lipid microspheres (DEFINITY) injection 1.5 mL, 1.5 mL, IntraVENous, ONCE PRN  ondansetron (ZOFRAN) injection 4 mg, 4 mg, IntraVENous, Q6H PRN  furosemide (LASIX) injection 40 mg, 40 mg, IntraVENous, BID  guaiFENesin-dextromethorphan (ROBITUSSIN DM) 100-10 MG/5ML syrup 5 mL, 5 mL, Oral, Q4H PRN  azithromycin (ZITHROMAX) 500 mg in D5W 250ml addavial, 500 mg, IntraVENous, Q24H  methylPREDNISolone sodium (SOLU-MEDROL) injection 40 mg, 40 mg, IntraVENous, Q6H  miconazole (MICOTIN) 2 % powder, , Topical, BID  ipratropium-albuterol (DUONEB) nebulizer solution 1 ampule, 1 ampule, Inhalation, Q4H PRN      Labs:   Recent Labs     11/06/22 0100 11/06/22  0537   WBC 8.4 7.0   HGB 13.3 12.1   HCT 38.6 36.4    198     Recent Labs     11/06/22 0100 11/06/22  0537    140   K 4.4 4.3   CO2 25 26   BUN 19 23*   CREATININE 1.1 1.2   GLUCOSE 177* 166*     No results for input(s): BNP in the last 72 hours. No results for input(s): PROTIME, INR in the last 72 hours. Recent Labs     11/06/22 0100   APTT 26.8     Recent Labs     11/06/22 0100 11/06/22  0537   TROPONINI 0.08* 0.07*     Lab Results   Component Value Date/Time    TRIG 230 10/14/2015 04:10 AM     Recent Labs     11/06/22  0100 11/06/22  0537   AST 21  --    ALT 19  --    LABALBU 4.2 4.1         EKG:   Sinus tach RBBB rate 103 No ischemic changes    Chest x-ray :cardiomegaly and mild pulmonary edema.       ECHO: 10/21  Ejection fraction is visually estimated to be 60-65%. No regional wall motion abnormalities are noted. Grade I diastolic dysfunction with elevated LV filling pressures. Moderate aortic stenosis with a peak velocity of 298m/s and a max/mean pressure gradient of 20/36 mmHg. The right ventricle is normal in size and function. ASSESSMENT AND PLAN:      Shortness of breath  Shortness of breath orthopnea coughing for the last several weeks   proBNP is elevated and greater than 8000 suggestive of CHF on top of COPD   Will check echo tomorrow  Agree with using IV Lasix    Troponin elevation  No chest pain  0.07-0.08  ? Nstemi  Agree Heparin and aspirin  Will need work-up to rule out coronary artery disease/ischemia      Gladys ANDRADE  11/6/2022

## 2022-11-06 NOTE — PROGRESS NOTES
Order for PICC placement by MD Ariel Breen. DIT called and RN Tamir Mueller spoke to representative at [phone]. Pts EGFR is 48. MD Ariel Breen okay with pt getting picc line. Consent filled out by MD Ariel Breen as emergent.

## 2022-11-06 NOTE — PROGRESS NOTES
INTUBATION RT: Responded to Code [employer];  Intubated pt who was being ventilated by ambu bag; started to try to intubate during compressions pt was \"bouncing\" quite a bit; so decided to bag until pulse check ; then intubated without complication visualized cords; color change on colorimeter; 7.5 tube with 23 at lips ; tube was secured with commercially approved tube go (marty) Electronically signed by Nacho Azul RCP on 11/6/2022 at 4:55 PM

## 2022-11-06 NOTE — PROGRESS NOTES
Patient continues to yell out complaining of anxiety then apologizing to staff, restless and anxious 1:1 given with short term effect easily redirected but for only short time, continues to strip off clothes and bedding removing telemetry and purewick patient redirected, dressed and bed changed at this time patient resting PRN given as ordered tm

## 2022-11-07 LAB
ALBUMIN SERPL-MCNC: 3.7 G/DL (ref 3.4–5)
ANION GAP SERPL CALCULATED.3IONS-SCNC: 19 MMOL/L (ref 3–16)
ANTI-XA UNFRAC HEPARIN: 0.24 IU/ML (ref 0.3–0.7)
APTT: 142.7 SEC (ref 23–34.3)
APTT: 42.5 SEC (ref 23–34.3)
APTT: 58.4 SEC (ref 23–34.3)
BASE EXCESS ARTERIAL: -1 MMOL/L (ref -3–3)
BASE EXCESS VENOUS: 2 (ref -3–3)
BASOPHILS ABSOLUTE: 0 K/UL (ref 0–0.2)
BASOPHILS RELATIVE PERCENT: 0.1 %
BUN BLDV-MCNC: 51 MG/DL (ref 7–20)
CALCIUM SERPL-MCNC: 8.6 MG/DL (ref 8.3–10.6)
CARBOXYHEMOGLOBIN ARTERIAL: 1.2 % (ref 0–1.5)
CHLORIDE BLD-SCNC: 98 MMOL/L (ref 99–110)
CO2: 23 MMOL/L (ref 21–32)
CREAT SERPL-MCNC: 2.5 MG/DL (ref 0.6–1.2)
EKG ATRIAL RATE: 79 BPM
EKG DIAGNOSIS: NORMAL
EKG P AXIS: 30 DEGREES
EKG P-R INTERVAL: 128 MS
EKG Q-T INTERVAL: 454 MS
EKG QRS DURATION: 134 MS
EKG QTC CALCULATION (BAZETT): 520 MS
EKG R AXIS: 67 DEGREES
EKG T AXIS: 30 DEGREES
EKG VENTRICULAR RATE: 79 BPM
EOSINOPHILS ABSOLUTE: 0 K/UL (ref 0–0.6)
EOSINOPHILS RELATIVE PERCENT: 0 %
GFR SERPL CREATININE-BSD FRML MDRD: 20 ML/MIN/{1.73_M2}
GLUCOSE BLD-MCNC: 110 MG/DL (ref 70–99)
GLUCOSE BLD-MCNC: 112 MG/DL (ref 70–99)
GLUCOSE BLD-MCNC: 120 MG/DL (ref 70–99)
GLUCOSE BLD-MCNC: 139 MG/DL (ref 70–99)
HCO3 ARTERIAL: 26.9 MMOL/L (ref 21–29)
HCO3 VENOUS: 28.7 MMOL/L (ref 23–29)
HCT VFR BLD CALC: 33.8 % (ref 36–48)
HEMOGLOBIN, ART, EXTENDED: 12.1 G/DL (ref 12–16)
HEMOGLOBIN: 11.5 G/DL (ref 12–16)
LV EF: 60 %
LVEF MODALITY: NORMAL
LYMPHOCYTES ABSOLUTE: 0.4 K/UL (ref 1–5.1)
LYMPHOCYTES RELATIVE PERCENT: 4 %
MAGNESIUM: 2 MG/DL (ref 1.8–2.4)
MCH RBC QN AUTO: 31.4 PG (ref 26–34)
MCHC RBC AUTO-ENTMCNC: 33.9 G/DL (ref 31–36)
MCV RBC AUTO: 92.6 FL (ref 80–100)
METHEMOGLOBIN ARTERIAL: 0.2 %
MONOCYTES ABSOLUTE: 0.6 K/UL (ref 0–1.3)
MONOCYTES RELATIVE PERCENT: 5.4 %
NEUTROPHILS ABSOLUTE: 9.8 K/UL (ref 1.7–7.7)
NEUTROPHILS RELATIVE PERCENT: 90.5 %
O2 SAT, ARTERIAL: 93.2 %
O2 SAT, VEN: 82 %
O2 THERAPY: ABNORMAL
PCO2 ARTERIAL: 58.9 MMHG (ref 35–45)
PCO2, VEN: 64.5 MM HG (ref 40–50)
PDW BLD-RTO: 13.7 % (ref 12.4–15.4)
PERFORMED ON: ABNORMAL
PH ARTERIAL: 7.27 (ref 7.35–7.45)
PH VENOUS: 7.26 (ref 7.35–7.45)
PHOSPHORUS: 4 MG/DL (ref 2.5–4.9)
PLATELET # BLD: 203 K/UL (ref 135–450)
PMV BLD AUTO: 7.9 FL (ref 5–10.5)
PO2 ARTERIAL: 73.4 MMHG (ref 75–108)
PO2, VEN: 55 MM HG
POC SAMPLE TYPE: ABNORMAL
POTASSIUM SERPL-SCNC: 4.3 MMOL/L (ref 3.5–5.1)
RBC # BLD: 3.65 M/UL (ref 4–5.2)
SODIUM BLD-SCNC: 140 MMOL/L (ref 136–145)
TCO2 ARTERIAL: 28.7 MMOL/L
TCO2 CALC VENOUS: 31 MMOL/L
WBC # BLD: 10.8 K/UL (ref 4–11)

## 2022-11-07 PROCEDURE — 94003 VENT MGMT INPAT SUBQ DAY: CPT

## 2022-11-07 PROCEDURE — 94640 AIRWAY INHALATION TREATMENT: CPT

## 2022-11-07 PROCEDURE — 6360000002 HC RX W HCPCS: Performed by: STUDENT IN AN ORGANIZED HEALTH CARE EDUCATION/TRAINING PROGRAM

## 2022-11-07 PROCEDURE — 99233 SBSQ HOSP IP/OBS HIGH 50: CPT | Performed by: INTERNAL MEDICINE

## 2022-11-07 PROCEDURE — 93970 EXTREMITY STUDY: CPT

## 2022-11-07 PROCEDURE — 82803 BLOOD GASES ANY COMBINATION: CPT

## 2022-11-07 PROCEDURE — 2000000000 HC ICU R&B

## 2022-11-07 PROCEDURE — 99291 CRITICAL CARE FIRST HOUR: CPT | Performed by: INTERNAL MEDICINE

## 2022-11-07 PROCEDURE — 2700000000 HC OXYGEN THERAPY PER DAY

## 2022-11-07 PROCEDURE — 6360000002 HC RX W HCPCS: Performed by: NURSE PRACTITIONER

## 2022-11-07 PROCEDURE — 6360000002 HC RX W HCPCS: Performed by: INTERNAL MEDICINE

## 2022-11-07 PROCEDURE — 85025 COMPLETE CBC W/AUTO DIFF WBC: CPT

## 2022-11-07 PROCEDURE — 5A12012 PERFORMANCE OF CARDIAC OUTPUT, SINGLE, MANUAL: ICD-10-PCS | Performed by: INTERNAL MEDICINE

## 2022-11-07 PROCEDURE — 6370000000 HC RX 637 (ALT 250 FOR IP): Performed by: NURSE PRACTITIONER

## 2022-11-07 PROCEDURE — 2580000003 HC RX 258: Performed by: NURSE PRACTITIONER

## 2022-11-07 PROCEDURE — C8929 TTE W OR WO FOL WCON,DOPPLER: HCPCS

## 2022-11-07 PROCEDURE — 85520 HEPARIN ASSAY: CPT

## 2022-11-07 PROCEDURE — 85730 THROMBOPLASTIN TIME PARTIAL: CPT

## 2022-11-07 PROCEDURE — 80069 RENAL FUNCTION PANEL: CPT

## 2022-11-07 PROCEDURE — 36415 COLL VENOUS BLD VENIPUNCTURE: CPT

## 2022-11-07 PROCEDURE — APPNB15 APP NON BILLABLE TIME 0-15 MINS: Performed by: NURSE PRACTITIONER

## 2022-11-07 PROCEDURE — 2500000003 HC RX 250 WO HCPCS: Performed by: NURSE PRACTITIONER

## 2022-11-07 PROCEDURE — 93010 ELECTROCARDIOGRAM REPORT: CPT | Performed by: INTERNAL MEDICINE

## 2022-11-07 PROCEDURE — 2580000003 HC RX 258: Performed by: STUDENT IN AN ORGANIZED HEALTH CARE EDUCATION/TRAINING PROGRAM

## 2022-11-07 PROCEDURE — 94760 N-INVAS EAR/PLS OXIMETRY 1: CPT

## 2022-11-07 PROCEDURE — A4216 STERILE WATER/SALINE, 10 ML: HCPCS | Performed by: NURSE PRACTITIONER

## 2022-11-07 PROCEDURE — 83735 ASSAY OF MAGNESIUM: CPT

## 2022-11-07 PROCEDURE — 93005 ELECTROCARDIOGRAM TRACING: CPT | Performed by: STUDENT IN AN ORGANIZED HEALTH CARE EDUCATION/TRAINING PROGRAM

## 2022-11-07 RX ORDER — HEPARIN SODIUM 1000 [USP'U]/ML
6700 INJECTION, SOLUTION INTRAVENOUS; SUBCUTANEOUS ONCE
Status: COMPLETED | OUTPATIENT
Start: 2022-11-07 | End: 2022-11-07

## 2022-11-07 RX ORDER — HEPARIN SODIUM 1000 [USP'U]/ML
6800 INJECTION, SOLUTION INTRAVENOUS; SUBCUTANEOUS ONCE
Status: DISCONTINUED | OUTPATIENT
Start: 2022-11-07 | End: 2022-11-07

## 2022-11-07 RX ORDER — FUROSEMIDE 10 MG/ML
80 INJECTION INTRAMUSCULAR; INTRAVENOUS ONCE
Status: COMPLETED | OUTPATIENT
Start: 2022-11-07 | End: 2022-11-07

## 2022-11-07 RX ORDER — MIDAZOLAM HYDROCHLORIDE 1 MG/ML
1 INJECTION INTRAMUSCULAR; INTRAVENOUS
Status: DISCONTINUED | OUTPATIENT
Start: 2022-11-07 | End: 2022-11-08

## 2022-11-07 RX ORDER — CHLORHEXIDINE GLUCONATE 0.12 MG/ML
15 RINSE ORAL 2 TIMES DAILY
Status: DISCONTINUED | OUTPATIENT
Start: 2022-11-07 | End: 2022-11-08

## 2022-11-07 RX ORDER — FENTANYL CITRATE 50 UG/ML
50 INJECTION, SOLUTION INTRAMUSCULAR; INTRAVENOUS
Status: DISCONTINUED | OUTPATIENT
Start: 2022-11-07 | End: 2022-11-08

## 2022-11-07 RX ORDER — METHYLPREDNISOLONE SODIUM SUCCINATE 40 MG/ML
40 INJECTION, POWDER, LYOPHILIZED, FOR SOLUTION INTRAMUSCULAR; INTRAVENOUS DAILY
Status: DISCONTINUED | OUTPATIENT
Start: 2022-11-08 | End: 2022-11-11

## 2022-11-07 RX ADMIN — MOMETASONE FUROATE AND FORMOTEROL FUMARATE DIHYDRATE 2 PUFF: 200; 5 AEROSOL RESPIRATORY (INHALATION) at 08:17

## 2022-11-07 RX ADMIN — HEPARIN SODIUM 6700 UNITS: 1000 INJECTION INTRAVENOUS; SUBCUTANEOUS at 03:12

## 2022-11-07 RX ADMIN — HEPARIN SODIUM 6700 UNITS: 1000 INJECTION INTRAVENOUS; SUBCUTANEOUS at 21:27

## 2022-11-07 RX ADMIN — MICONAZOLE NITRATE: 2 POWDER TOPICAL at 20:09

## 2022-11-07 RX ADMIN — SODIUM CHLORIDE, PRESERVATIVE FREE 10 ML: 5 INJECTION INTRAVENOUS at 20:09

## 2022-11-07 RX ADMIN — FENTANYL CITRATE 50 MCG: 50 INJECTION, SOLUTION INTRAMUSCULAR; INTRAVENOUS at 22:29

## 2022-11-07 RX ADMIN — Medication 20 MG: at 14:05

## 2022-11-07 RX ADMIN — METHYLPREDNISOLONE SODIUM SUCCINATE 40 MG: 40 INJECTION, POWDER, FOR SOLUTION INTRAMUSCULAR; INTRAVENOUS at 04:39

## 2022-11-07 RX ADMIN — CHLORHEXIDINE GLUCONATE 0.12% ORAL RINSE 15 ML: 1.2 LIQUID ORAL at 20:08

## 2022-11-07 RX ADMIN — PROPOFOL 20 MCG/KG/MIN: 10 INJECTION, EMULSION INTRAVENOUS at 02:28

## 2022-11-07 RX ADMIN — MIDAZOLAM HYDROCHLORIDE 1 MG: 1 INJECTION, SOLUTION INTRAMUSCULAR; INTRAVENOUS at 18:19

## 2022-11-07 RX ADMIN — MICONAZOLE NITRATE: 2 POWDER TOPICAL at 09:15

## 2022-11-07 RX ADMIN — CHLORHEXIDINE GLUCONATE 0.12% ORAL RINSE 15 ML: 1.2 LIQUID ORAL at 14:05

## 2022-11-07 RX ADMIN — MOMETASONE FUROATE AND FORMOTEROL FUMARATE DIHYDRATE 2 PUFF: 200; 5 AEROSOL RESPIRATORY (INHALATION) at 20:16

## 2022-11-07 RX ADMIN — AZITHROMYCIN DIHYDRATE 500 MG: 500 INJECTION, POWDER, LYOPHILIZED, FOR SOLUTION INTRAVENOUS at 04:39

## 2022-11-07 RX ADMIN — FUROSEMIDE 80 MG: 10 INJECTION, SOLUTION INTRAMUSCULAR; INTRAVENOUS at 16:43

## 2022-11-07 ASSESSMENT — PULMONARY FUNCTION TESTS
PIF_VALUE: 27
PIF_VALUE: 33
PIF_VALUE: 21
PIF_VALUE: 22
PIF_VALUE: 24
PIF_VALUE: 24
PIF_VALUE: 28
PIF_VALUE: 24
PIF_VALUE: 25
PIF_VALUE: 26
PIF_VALUE: 6
PIF_VALUE: 26
PIF_VALUE: 22
PIF_VALUE: 12
PIF_VALUE: 24
PIF_VALUE: 26
PIF_VALUE: 27
PIF_VALUE: 26
PIF_VALUE: 23
PIF_VALUE: 27
PIF_VALUE: 26
PIF_VALUE: 23
PIF_VALUE: 25
PIF_VALUE: 27
PIF_VALUE: 11
PIF_VALUE: 24
PIF_VALUE: 27
PIF_VALUE: 25
PIF_VALUE: 26
PIF_VALUE: 25
PIF_VALUE: 26
PIF_VALUE: 26
PIF_VALUE: 23
PIF_VALUE: 25
PIF_VALUE: 27
PIF_VALUE: 11
PIF_VALUE: 40
PIF_VALUE: 6
PIF_VALUE: 25
PIF_VALUE: 29
PIF_VALUE: 27
PIF_VALUE: 23
PIF_VALUE: 23
PIF_VALUE: 22
PIF_VALUE: 11
PIF_VALUE: 25
PIF_VALUE: 23
PIF_VALUE: 21
PIF_VALUE: 23
PIF_VALUE: 26
PIF_VALUE: 26
PIF_VALUE: 33
PIF_VALUE: 25
PIF_VALUE: 20
PIF_VALUE: 43
PIF_VALUE: 25
PIF_VALUE: 25
PIF_VALUE: 26
PIF_VALUE: 23
PIF_VALUE: 26
PIF_VALUE: 26
PIF_VALUE: 24

## 2022-11-07 ASSESSMENT — PAIN SCALES - GENERAL
PAINLEVEL_OUTOF10: 0

## 2022-11-07 NOTE — PROGRESS NOTES
Clinical Pharmacy Note  Heparin Dosing       Lab Results   Component Value Date/Time    APTT 42.5 11/07/2022 02:01 AM     Lab Results   Component Value Date/Time    HGB 13.4 11/06/2022 04:47 PM    HCT 40.9 11/06/2022 04:47 PM     11/06/2022 04:47 PM    INR 1.06 11/06/2022 04:48 PM       Current Infusion Rate: 1090 units/hr    Plan:  Bolus: 6700 units  Rate: increase to 1430 units/hr  Next aPTT: 1000 11/7/22    Pharmacy will continue to monitor and adjust based on aPTT results.   Colleen Chavira, PharmD

## 2022-11-07 NOTE — PROGRESS NOTES
Arrived to place PICC line in patient with, Romina De La Rosa RN at bedside, pre procedure and allergies reviewed, no issues accessing brachial vein, pt tolerated well, blood return and flushed well, tip verified with 3cg technology with peaked p-waves, OK to use PICC. Pt left in stable condition and bed braked and in lowest position. Pt call light within reach.  Handoff to RN

## 2022-11-07 NOTE — PROGRESS NOTES
Progress Note  Admit Date: 11/6/2022      PCP: Marina Monge MD     CC: F/U for shortness of breath    Days in hospital:  1    SUBJECTIVE / Interval History:  Patient awake on the vent. Writes down that she is thirsty. Denies chest pain. Patient having a lot of thick secretions. Moving all extremities    At present intubated and sedated. Review of system limited due to intubation and sedation  Yesterday evening patient decompensated. Bradycardic followed by CODE BLUE.  CPR initiated. Patient was transferred to the ICU. There was some concern about possible stroke with left-sided weakness after the CODE BLUE. HOWARD noted. Urine output 300 mm  Allergies  Patient has no known allergies. Medications    Scheduled Meds:   Roflumilast  500 mcg Oral Daily    mometasone-formoterol  2 puff Inhalation BID    tiotropium  2 puff Inhalation Daily    sodium chloride flush  5-40 mL IntraVENous 2 times per day    aspirin  81 mg Oral Daily    atorvastatin  80 mg Oral Nightly    [Held by provider] furosemide  40 mg IntraVENous BID    azithromycin  500 mg IntraVENous Q24H    methylPREDNISolone  40 mg IntraVENous Q6H    miconazole   Topical BID    oyster shell calcium w/D  1 tablet Oral Daily     Continuous Infusions:   sodium chloride      propofol 20 mcg/kg/min (11/07/22 0629)    fentaNYL 50 mcg/hr (11/07/22 0629)    norepinephrine      heparin (PORCINE) Infusion 1,430 Units/hr (11/07/22 0629)       PRN Meds:  sodium chloride flush, sodium chloride, acetaminophen **OR** acetaminophen, perflutren lipid microspheres, ondansetron, guaiFENesin-dextromethorphan, ipratropium-albuterol, clonazePAM    Vitals    BP 99/66   Pulse 73   Temp 98.2 °F (36.8 °C) (Axillary)   Resp 22   Ht 5' 3\" (1.6 m)   Wt 179 lb 10.8 oz (81.5 kg)   SpO2 100%   BMI 31.83 kg/m²     Exam:    Gen: No distress. Eyes: PERRL. No sclera icterus. No conjunctival injection. ENT: No discharge. Pharynx clear.  External appearance of ears and nose normal.  Neck: Trachea midline. No obvious mass. Resp: No accessory muscle use. No crackles. No wheezes. No rhonchi. No dullness on percussion. CV: Regular rate. Regular rhythm. No murmur or rub. No edema. GI: Non-tender. Non-distended. No hernia. Skin: Warm, dry, normal texture and turgor. No nodule on exposed extremities. Lymph: No cervical LAD. No supraclavicular LAD. M/S: No cyanosis. No clubbing. No joint deformity. Neuro: Exam is limited due to intubation/ BIPAP/ sedation    Psych: Exam is limited due to intubation/ BIPAP/ sedation      Data    LABS  CBC:   Recent Labs     11/06/22  0537 11/06/22 1647 11/07/22  0450   WBC 7.0 9.4 10.8   HGB 12.1 13.4 11.5*   HCT 36.4 40.9 33.8*   MCV 94.3 95.2 92.6    245 203     BMP:   Recent Labs     11/06/22  0537 11/06/22 1647 11/07/22  0450    140 140   K 4.3 4.5 4.3    100 98*   CO2 26 15* 23   PHOS 4.6  --  4.0   BUN 23* 35* 51*   CREATININE 1.2 1.5* 2.5*   GLUCOSE 166* 227* 120*     POC GLUCOSE:    Recent Labs     11/06/22  1754 11/06/22  1946 11/07/22  0022 11/07/22  0426   POCGLU 214* 172* 110* 139*     LIVER PROFILE:   Recent Labs     11/06/22  0100 11/06/22  0537 11/06/22 1647 11/07/22  0450   AST 21  --  221*  --    ALT 19  --  153*  --    LABALBU 4.2 4.1 4.2 3.7   BILITOT 0.3  --  0.5  --    ALKPHOS 192*  --  181*  --      PT/INR:   Recent Labs     11/06/22 1648   PROTIME 13.7   INR 1.06     APTT:   Recent Labs     11/06/22  0917 11/06/22  1648 11/07/22  0201   APTT 44.3* >180.0* 42.5*     UA:No results for input(s): NITRITE, COLORU, PHUR, LABCAST, WBCUA, RBCUA, MUCUS, TRICHOMONAS, YEAST, BACTERIA, CLARITYU, SPECGRAV, LEUKOCYTESUR, UROBILINOGEN, BILIRUBINUR, BLOODU, GLUCOSEU, KETUA, AMORPHOUS in the last 72 hours. Microbiology:  Wound Culture: No results for input(s): WNDABS, ORG in the last 72 hours. Invalid input(s):  LABGRAM  Nasal Culture: No results for input(s): ORG, MRSAPCR in the last 72 hours. Blood Culture:  No results for input(s): BC, Lucy Hathaway in the last 72 hours. Fungal Culture:   No results for input(s): FUNGSM in the last 72 hours. No results for input(s): FUNCXBLD in the last 72 hours. CSF Culture:  No results for input(s): COLORCSF, APPEARCSF, CFTUBE, CLOTCSF, WBCCSF, RBCCSF, NEUTCSF, NUMCELLSCSF, LYMPHSCSF, MONOCSF, GLUCCSF, VOLCSF in the last 72 hours. Respiratory Culture:  No results for input(s): Heidi Camp in the last 72 hours. AFB:No results for input(s): AFBSMEAR in the last 72 hours. Urine Culture  No results for input(s): LABURIN in the last 72 hours. RADIOLOGY:    CTA HEAD NECK W CONTRAST   Preliminary Result   Severely motion degraded exam without large vessel occlusion in the head or   neck. CT HEAD WO CONTRAST   Final Result   Addendum (preliminary) 1 of 1   ADDENDUM:   Results were called to Dr. Michoacano Shields at 6:23 p.m. Les Pimple Final   No evidence of acute intracranial process. Remote bilateral cerebellar hemisphere infarcts noted along with a small   remote lacunar infarct of the left caudate head. XR CHEST PORTABLE   Final Result   The endotracheal tube ends appropriately above the marita and below the   clavicular heads. The nasogastric tube ends within the stomach. XR CHEST PORTABLE   Final Result   Mild cardiomegaly and mild pulmonary edema. 1.5 cm slightly dense nodule right upper lobe which is unchanged             CONSULTS:    [address] [facility] NURSE/COORDINATOR  IP CONSULT TO DIETITIAN  IP CONSULT TO CARDIOLOGY  IP CONSULT TO CRITICAL CARE  IP CONSULT TO NEPHROLOGY    ASSESSMENT AND PLAN:      Active Problems:    Congestive heart failure ([address]    NSTEMI (non-ST elevated myocardial infarction) ([address]    COPD exacerbation (HCC)    Acute respiratory failure with hypoxia ([address]    Acute pulmonary edema (HCC)  Resolved Problems:    * No resolved hospital problems.  *    Patient is a 71-year-old female with a past medical history of given to answer questions, a plan was proposed and they agreed with plan. Discussed with consulting physicians, nursing and social work     The note was completed using EMR. Every effort was made to ensure accuracy; however, inadvertent computerized transcription errors may be present.        Layne Barkley MD

## 2022-11-07 NOTE — CONSULTS
Pulmonary Progress Note    Date of Admission: 11/6/2022   LOS: 1 day     CC:  Chief Complaint   Patient presents with    Shortness of Breath           Assessment/Plan   Cardiac arrest  -Bradycardic event then lost pulses  -ROSC within 8 minutes  -Awake and following commands    Acute on chronic hypoxemic respiratory failure with SPO2 less than 90% on room air  -Full vent support, Wean supplemental oxygen to goal saturation of >90%  -Possible congestive heart failure, echocardiogram pending  -SAT/SBT    Acute exacerbation of COPD  -Dulera, Spiriva, Daliresp  -Daily Solu-Medrol  -Doxycycline    Peridex  Pepcid  Heparin drip for possible NSTEMI    Due to the immediate potential for life-threatening deterioration due to cardiac arrest and respiratory failure, I spent 33 minutes providing critical care. This time is excluding time spent performing separately billable procedures. HPI/Subjective  76-year-old smoker with a history of COPD and chronic hypoxemia presents for shortness of breath. Patient notified HPI she is mechanically ventilated is obtained from EMR and bedside nursing report. Patient was initially admitted to the floor but then had a bradycardic episode which deteriorated into PEA. Underwent CPR with return of spontaneous circulation. Therapeutic hypothermia was not pursued given the fact the patient had woke up and had good mental status. ROS: Unable to obtain due to mechanical ventilation        Intake/Output Summary (Last 24 hours) at 11/7/2022 1321  Last data filed at 11/7/2022 1238  Gross per 24 hour   Intake 1017.49 ml   Output 416 ml   Net 601.49 ml         PHYSICAL EXAM:   Blood pressure 102/60, pulse 78, temperature 98.4 °F (36.9 °C), temperature source Axillary, resp. rate 22, height 5' 3\" (1.6 m), weight 179 lb 10.8 oz (81.5 kg), SpO2 97 %, not currently breastfeeding.'  Gen:  No acute distress. Eyes: PERRL. Anicteric sclera. No conjunctival injection. ENT: No discharge.   OP with ETT external appearance of ears and nose normal.  Neck: Trachea midline. No mass   Resp:  No crackles. No wheezes. No rhonchi. No dullness on percussion. CV: Regular rate. Regular rhythm. No murmur or rub. No edema. GI: Soft, Non-tender. Non-distended. +BS  Skin: Warm, dry, w/o erythema. Lymph: No cervical or supraclavicular LAD. M/S: No cyanosis. No clubbing.     Neuro: Awake on vent, follows commands, intubated      Medications:    Scheduled Meds:   [START ON 11/8/2022] doxycycline (VIBRAMYCIN) IV  100 mg IntraVENous Q12H    [START ON 11/8/2022] methylPREDNISolone  40 mg IntraVENous Daily    chlorhexidine  15 mL Mouth/Throat BID    famotidine (PEPCID) injection  20 mg IntraVENous Daily    Roflumilast  500 mcg Oral Daily    mometasone-formoterol  2 puff Inhalation BID    tiotropium  2 puff Inhalation Daily    sodium chloride flush  5-40 mL IntraVENous 2 times per day    aspirin  81 mg Oral Daily    [Held by provider] furosemide  40 mg IntraVENous BID    miconazole   Topical BID       Continuous Infusions:   sodium chloride      propofol 20 mcg/kg/min (11/07/22 0629)    fentaNYL 50 mcg/hr (11/07/22 0629)    norepinephrine      heparin (PORCINE) Infusion 1,430 Units/hr (11/07/22 0629)       PRN Meds:  perflutren lipid microspheres, sodium chloride flush, sodium chloride, acetaminophen **OR** acetaminophen, perflutren lipid microspheres, ondansetron, guaiFENesin-dextromethorphan, ipratropium-albuterol, clonazePAM    Labs reviewed:  CBC:   Recent Labs     11/06/22 0537 11/06/22  1647 11/07/22  0450   WBC 7.0 9.4 10.8   HGB 12.1 13.4 11.5*   HCT 36.4 40.9 33.8*   MCV 94.3 95.2 92.6    245 203     BMP:   Recent Labs     11/06/22  0537 11/06/22  1647 11/07/22  0450    140 140   K 4.3 4.5 4.3    100 98*   CO2 26 15* 23   PHOS 4.6  --  4.0   BUN 23* 35* 51*   CREATININE 1.2 1.5* 2.5*     LIVER PROFILE:   Recent Labs     11/06/22  0100 11/06/22  1647   AST 21 221*   ALT 19 153*   BILITOT 0.3 0.5 ALKPHOS 192* 181*     PT/INR:   Recent Labs     11/06/22  1648   PROTIME 13.7   INR 1.06     APTT:   Recent Labs     11/06/22  1648 11/07/22  0201 11/07/22  1123   APTT >180.0* 42.5* 142.7*     UA:No results for input(s): NITRITE, COLORU, PHUR, LABCAST, WBCUA, RBCUA, MUCUS, TRICHOMONAS, YEAST, BACTERIA, CLARITYU, SPECGRAV, LEUKOCYTESUR, UROBILINOGEN, BILIRUBINUR, BLOODU, GLUCOSEU, AMORPHOUS in the last 72 hours. Invalid input(s): Sabrina Gay  No results for input(s): PH, PCO2, PO2 in the last 72 hours. Films:  Radiology Review:  Pertinent images / reports were reviewed as a part of this visit. CT HEAD WO CONTRAST  Addendum: ADDENDUM:   Results were called to Dr. Michoacano Shields at 6:23 p.m..  Narrative: EXAMINATION:  CT OF THE HEAD WITHOUT CONTRAST  11/6/2022 5:13 pm    TECHNIQUE:  CT of the head was performed without the administration of intravenous  contrast. Automated exposure control, iterative reconstruction, and/or weight  based adjustment of the mA/kV was utilized to reduce the radiation dose to as  low as reasonably achievable. COMPARISON:  None available. HISTORY:  ORDERING SYSTEM PROVIDED HISTORY: suspected stroke  TECHNOLOGIST PROVIDED HISTORY:  Reason for exam:->suspected stroke  Has a \"code stroke\" or \"stroke alert\" been called? ->Yes  Reason for Exam: suspected stroke    FINDINGS:  BRAIN/VENTRICLES: The gyri and sulci have a normal appearance. Ventricles  and extra-axial spaces appear normal.  Remote left caudate head lacunar  infarct. Encephalomalacia within the right left cerebellar hemispheres  compatible with areas of remote infarct. The gray-white matter  differentiation is otherwise preserved throughout. There is no acute  intracranial hemorrhage. No intra-axial or extra-axial mass or findings of  mass effect. No shift of midline structures. No abnormal extra-axial fluid  collections. No acute territorial infarct.     ORBITS: The visualized portion of the orbits demonstrate no acute abnormality. SINUSES: The mastoid air cells are normally aerated. Mild chronic mucosal  thickening throughout the paranasal sinuses. SOFT TISSUES/SKULL: No significant abnormality of the visualized skull or  soft tissues. No acute fracture. No scalp hematoma. Impression: No evidence of acute intracranial process. Remote bilateral cerebellar hemisphere infarcts noted along with a small  remote lacunar infarct of the left caudate head. CTA HEAD NECK W CONTRAST  Narrative: EXAMINATION:  CTA OF THE HEAD AND NECK WITH CONTRAST 11/6/2022 6:15 pm:    TECHNIQUE:  CTA of the head and neck was performed with the administration of intravenous  contrast. Multiplanar reformatted images are provided for review. MIP images  are provided for review. Stenosis of the internal carotid arteries measured  using NASCET criteria. Automated exposure control, iterative reconstruction,  and/or weight based adjustment of the mA/kV was utilized to reduce the  radiation dose to as low as reasonably achievable. COMPARISON:  None    HISTORY:  ORDERING SYSTEM PROVIDED HISTORY: suspected stroke  TECHNOLOGIST PROVIDED HISTORY:  Reason for exam:->suspected stroke  Has a \"code stroke\" or \"stroke alert\" been called? ->Yes  Reason for Exam: code, poss stroke    FINDINGS:  The examination is motion degraded. CTA NECK:    AORTIC ARCH/ARCH VESSELS: No dissection or arterial injury. No significant  stenosis of the brachiocephalic or subclavian arteries. CAROTID ARTERIES: Motion obscures the carotid bifurcations. Calcified plaque  causes mild-to-moderate stenosis of the proximal right internal carotid  artery. Exact measurement of stenosis is obscured due to motion. Calcified  plaque causes mild stenosis of the proximal left internal carotid artery. VERTEBRAL ARTERIES: The vertebral artery V3 segments are completely obscured  by motion. The V1 and V2 segments are patent.   The V4 segments are patent,  which suggests that the V3 segments are also patent. SOFT TISSUES: The lung apices are clear. No cervical or superior mediastinal  lymphadenopathy. The larynx and pharynx are unremarkable. No acute  abnormality of the salivary and thyroid glands. BONES: No acute osseous abnormality. CTA HEAD:    ANTERIOR CIRCULATION: No significant stenosis of the intracranial internal  carotid, anterior cerebral, or middle cerebral arteries. No aneurysm. POSTERIOR CIRCULATION: No significant stenosis of the vertebral, basilar, or  posterior cerebral arteries. No aneurysm. OTHER: No dural venous sinus thrombosis on this non-dedicated study. BRAIN: No mass effect or midline shift. No extra-axial fluid collection. The  gray-white differentiation is maintained. Impression: Severely motion degraded exam without large vessel occlusion in the head or  neck. XR CHEST PORTABLE  Narrative: EXAMINATION:  ONE XRAY VIEW OF THE CHEST    11/6/2022 3:31 pm    COMPARISON:  Chest radiograph from earlier the same day    HISTORY:  ORDERING SYSTEM PROVIDED HISTORY: ETT placement, s/p code  TECHNOLOGIST PROVIDED HISTORY:  Reason for exam:->ETT placement, s/p code  Reason for Exam: ETT placement, s/p code    FINDINGS:  A portable upright frontal view chest radiograph was obtained. The heart is enlarged. The mediastinal contour and pleural spaces are  otherwise within normal limits. The lungs are grossly clear. There is no  focal consolidation or pneumothorax. The pulmonary vascular pattern is  within normal limits. No acute thoracic osseous abnormality. Impression: The endotracheal tube ends appropriately above the marita and below the  clavicular heads. The nasogastric tube ends within the stomach.   XR CHEST PORTABLE  Narrative: EXAMINATION:  ONE XRAY VIEW OF THE CHEST    11/6/2022 12:48 am    COMPARISON:  03/13/2021    HISTORY:  ORDERING SYSTEM PROVIDED HISTORY: SOB  TECHNOLOGIST PROVIDED HISTORY:  Reason for exam:->SOB  Reason for Exam: SOB    FINDINGS:  The heart is mildly enlarged and more prominent. The pulmonary vessels are  engorged centrally and more prominent. There are hazy interstitial and  ground-glass opacities along the perihilar regions and lung bases which is  more apparent. There is a hyperdense pulmonary nodule along the right upper  lobe measuring 1.5 cm which is unchanged. There are multiple old rib  fractures on right which are unchanged. No effusion or pneumothorax is seen. Impression: Mild cardiomegaly and mild pulmonary edema. 1.5 cm slightly dense nodule right upper lobe which is unchanged          Access  CVC   Arterial          PICC        PICC Triple Lumen 11/06/22 Right Brachial (Active)   Central Line Being Utilized Yes 11/07/22 1215   Criteria for Appropriate Use Hemodynamically unstable, requiring monitoring lines, vasopressors, or volume resuscitation 11/07/22 1215   Site Assessment Clean, dry & intact 11/07/22 1215   Phlebitis Assessment No symptoms 11/07/22 1215   Infiltration Assessment 0 11/07/22 1215   Extremity Circumference (cm) 31 cm 11/06/22 1926   External Catheter Length (cm) 0 cm 11/06/22 1926   Proximal Lumen Color/Status Red; Infusing;Capped 11/07/22 1215   Medial Lumen Status White; Infusing;Capped 11/07/22 1215   Distal Lumen Color/Status Unique Fear; Infusing;Capped 11/07/22 1215   Line Care Connections checked and tightened 11/07/22 1215   Alcohol Cap Used Yes 11/07/22 1215   Date of Last Dressing Change 11/06/22 11/07/22 1215   Dressing Type Transparent; Antimicrobial;Securing device 11/07/22 1215   Dressing Status New dressing applied;Clean, dry & intact 11/06/22 1926   Dressing Intervention New 11/06/22 1926   Number of days: 0       CVC                  Joy  Urinary Catheter 11/06/22 Joy (Active)   Catheter Indications Need for fluid volume management of the critically ill patient in a critical care setting 11/07/22 0600   Site Assessment No urethral drainage 11/07/22 0600   Urine Color Yellow 11/07/22 0600   Urine Appearance Clear 11/07/22 0600   Urine Odor Other (Comment) 11/07/22 0600   Collection Container Standard 11/07/22 0600   Securement Method Securing device (Describe) 11/07/22 0600   Catheter Care Completed Yes 11/06/22 1635   Catheter Best Practices  Drainage tube clipped to bed;Catheter secured to thigh; Tamper seal intact; Bag below bladder;Bag not on floor; Lack of dependent loop in tubing;Drainage bag less than half full 11/07/22 0600   Status Draining;Patent 11/07/22 0600   Output (mL) 100 mL 11/07/22 1238   Number of days: 0         Thank you for this consult,    Carlos Roth MD  [facility] Pulmonary, Critical Care, and Sleep Medicine

## 2022-11-07 NOTE — PROGRESS NOTES
Clinical Pharmacy Note  Heparin Dosing       Lab Results   Component Value Date/Time    APTT 142.7 11/07/2022 11:23 AM     Lab Results   Component Value Date/Time    HGB 11.5 11/07/2022 04:50 AM    HCT 33.8 11/07/2022 04:50 AM     11/07/2022 04:50 AM    INR 1.06 11/06/2022 04:48 PM       Current Infusion Rate: 1430 units/hr    Plan:  Hold heparin drip for 1 hour  Rate: decrease to 1180 units/hr  Next aPTT: 2030 11/7/22    Pharmacy will continue to monitor and adjust based on aPTT results.   Courtney Escalera [facility],11/7/2022,1:39 PM

## 2022-11-07 NOTE — PROGRESS NOTES
Clinical Pharmacy Note  Heparin Dosing       Lab Results   Component Value Date/Time    APTT >180.0 11/06/2022 04:48 PM     Lab Results   Component Value Date/Time    HGB 13.4 11/06/2022 04:47 PM    HCT 40.9 11/06/2022 04:47 PM     11/06/2022 04:47 PM    INR 1.06 11/06/2022 04:48 PM       Current Infusion Rate: 1340 units/hr  MD changed to high dose heparin protocol    Plan:  Hold heparin drip for 1 hour  Rate: decrease to 1090 units/hr  Next aPTT: 11/07/22 0200    Pharmacy will continue to monitor and adjust based on aPTT results.   Courtney Escalera [facility],11/6/2022,8:21 PM

## 2022-11-07 NOTE — CONSULTS
Comprehensive Nutrition Assessment    Type and Reason for Visit:  Initial, Consult    Nutrition Recommendations/Plan:   If TF desired, recommend Vital HP with goal rate of 30 ml/hr (calcualted over 20 hr) + Proteinex BID. Flushes per provider. Malnutrition Assessment:  Malnutrition Status:  No malnutrition (11/07/22 1104)      Nutrition Assessment:    Pt seen for new vent and consult for HF diet education. Pt originally admitted with HF, NSTEMI. Code blue yesterday, now intubated and sedated in ICU. MAP 73, levo ordered but doesn't appear to have needed any yet. Propofol at 10.2 ml/hr providing 269 kcal from lipids at current rate. Will provide HF edu when pt extubated and appropriate. Nutrition Related Findings:    reviewed labs, renal function worsening; no edema Wound Type: None       Current Nutrition Intake & Therapies:    Average Meal Intake: NPO  Average Supplements Intake: NPO  Diet NPO  Current Tube Feeding (TF) Orders:  Goal TF & Flush Orders Provides: Recommend Vital HP with goal rate of 30 ml/hr (calcualted over 20 hr) + Proteinex BID. This provides 600 ml TV, 600 kcal, 52 gm of protein, and 501 ml free fluid. Proteintex BID provides an additional 208 kcal, 52 gm of protein. Flushes per provider. Anthropometric Measures:  Height: 5' 3\" (160 cm)  Ideal Body Weight (IBW): 115 lbs (52 kg)    Admission Body Weight: 187 lb (84.8 kg)  Current Body Weight: 179 lb 10.8 oz (81.5 kg), 156.2 % IBW. Current BMI (kg/m2): 31.8                          BMI Categories: Obese Class 1 (BMI 30.0-34. 9)    Estimated Daily Nutrient Needs:  Energy Requirements Based On: Kcal/kg  Weight Used for Energy Requirements: Current  Energy (kcal/day): 897-1141 kcal (11-14 kcal/kg ABW)  Weight Used for Protein Requirements: Ideal  Protein (g/day): 104 gm (2 gm/kg ABW)  Method Used for Fluid Requirements: Other (Comment)  Fluid (ml/day): per MD d/t ICU status    Nutrition Diagnosis:   Inadequate oral intake related to abdominal pain impaired respiratory function as evidenced by NPO or clear liquid status due to medical condition, intubation    Nutrition Interventions:   Food and/or Nutrient Delivery: Continue NPO  Nutrition Education/Counseling: No recommendation at this time  Coordination of Nutrition Care: Continue to monitor while inpatient       Goals:     Goals: Initiate nutrition support, by next RD assessment       Nutrition Monitoring and Evaluation:   Behavioral-Environmental Outcomes: None Identified  Food/Nutrient Intake Outcomes: Enteral Nutrition Intake/Tolerance  Physical Signs/Symptoms Outcomes: Biochemical Data, Hemodynamic Status, Nutrition Focused Physical Findings, Skin, Weight    Discharge Planning:     Too soon to determine     Trae Tuttle, [address] [place] [address], LD  Contact: [phone]

## 2022-11-07 NOTE — PROGRESS NOTES
[address]  Cardiology Progress Note        CC:       Shortness of breath /elevated troponins             HPI:   This is a 71 y.o. female who comes to the hospital with several days history of shortness of breath cough inability to lay flat. She has COPD and is on oxygen at home. Admission proBNP is 8318. EKG is normal creatinine is normal and hemoglobin is normal      Dank Lane is a 71 y.o. female who presents to the ED for shortness of breath. Patient reports 4 days of worsening shortness of breath that became severe tonight. Her neighbor called EMS on her behalf. She denies any chest pain at any time throughout this. She denies also any fevers, chills or sweats. No vomiting or diarrhea. No significant URI symptoms. She denies also any history of heart failure. She states she has history of COPD and used to be on oxygen but is no longer.     Home medicines include atorvastatin amlodipine home oxygen Medrol      Interval history  Patient without rest successful resuscitation now intubated  Lactic acidosis which has improved      Past Medical History:   Diagnosis Date    COPD, mild ([address]     Depression     Hypercholesteremia     Hypertension       Past Surgical History:   Procedure Laterality Date    APPENDECTOMY       SECTION      CHOLECYSTECTOMY      HYSTERECTOMY (CERVIX STATUS UNKNOWN)      KIDNEY REMOVAL        Family History   Problem Relation Age of Onset    Cancer Mother         brain and throat    Heart Attack Father       Social History     Tobacco Use    Smoking status: Every Day     Packs/day: 0.25     Years: 50.00     Pack years: 12.50     Types: Cigarettes    Smokeless tobacco: Never   Vaping Use    Vaping Use: Never used   Substance Use Topics    Alcohol use: Not Currently     Comment: weekly    Drug use: No      No Known Allergies   [START ON 2022] doxycycline (VIBRAMYCIN) IV  100 mg IntraVENous Q12H    [START ON 2022] methylPREDNISolone  40 mg IntraVENous Daily    chlorhexidine  15 mL Mouth/Throat BID    famotidine (PEPCID) injection  20 mg IntraVENous Daily    Roflumilast  500 mcg Oral Daily    mometasone-formoterol  2 puff Inhalation BID    tiotropium  2 puff Inhalation Daily    sodium chloride flush  5-40 mL IntraVENous 2 times per day    aspirin  81 mg Oral Daily    [Held by provider] furosemide  40 mg IntraVENous BID    miconazole   Topical BID       Review of Systems -   Constitutional: Negative for weight gain/loss; malaise, fever  Respiratory: Negative for Asthma;  cough and hemoptysis  Cardiovascular: Negative for palpitations,dizziness   Gastrointestinal: Negative for abd.pain; constipation/diarrhea;    Genitourinary: Negative for stones; hematuria; frequency hesitancy  Integumentt: Negative for rash or pruritis  Hematologic/lymphatic: Negative for blood dyscrasia; leukemia/lymphoma  Musculoskeletal: Negative for Connective tissue disease  Neurological:  Negative for Seizure   Behavioral/Psych:Negative for Bipolar disorder, Schizophrenia; Dementia  Endocrine: negative for thyroid, parathyroid disease      Intake/Output Summary (Last 24 hours) at 11/7/2022 1522  Last data filed at 11/7/2022 1346  Gross per 24 hour   Intake 1188.73 ml   Output 416 ml   Net 772.73 ml       Physical Examination:    /60   Pulse 78   Temp 98.4 °F (36.9 °C) (Axillary)   Resp 22   Ht 5' 3\" (1.6 m)   Wt 179 lb 10.8 oz (81.5 kg)   SpO2 97%   BMI 31.83 kg/m²    HEENT:  Face: Atraumatic, Conjunctiva: Pink; non icteric,  Mucous Memb:  Moist, No thyromegaly or Lymphadenopathy  Respiratory:  Resp Assessment: normal, Resp Auscultation: clear   Cardiovascular: Auscultation: nl S1 & S2, Palpation:  Nl PMI;  No heaves or thrills, JVP:  normal  Abdomen: Soft, non-tender, Normal bowel sounds,  No organomegaly  Extremities: No Cyanosis or Clubbing; Edema none  Neurological: Oriented to time, place, and person, Non-anxious  Psychiatric: Normal mood and affect  Skin: Warm and dry,  No rash seen      Current Facility-Administered Medications: [START ON 11/8/2022] doxycycline (VIBRAMYCIN) 100 mg in dextrose 5 % 100 mL IVPB, 100 mg, IntraVENous, Q12H  [START ON 11/8/2022] methylPREDNISolone sodium (SOLU-MEDROL) injection 40 mg, 40 mg, IntraVENous, Daily  perflutren lipid microspheres (DEFINITY) injection 1.5 mL, 1.5 mL, IntraVENous, ONCE PRN  chlorhexidine (PERIDEX) 0.12 % solution 15 mL, 15 mL, Mouth/Throat, BID  famotidine (PEPCID) 20 mg in sodium chloride (PF) 0.9 % 10 mL injection, 20 mg, IntraVENous, Daily  Roflumilast (DALIRESP) tablet 500 mcg, 500 mcg, Oral, Daily  mometasone-formoterol (DULERA) 200-5 MCG/ACT inhaler 2 puff, 2 puff, Inhalation, BID  tiotropium (SPIRIVA RESPIMAT) 2.5 MCG/ACT inhaler 2 puff, 2 puff, Inhalation, Daily  sodium chloride flush 0.9 % injection 5-40 mL, 5-40 mL, IntraVENous, 2 times per day  sodium chloride flush 0.9 % injection 5-40 mL, 5-40 mL, IntraVENous, PRN  0.9 % sodium chloride infusion, , IntraVENous, PRN  acetaminophen (TYLENOL) tablet 650 mg, 650 mg, Oral, Q6H PRN **OR** acetaminophen (TYLENOL) suppository 650 mg, 650 mg, Rectal, Q6H PRN  aspirin chewable tablet 81 mg, 81 mg, Oral, Daily  perflutren lipid microspheres (DEFINITY) injection 1.5 mL, 1.5 mL, IntraVENous, ONCE PRN  ondansetron (ZOFRAN) injection 4 mg, 4 mg, IntraVENous, Q6H PRN  [Held by provider] furosemide (LASIX) injection 40 mg, 40 mg, IntraVENous, BID  guaiFENesin-dextromethorphan (ROBITUSSIN DM) 100-10 MG/5ML syrup 5 mL, 5 mL, Oral, Q4H PRN  miconazole (MICOTIN) 2 % powder, , Topical, BID  ipratropium-albuterol (DUONEB) nebulizer solution 1 ampule, 1 ampule, Inhalation, Q4H PRN  clonazePAM (KLONOPIN) tablet 0.25 mg, 0.25 mg, Oral, Q12H PRN  propofol injection, 5-50 mcg/kg/min, IntraVENous, Continuous  fentaNYL (SUBLIMAZE) 1,000 mcg in sodium chloride 0.9% 100 mL infusion,  mcg/hr, IntraVENous, Continuous  norepinephrine (LEVOPHED) 16 mg in dextrose 5% 250 mL infusion, 1-100 mcg/min, IntraVENous, Continuous  heparin 25,000 unit in sodium chloride 0.45% 250 mL (premix) infusion, 0-4,000 Units/hr, IntraVENous, Continuous      Labs:   Recent Labs     11/06/22  1647 11/07/22  0450   WBC 9.4 10.8   HGB 13.4 11.5*   HCT 40.9 33.8*    203     Recent Labs     11/06/22  1647 11/07/22  0450    140   K 4.5 4.3   CO2 15* 23   BUN 35* 51*   CREATININE 1.5* 2.5*   GLUCOSE 227* 120*     No results for input(s): BNP in the last 72 hours. Recent Labs     11/06/22  1648   PROTIME 13.7   INR 1.06     Recent Labs     11/07/22  0201 11/07/22  1123   APTT 42.5* 142.7*     Recent Labs     11/06/22  0100 11/06/22  0537 11/06/22  1647   TROPONINI 0.08* 0.07* 0.03*     Lab Results   Component Value Date/Time    TRIG 230 10/14/2015 04:10 AM     Recent Labs     11/06/22  0100 11/06/22  0537 11/06/22  1647 11/07/22  0450   AST 21  --  221*  --    ALT 19  --  153*  --    LABALBU 4.2   < > 4.2 3.7    < > = values in this interval not displayed. EKG:   Sinus tach RBBB rate 103 No ischemic changes    Chest x-ray :cardiomegaly and mild pulmonary edema. ECHO: 10/21  Ejection fraction is visually estimated to be 60-65%. No regional wall motion abnormalities are noted. Grade I diastolic dysfunction with elevated LV filling pressures. Moderate aortic stenosis with a peak velocity of 298m/s and a max/mean pressure gradient of 20/36 mmHg. The right ventricle is normal in size and function. Echo 10/7/2022  Normal LV size and wall motion. EF is ~ 60%. indeterminate diastolic function. Trivial mitral& tricuspid regurgitation is present. Left atrium is of normal size. Very mild aortic stenosis. Mean gradient 15 mmHg. RV appears dilated .     ASSESSMENT AND PLAN:        Patient is status post cardiopulmonary arrest.  Intubated  Initially high lactic acid level which is now improved  Developed renal failure because of code      Shortness of breath  Shortness of breath orthopnea coughing for the last several weeks   proBNP is elevated and greater than 8000 suggestive of CHF on top of COPD   Echo shows normal EF, normal wall motion RV dilated ? Troponin elevation  No chest pain  0.07-0.03  ? Nstemi  On Heparin and aspirin        Cecily ANDRADE  11/7/2022

## 2022-11-07 NOTE — PROGRESS NOTES
4 Eyes Skin Assessment     NAME:  Danuta Zaragoza  YOB: 1950  MEDICAL RECORD NUMBER:  [id number]    The patient is being assess for  Shift Handoff    I agree that 2 RN's have performed a thorough Head to Toe Skin Assessment on the patient. ALL assessment sites listed below have been assessed. Areas assessed by both nurses:    Head, Face, Ears, Shoulders, Back, Chest, Arms, Elbows, Hands, Sacrum. Buttock, Coccyx, Ischium, and Legs. Feet and Heels        Does the Patient have a Wound?  No noted wound(s)       Ajit Prevention initiated:  Yes   Wound Care Orders initiated:  No    Pressure Injury (Stage 3,4, Unstageable, DTI, NWPT, and Complex wounds) if present place referral/consult order under [de-identified] No    New and Established Ostomies if present place consult order under : No      Nurse 1 eSignature: Electronically signed by Nigel Paez RN on 11/7/22 at 6:45 AM EST    **SHARE this note so that the co-signing nurse is able to place an eSignature**    Nurse 2 eSignature: Electronically signed by Khari Forman RN on 11/7/22 at 6:33 PM EST

## 2022-11-07 NOTE — PROGRESS NOTES
4 Eyes Skin Assessment     NAME:  Osbaldo Marsh  YOB: 1950  MEDICAL RECORD NUMBER:  [id number]    The patient is being assess for  Shift Handoff    I agree that 2 RN's have performed a thorough Head to Toe Skin Assessment on the patient. ALL assessment sites listed below have been assessed. Areas assessed by both nurses:    Head, Face, Ears, Shoulders, Back, Chest, Arms, Elbows, Hands, Sacrum. Buttock, Coccyx, Ischium, and Legs. Feet and Heels        Does the Patient have a Wound?  No noted wound(s)       Ajit Prevention initiated:  Yes   Wound Care Orders initiated:  No    Pressure Injury (Stage 3,4, Unstageable, DTI, NWPT, and Complex wounds) if present place referral/consult order under [de-identified] No    New and Established Ostomies if present place consult order under : No      Nurse 1 eSignature: Electronically signed by Maliha Hutson RN on 11/7/22 at 6:34 PM EST    **SHARE this note so that the co-signing nurse is able to place an eSignature**    Nurse 2 eSignature: Electronically signed by Ariana Roblero RN on 11/8/22 at 3:19 AM EST

## 2022-11-07 NOTE — CONSULTS
[address] [place] [address] Nephrology   [facility]  [phone]  Nephrology Consult Note          Patient ID: Daija Warner  Referring/ Physician: Israel Riggs MD      HPI/Summary:   Daija Warner is being seen by nephrology for Acute kidney injury (PEYTON). She is a 71 y.o. female with a PMH significant for COPD (continues to smoke), CAD, CVA, hypertension who presented to the ED 11/6/2022 with complaints progressively worsening shortness of breath. She is not known to have CHF prior to this admission. On 11/6/2022 she went into cardiac arrest with asystole needing CPR for 3 minutes. Since then her Cr has increased from 1.2 at admission to 2.5 at time of consult. Plan:   Minimal urine output this AM.  Hemodynamically improving, mental status seems good. No acute electrolyte abnormality, no acute indication for RRT  Will give a lasix challenge today to try to increase urine output. Assessment:  PEYTON:  - baseline Cr 0.8-1.0  - Cr on admission was at baseline but she unfortunately had PEA cardiac arrest.  - Cr increased from 1.2 to 2.5 within 24 hours  - oligoanuric now  - PEYTON likely 2/2 ATN from hypoperfusion. CHF:  - admitted with SOB, elevated BNP  - pulmonary edema on chest imaging.  - was getting diuresis after admission        [facility] would like to thank you for the opportunity to serve this patient. Please call with any questions or concerns. Ayana Mckeon MD  [facility]  [address], [address]  Fax: [phone]  Office: [phone]         CC/Reason for consult:   Reason for consult: PEYTON  Chief Complaint   Patient presents with    Shortness of Breath           Review of Systems:   Review of systems is unobtainable due to current clinical state.      PMH/SH/FH:    Medical Hx: reviewed and updated as appropriate  Past Medical History:   Diagnosis Date    COPD, mild ([address]     Depression     Hypercholesteremia     Hypertension          Surgical Hx: reviewed and updated as appropriate   has a past surgical history that includes Hysterectomy; Cholecystectomy;  section; Appendectomy; and Kidney removal.     Social Hx: reviewed and updated as appropriate  Social History     Tobacco Use    Smoking status: Every Day     Packs/day: 0.25     Years: 50.00     Pack years: 12.50     Types: Cigarettes    Smokeless tobacco: Never   Substance Use Topics    Alcohol use: Not Currently     Comment: weekly        Family hx: reviewed and updated as appropriate  family history includes Cancer in her mother; Heart Attack in her father. Medications:   [START ON 2022] doxycycline (VIBRAMYCIN) IV, 100 mg, Q12H  [START ON 2022] methylPREDNISolone, 40 mg, Daily  chlorhexidine, 15 mL, BID  famotidine (PEPCID) injection, 20 mg, Daily  Roflumilast, 500 mcg, Daily  mometasone-formoterol, 2 puff, BID  tiotropium, 2 puff, Daily  sodium chloride flush, 5-40 mL, 2 times per day  aspirin, 81 mg, Daily  [Held by provider] furosemide, 40 mg, BID  miconazole, , BID       Patient has no known allergies. Allergies:   No Known Allergies      Physical Exam/Objective:   Vitals:    22 1300   BP: 102/60   Pulse: 78   Resp: 22   Temp:    SpO2: 97%       Intake/Output Summary (Last 24 hours) at 2022 1358  Last data filed at 2022 1346  Gross per 24 hour   Intake 1188.73 ml   Output 416 ml   Net 772.73 ml         General appearance: in no acute distress, comfortable, communicative, awake   HEENT: no icterus, EOM intact, trachea midline. Neck : no masses, appears symmetrical and no JVD appreciated. Respiratory: Respiratory effort normal, bilateral equal chest rise. No wheeze, no crackles, ETT to vent  Cardiovascular: Ausculation shows RRR and  no edema   Abdomen: abdomen is soft, non distended, no masses, no pain with palpation.   Musculoskeletal:  no joint swelling, no deformity, strength grossly normal.   Skin: no rashes, no induration, no tightening, no jaundice

## 2022-11-07 NOTE — DISCHARGE INSTR - COC
Continuity of Care Form    Patient Name: Katlin Lehman   :  1950  MRN:  [id number]    Admit date:  2022  Discharge date:  ***    Code Status Order: Full Code   Advance Directives:     Admitting Physician:  Yoseph Gomez DO  PCP: Hilary Pringle MD    Discharging Nurse: [facility] Unit/Room#: O4O-3205/2110-01  Discharging Unit Phone Number: ***    Emergency Contact:   Extended Emergency Contact Information  Primary Emergency Contact: Sarah Cordova   [address] Phone: [phone]  Mobile Phone: [phone]  Relation: Child    Past Surgical History:  Past Surgical History:   Procedure Laterality Date    APPENDECTOMY       SECTION      CHOLECYSTECTOMY      HYSTERECTOMY (CERVIX STATUS UNKNOWN)      KIDNEY REMOVAL         Immunization History:   Immunization History   Administered Date(s) Administered    COVID-19, J&J, (age 18y+), IM, 0.5 mL 2021       Active Problems:  Patient Active Problem List   Diagnosis Code    COPD exacerbation ([facility] J44.1    Pneumonia J18.9    Acute respiratory failure (HCC) J96.00    Acute respiratory failure with hypoxia (HCC) J96.01    Acute on chronic respiratory failure with hypercapnia ([address] J96.22    Acute pulmonary edema (HCC) J81.0    COPD exacerbation ([address] J44.1    Pneumonia of both lungs due to infectious organism J18.9    Acute respiratory failure with hypoxia and hypercapnia (HCC) J96.01, J96.02    Fracture of upper arm S42.309A    Closed fracture of humerus S42.309A    Tobacco abuse Z72.0    Vitamin D deficiency E55.9    Congestive heart failure (HCC) I50.9    NSTEMI (non-ST elevated myocardial infarction) ([address] I21.4       Isolation/Infection:   Isolation            No Isolation          Patient Infection Status       Infection Onset Added Last Indicated Last Indicated By Review Planned Expiration Resolved Resolved By    None active    Resolved    COVID-19 (Rule Out) 22 COVID-19, Rapid (Ordered)   22 Rule-Out Test Resulted    COVID-19 (Rule Out) 20 COVID-19 (Ordered)   20     COVID-19 (Rule Out) 20 COVID-19 (Ordered)   20 Rule-Out Test Resulted            Nurse Assessment:  Last Vital Signs: /71   Pulse 77   Temp 98.4 °F (36.9 °C) (Axillary)   Resp 20   Ht 5' 3\" (1.6 m)   Wt 179 lb 10.8 oz (81.5 kg)   SpO2 99%   BMI 31.83 kg/m²     Last documented pain score (0-10 scale): Pain Level: 0  Last Weight:   Wt Readings from Last 1 Encounters:   22 179 lb 10.8 oz (81.5 kg)     Mental Status:  {IP PT MENTAL STATUS:52151}    IV Access:  { ABNER IV ACCESS:701583043}    Nursing Mobility/ADLs:  Walking   {CHP DME ATFA:265773422}  Transfer  {CHP DME TGBW:496669265}  Bathing  {CHP DME OSWY:181934896}  Dressing  {CHP DME SIBJ:828570539}  Toileting  {CHP DME GHBB:469991809}  Feeding  {P DME EHYO:851350376}  Med Admin  {CHP DME NRVL:738313571}  Med Delivery   { ABNER MED Delivery:699354799}    Wound Care Documentation and Therapy:        Elimination:  Continence: Bowel: {YES / RV:62570}  Bladder: {YES / WT:67277}  Urinary Catheter: {Urinary Catheter:981375253}   Colostomy/Ileostomy/Ileal Conduit: {YES / DO:31549}       Date of Last BM: ***    Intake/Output Summary (Last 24 hours) at 2022 1809  Last data filed at 2022 1346  Gross per 24 hour   Intake 1188.73 ml   Output 416 ml   Net 772.73 ml     I/O last 3 completed shifts:   In: 1027.5 [I.V.:469.7; NG/GT:60; IV Piggyback:497.8]  Out: 316 [Urine:316]    Safety Concerns:     [address] [employer] Safety Concerns:880594212}    Impairments/Disabilities:      [address] [employer] Impairments/Disabilities:880244575}    Nutrition Therapy:  Current Nutrition Therapy:   [address] [employer] Diet List:042868125}    Routes of Feeding: {CHP DME Other Feedings:172242045}  Liquids: {Slp liquid thickness:96059}  Daily Fluid Restriction: {CHP DME Yes amt example:606834531}  Last Modified Barium Swallow with Video (Video Swallowing Test): {Done Not Done [facility] System[facility]:331764616}    Treatments at the Time of Hospital Discharge:   Respiratory Treatments: ***  Oxygen Therapy:  {Therapy; copd oxygen:41968}  Ventilator:    { PAULETTE Vent KWVZ:586048295}    Rehab Therapies: {THERAPEUTIC INTERVENTION:2056010732}  Weight Bearing Status/Restrictions: { CC Weight Bearin}  Other Medical Equipment (for information only, NOT a DME order):  {EQUIPMENT:876735783}  Other Treatments: ***    Heart Failure Instructions for Daily Management  Patient was treated for acute diastolic heart failure. she  will require the following:    Please weigh daily on the same scale and approximately the same time of day. Report weight gain of 3 pounds/day or 5 pounds/week to : anatoliy HERNANDEZ, Colin Silva [phone], and Aurelio [phone]. Please use hospital discharge weight as baseline reference. Please monitor for signs and symptoms of and report to MD:  Worsening Heart Failure: sudden weight gain, shortness of breath, lower extremity or general edema/swelling, abdominal bloating/swelling, inability to lie flat, intolerance to usual activity, or cough (especially at night). Report these finding even if no increase in weight. Dehydration:  having difficulty or a decrease in urination, dizziness, worsening fatigue, or new onset/worsening of generalized weakness. Please continue a LOW SODIUM diet and LIMIT fluid intake to 48 - 64 ounces ( 1.5 - 2 liters) per day. Call Colin Silva [phone], anatoliy HERNANDEZ, and Aurelio [phone] and/or Riley Chavez @ [phone] with any questions or concerns. Please continue heart failure education to patient and family/support system. See After Visit Summary for hospital follow up appointment details. Consider spiritual care referral for support and/or completion of advance directives [phone].   Consider: having the facility MD complete required 7 day follow up, [facility]  telehealth program if patient agreeable and able to participate, palliative care consult for ongoing goals of care, end of life, and/or chronic disease management discussions, and referral to [facility] [phone] once SNF/HHC complete. Patient's personal belongings (please select all that are sent with patient):  {CHP DME Belongings:258334747}    RN SIGNATURE:  {Esignature:105272266}    CASE MANAGEMENT/SOCIAL WORK SECTION    Inpatient Status Date: 11/6/2022    Readmission Risk Assessment Score:  Readmission Risk              Risk of Unplanned Readmission:  22           FACILITY  Name: [facility]  Address:  [address] [place] [address], [address] [place] [address]   Phone:  [phone]  Program: inpatient hospice at [facility]    / signature: Electronically signed by JOSE Jerry on 11/11/22 at 4:11 PM EST    PHYSICIAN SECTION    Prognosis: {Prognosis:7215760690}    Condition at Discharge: [address] [place] [address] Patient Condition:640658527}    Rehab Potential (if transferring to Rehab): {Prognosis:5366638963}    Recommended Labs or Other Treatments After Discharge: ***    Physician Certification: I certify the above information and transfer of Rogelio Awad  is necessary for the continuing treatment of the diagnosis listed and that she requires {Admit to Appropriate Level of Care:47517} for {GREATER/LESS:152120760} 30 days.      Update Admission H&P: {CHP DME Changes in PHAFZ:651847257}    PHYSICIAN SIGNATURE:  {Esignature:985810075}

## 2022-11-08 ENCOUNTER — APPOINTMENT (OUTPATIENT)
Dept: CT IMAGING | Age: 72
DRG: 133 | End: 2022-11-08
Payer: MEDICAID

## 2022-11-08 ENCOUNTER — APPOINTMENT (OUTPATIENT)
Dept: MRI IMAGING | Age: 72
DRG: 133 | End: 2022-11-08
Payer: MEDICAID

## 2022-11-08 LAB
ALBUMIN SERPL-MCNC: 3.5 G/DL (ref 3.4–5)
ANION GAP SERPL CALCULATED.3IONS-SCNC: 18 MMOL/L (ref 3–16)
APTT: >180 SEC (ref 23–34.3)
BASE EXCESS ARTERIAL: 0.4 MMOL/L (ref -3–3)
BASOPHILS ABSOLUTE: 0 K/UL (ref 0–0.2)
BASOPHILS RELATIVE PERCENT: 0 %
BUN BLDV-MCNC: 82 MG/DL (ref 7–20)
CALCIUM SERPL-MCNC: 8.8 MG/DL (ref 8.3–10.6)
CARBOXYHEMOGLOBIN ARTERIAL: 1.4 % (ref 0–1.5)
CHLORIDE BLD-SCNC: 102 MMOL/L (ref 99–110)
CO2: 23 MMOL/L (ref 21–32)
CREAT SERPL-MCNC: 2.6 MG/DL (ref 0.6–1.2)
EKG ATRIAL RATE: 98 BPM
EKG DIAGNOSIS: NORMAL
EKG P AXIS: 76 DEGREES
EKG P-R INTERVAL: 144 MS
EKG Q-T INTERVAL: 386 MS
EKG QRS DURATION: 138 MS
EKG QTC CALCULATION (BAZETT): 492 MS
EKG R AXIS: 88 DEGREES
EKG T AXIS: -6 DEGREES
EKG VENTRICULAR RATE: 98 BPM
EOSINOPHILS ABSOLUTE: 0 K/UL (ref 0–0.6)
EOSINOPHILS RELATIVE PERCENT: 0 %
GFR SERPL CREATININE-BSD FRML MDRD: 19 ML/MIN/{1.73_M2}
GLUCOSE BLD-MCNC: 100 MG/DL (ref 70–99)
GLUCOSE BLD-MCNC: 115 MG/DL (ref 70–99)
GLUCOSE BLD-MCNC: 141 MG/DL (ref 70–99)
HCO3 ARTERIAL: 26.8 MMOL/L (ref 21–29)
HCT VFR BLD CALC: 32.4 % (ref 36–48)
HEMOGLOBIN, ART, EXTENDED: 11.4 G/DL (ref 12–16)
HEMOGLOBIN: 11 G/DL (ref 12–16)
LYMPHOCYTES ABSOLUTE: 0.5 K/UL (ref 1–5.1)
LYMPHOCYTES RELATIVE PERCENT: 5.8 %
MAGNESIUM: 2.3 MG/DL (ref 1.8–2.4)
MCH RBC QN AUTO: 31.4 PG (ref 26–34)
MCHC RBC AUTO-ENTMCNC: 34 G/DL (ref 31–36)
MCV RBC AUTO: 92.5 FL (ref 80–100)
METHEMOGLOBIN ARTERIAL: 0.7 %
MONOCYTES ABSOLUTE: 0.9 K/UL (ref 0–1.3)
MONOCYTES RELATIVE PERCENT: 9.2 %
NEUTROPHILS ABSOLUTE: 8.1 K/UL (ref 1.7–7.7)
NEUTROPHILS RELATIVE PERCENT: 85 %
O2 SAT, ARTERIAL: 97.2 %
O2 THERAPY: ABNORMAL
PCO2 ARTERIAL: 49.9 MMHG (ref 35–45)
PDW BLD-RTO: 13.8 % (ref 12.4–15.4)
PERFORMED ON: ABNORMAL
PERFORMED ON: ABNORMAL
PH ARTERIAL: 7.34 (ref 7.35–7.45)
PHOSPHORUS: 3.9 MG/DL (ref 2.5–4.9)
PLATELET # BLD: 209 K/UL (ref 135–450)
PMV BLD AUTO: 7.9 FL (ref 5–10.5)
PO2 ARTERIAL: 89.7 MMHG (ref 75–108)
POTASSIUM SERPL-SCNC: 3.4 MMOL/L (ref 3.5–5.1)
RBC # BLD: 3.5 M/UL (ref 4–5.2)
SODIUM BLD-SCNC: 143 MMOL/L (ref 136–145)
TCO2 ARTERIAL: 28.3 MMOL/L
WBC # BLD: 9.5 K/UL (ref 4–11)

## 2022-11-08 PROCEDURE — 70450 CT HEAD/BRAIN W/O DYE: CPT

## 2022-11-08 PROCEDURE — 85025 COMPLETE CBC W/AUTO DIFF WBC: CPT

## 2022-11-08 PROCEDURE — 6370000000 HC RX 637 (ALT 250 FOR IP): Performed by: NURSE PRACTITIONER

## 2022-11-08 PROCEDURE — 94640 AIRWAY INHALATION TREATMENT: CPT

## 2022-11-08 PROCEDURE — 82803 BLOOD GASES ANY COMBINATION: CPT

## 2022-11-08 PROCEDURE — 6360000002 HC RX W HCPCS: Performed by: STUDENT IN AN ORGANIZED HEALTH CARE EDUCATION/TRAINING PROGRAM

## 2022-11-08 PROCEDURE — 2700000000 HC OXYGEN THERAPY PER DAY

## 2022-11-08 PROCEDURE — 80069 RENAL FUNCTION PANEL: CPT

## 2022-11-08 PROCEDURE — 94760 N-INVAS EAR/PLS OXIMETRY 1: CPT

## 2022-11-08 PROCEDURE — 85730 THROMBOPLASTIN TIME PARTIAL: CPT

## 2022-11-08 PROCEDURE — 6370000000 HC RX 637 (ALT 250 FOR IP): Performed by: INTERNAL MEDICINE

## 2022-11-08 PROCEDURE — 99291 CRITICAL CARE FIRST HOUR: CPT | Performed by: INTERNAL MEDICINE

## 2022-11-08 PROCEDURE — 6360000002 HC RX W HCPCS: Performed by: NURSE PRACTITIONER

## 2022-11-08 PROCEDURE — 2580000003 HC RX 258: Performed by: NURSE PRACTITIONER

## 2022-11-08 PROCEDURE — 2000000000 HC ICU R&B

## 2022-11-08 PROCEDURE — 2500000003 HC RX 250 WO HCPCS: Performed by: NURSE PRACTITIONER

## 2022-11-08 PROCEDURE — 94003 VENT MGMT INPAT SUBQ DAY: CPT

## 2022-11-08 PROCEDURE — 99232 SBSQ HOSP IP/OBS MODERATE 35: CPT | Performed by: INTERNAL MEDICINE

## 2022-11-08 PROCEDURE — 83735 ASSAY OF MAGNESIUM: CPT

## 2022-11-08 PROCEDURE — 70551 MRI BRAIN STEM W/O DYE: CPT

## 2022-11-08 PROCEDURE — 2580000003 HC RX 258: Performed by: STUDENT IN AN ORGANIZED HEALTH CARE EDUCATION/TRAINING PROGRAM

## 2022-11-08 RX ORDER — NICOTINE 21 MG/24HR
1 PATCH, TRANSDERMAL 24 HOURS TRANSDERMAL DAILY
Status: DISCONTINUED | OUTPATIENT
Start: 2022-11-08 | End: 2022-11-11 | Stop reason: HOSPADM

## 2022-11-08 RX ORDER — ASPIRIN 300 MG/1
300 SUPPOSITORY RECTAL DAILY
Status: DISCONTINUED | OUTPATIENT
Start: 2022-11-08 | End: 2022-11-11 | Stop reason: HOSPADM

## 2022-11-08 RX ORDER — LIDOCAINE 4 G/G
1 PATCH TOPICAL DAILY
Status: DISCONTINUED | OUTPATIENT
Start: 2022-11-08 | End: 2022-11-11 | Stop reason: HOSPADM

## 2022-11-08 RX ORDER — POTASSIUM CHLORIDE 29.8 MG/ML
20 INJECTION INTRAVENOUS ONCE
Status: COMPLETED | OUTPATIENT
Start: 2022-11-08 | End: 2022-11-08

## 2022-11-08 RX ORDER — ONDANSETRON 2 MG/ML
4 INJECTION INTRAMUSCULAR; INTRAVENOUS EVERY 6 HOURS PRN
Status: DISCONTINUED | OUTPATIENT
Start: 2022-11-08 | End: 2022-11-11 | Stop reason: HOSPADM

## 2022-11-08 RX ORDER — POTASSIUM CHLORIDE 29.8 MG/ML
20 INJECTION INTRAVENOUS
Status: DISPENSED | OUTPATIENT
Start: 2022-11-08 | End: 2022-11-08

## 2022-11-08 RX ORDER — ONDANSETRON 4 MG/1
4 TABLET, ORALLY DISINTEGRATING ORAL EVERY 8 HOURS PRN
Status: DISCONTINUED | OUTPATIENT
Start: 2022-11-08 | End: 2022-11-11 | Stop reason: HOSPADM

## 2022-11-08 RX ORDER — ASPIRIN 81 MG/1
81 TABLET ORAL DAILY
Status: DISCONTINUED | OUTPATIENT
Start: 2022-11-08 | End: 2022-11-11 | Stop reason: HOSPADM

## 2022-11-08 RX ORDER — FUROSEMIDE 10 MG/ML
80 INJECTION INTRAMUSCULAR; INTRAVENOUS ONCE
Status: COMPLETED | OUTPATIENT
Start: 2022-11-08 | End: 2022-11-08

## 2022-11-08 RX ORDER — POLYETHYLENE GLYCOL 3350 17 G/17G
17 POWDER, FOR SOLUTION ORAL DAILY PRN
Status: DISCONTINUED | OUTPATIENT
Start: 2022-11-08 | End: 2022-11-11 | Stop reason: HOSPADM

## 2022-11-08 RX ORDER — ATORVASTATIN CALCIUM 40 MG/1
40 TABLET, FILM COATED ORAL NIGHTLY
Status: DISCONTINUED | OUTPATIENT
Start: 2022-11-08 | End: 2022-11-11 | Stop reason: HOSPADM

## 2022-11-08 RX ORDER — LORAZEPAM 2 MG/ML
0.25 INJECTION INTRAMUSCULAR ONCE
Status: COMPLETED | OUTPATIENT
Start: 2022-11-08 | End: 2022-11-09

## 2022-11-08 RX ORDER — TRAMADOL HYDROCHLORIDE 50 MG/1
50 TABLET ORAL EVERY 6 HOURS PRN
Status: DISCONTINUED | OUTPATIENT
Start: 2022-11-08 | End: 2022-11-11 | Stop reason: HOSPADM

## 2022-11-08 RX ORDER — POTASSIUM CHLORIDE 29.8 MG/ML
20 INJECTION INTRAVENOUS
Status: ACTIVE | OUTPATIENT
Start: 2022-11-08 | End: 2022-11-08

## 2022-11-08 RX ADMIN — MOMETASONE FUROATE AND FORMOTEROL FUMARATE DIHYDRATE 2 PUFF: 200; 5 AEROSOL RESPIRATORY (INHALATION) at 20:53

## 2022-11-08 RX ADMIN — SODIUM CHLORIDE, PRESERVATIVE FREE 10 ML: 5 INJECTION INTRAVENOUS at 10:19

## 2022-11-08 RX ADMIN — CHLORHEXIDINE GLUCONATE 0.12% ORAL RINSE 15 ML: 1.2 LIQUID ORAL at 08:00

## 2022-11-08 RX ADMIN — MICONAZOLE NITRATE: 2 POWDER TOPICAL at 21:25

## 2022-11-08 RX ADMIN — FENTANYL CITRATE 50 MCG: 50 INJECTION, SOLUTION INTRAMUSCULAR; INTRAVENOUS at 02:31

## 2022-11-08 RX ADMIN — Medication 20 MG: at 10:19

## 2022-11-08 RX ADMIN — MOMETASONE FUROATE AND FORMOTEROL FUMARATE DIHYDRATE 2 PUFF: 200; 5 AEROSOL RESPIRATORY (INHALATION) at 08:05

## 2022-11-08 RX ADMIN — MIDAZOLAM HYDROCHLORIDE 1 MG: 1 INJECTION, SOLUTION INTRAMUSCULAR; INTRAVENOUS at 00:27

## 2022-11-08 RX ADMIN — FUROSEMIDE 80 MG: 10 INJECTION, SOLUTION INTRAMUSCULAR; INTRAVENOUS at 15:31

## 2022-11-08 RX ADMIN — METHYLPREDNISOLONE SODIUM SUCCINATE 40 MG: 40 INJECTION, POWDER, FOR SOLUTION INTRAMUSCULAR; INTRAVENOUS at 10:08

## 2022-11-08 RX ADMIN — SODIUM CHLORIDE, PRESERVATIVE FREE 40 ML: 5 INJECTION INTRAVENOUS at 20:30

## 2022-11-08 RX ADMIN — ASPIRIN 300 MG: 300 SUPPOSITORY RECTAL at 21:25

## 2022-11-08 RX ADMIN — DOXYCYCLINE 100 MG: 100 INJECTION, POWDER, LYOPHILIZED, FOR SOLUTION INTRAVENOUS at 10:18

## 2022-11-08 RX ADMIN — Medication 20 MEQ: at 15:31

## 2022-11-08 RX ADMIN — DOXYCYCLINE 100 MG: 100 INJECTION, POWDER, LYOPHILIZED, FOR SOLUTION INTRAVENOUS at 20:27

## 2022-11-08 RX ADMIN — POTASSIUM CHLORIDE 20 MEQ: 29.8 INJECTION, SOLUTION INTRAVENOUS at 20:26

## 2022-11-08 ASSESSMENT — PAIN - FUNCTIONAL ASSESSMENT
PAIN_FUNCTIONAL_ASSESSMENT: PREVENTS OR INTERFERES SOME ACTIVE ACTIVITIES AND ADLS

## 2022-11-08 ASSESSMENT — PAIN DESCRIPTION - DESCRIPTORS
DESCRIPTORS: ACHING;DISCOMFORT
DESCRIPTORS: ACHING
DESCRIPTORS: ACHING;DISCOMFORT

## 2022-11-08 ASSESSMENT — PULMONARY FUNCTION TESTS
PIF_VALUE: 16
PIF_VALUE: 23
PIF_VALUE: 16
PIF_VALUE: 24
PIF_VALUE: 27
PIF_VALUE: 19
PIF_VALUE: 25
PIF_VALUE: 16
PIF_VALUE: 24
PIF_VALUE: 21
PIF_VALUE: 26
PIF_VALUE: 25
PIF_VALUE: 26
PIF_VALUE: 16
PIF_VALUE: 26
PIF_VALUE: 25
PIF_VALUE: 25
PIF_VALUE: 26
PIF_VALUE: 24
PIF_VALUE: 19
PIF_VALUE: 16
PIF_VALUE: 24
PIF_VALUE: 21

## 2022-11-08 ASSESSMENT — PAIN SCALES - GENERAL
PAINLEVEL_OUTOF10: 8
PAINLEVEL_OUTOF10: 0
PAINLEVEL_OUTOF10: 0
PAINLEVEL_OUTOF10: 8
PAINLEVEL_OUTOF10: 7
PAINLEVEL_OUTOF10: 8
PAINLEVEL_OUTOF10: 0

## 2022-11-08 ASSESSMENT — PAIN DESCRIPTION - LOCATION
LOCATION: BACK

## 2022-11-08 ASSESSMENT — PAIN DESCRIPTION - ORIENTATION
ORIENTATION: LOWER;MID
ORIENTATION: LOWER
ORIENTATION: LOWER;MID

## 2022-11-08 ASSESSMENT — PAIN DESCRIPTION - PAIN TYPE
TYPE: CHRONIC PAIN
TYPE: CHRONIC PAIN
TYPE: ACUTE PAIN;CHRONIC PAIN

## 2022-11-08 ASSESSMENT — PAIN DESCRIPTION - ONSET
ONSET: ON-GOING
ONSET: ON-GOING

## 2022-11-08 ASSESSMENT — PAIN DESCRIPTION - FREQUENCY
FREQUENCY: CONTINUOUS
FREQUENCY: CONTINUOUS

## 2022-11-08 NOTE — PROGRESS NOTES
[facility]                                               Progress note    CC: SOB, cardiac arrest.  Summary:   Orion Hassan is being seen by nephrology for Acute kidney injury (PEYTON). She is a 71 y.o. female with a PMH significant for COPD (continues to smoke), CAD, CVA, hypertension who presented to the ED 11/6/2022 with complaints progressively worsening shortness of breath. She is not known to have CHF prior to this admission. On 11/6/2022 she went into cardiac arrest with asystole needing CPR for 3 minutes. Since then her Cr has increased from 1.2 at admission to 2.5 at time of consult. Interval History  - seen at bedside  - /55, not on pressors. - responded really well to the lasix challenge yesterday, made 1180 mL urine  - FiO2 down to 40%  - Cr 2.6 today, seems to have plateaued. - on an SBT this AM    Plan:   - UO slowing down today, good response to IV lasix yesterday  - will re-dose lasix IV 80 mg once today  - monitor and replace electrolytes prn, replace K today, IV 40 mEq ordered. Mag Godfrey MD  [facility]  Office: [phone]    Assessment:   PEYTON:  - baseline Cr 0.8-1.0  - Cr on admission was at baseline but she unfortunately had PEA cardiac arrest.  - Cr increased from 1.2 to 2.5 within 24 hours  - oligoanuric now  - PEYTON likely 2/2 ATN from hypoperfusion. CHF:  - admitted with SOB, elevated BNP  - pulmonary edema on chest imaging.  - was getting diuresis after admission  - echo 11/7/2022 with LVEF 60%, indeterminate diastolic function, RV dilated. ROS:   Review of systems is unobtainable due to current clinical state. PMH:   Past medical history, surgical history, social history, family history are reviewed and updated as appropriate. Reviewed current medication list.   Allergies reviewed and updated as needed.      PE:   Vitals:    11/08/22 0900   BP: (!) 114/55   Pulse: 84   Resp: 20   Temp:    SpO2: 99%       General appearance: in no acute distress, comfortable, communicative, awake   HEENT: no icterus, EOM intact, trachea midline. Neck : no masses, appears symmetrical and no JVD appreciated. Respiratory: Respiratory effort normal, bilateral equal chest rise. No wheeze, no crackles, ETT to vent  Cardiovascular: Ausculation shows RRR and  no edema   Abdomen: abdomen is soft, non distended, no masses, no pain with palpation. Musculoskeletal:  no joint swelling, no deformity, strength grossly normal.   Skin: no rashes, no induration, no tightening, no jaundice   Neuro:   Follows commands, moves all extremities spontaneously       Lab Results   Component Value Date    CREATININE 2.5 (H) 11/07/2022    BUN 51 (H) 11/07/2022     11/07/2022    K 4.3 11/07/2022    CL 98 (L) 11/07/2022    CO2 23 11/07/2022      Lab Results   Component Value Date    WBC 9.5 11/08/2022    HGB 11.0 (L) 11/08/2022    HCT 32.4 (L) 11/08/2022    MCV 92.5 11/08/2022     11/08/2022     Lab Results   Component Value Date    CALCIUM 8.6 11/07/2022    CAION 1.15 10/14/2015    PHOS 4.0 11/07/2022

## 2022-11-08 NOTE — PROGRESS NOTES
66   sx small clear   extubated without trauma   placed on bipap   no distress   no stridor     Rn in room

## 2022-11-08 NOTE — PROGRESS NOTES
[address]  Cardiology Progress Note        CC:       Shortness of breath /elevated troponins             HPI:   This is a 71 y.o. female who comes to the hospital with several days history of shortness of breath cough inability to lay flat. She has COPD and is on oxygen at home. Admission proBNP is 8318. EKG is normal creatinine is normal and hemoglobin is normal      Jonathan Wills is a 71 y.o. female who presents to the ED for shortness of breath. Patient reports 4 days of worsening shortness of breath that became severe tonight. Her neighbor called EMS on her behalf. She denies any chest pain at any time throughout this. She denies also any fevers, chills or sweats. No vomiting or diarrhea. No significant URI symptoms. She denies also any history of heart failure. She states she has history of COPD and used to be on oxygen but is no longer.     Home medicines include atorvastatin amlodipine home oxygen Medrol      Interval history  Still intubated alert awake and oriented wants the tube out        Past Medical History:   Diagnosis Date    COPD, mild ([address]     Depression     Hypercholesteremia     Hypertension       Past Surgical History:   Procedure Laterality Date    APPENDECTOMY       SECTION      CHOLECYSTECTOMY      HYSTERECTOMY (CERVIX STATUS UNKNOWN)      KIDNEY REMOVAL        Family History   Problem Relation Age of Onset    Cancer Mother         brain and throat    Heart Attack Father       Social History     Tobacco Use    Smoking status: Every Day     Packs/day: 0.25     Years: 50.00     Pack years: 12.50     Types: Cigarettes    Smokeless tobacco: Never   Vaping Use    Vaping Use: Never used   Substance Use Topics    Alcohol use: Not Currently     Comment: weekly    Drug use: No      No Known Allergies   doxycycline (VIBRAMYCIN) IV  100 mg IntraVENous Q12H    methylPREDNISolone  40 mg IntraVENous Daily    chlorhexidine  15 mL Mouth/Throat BID    famotidine (PEPCID) injection  20 mg IntraVENous Daily    Roflumilast  500 mcg Oral Daily    mometasone-formoterol  2 puff Inhalation BID    tiotropium  2 puff Inhalation Daily    sodium chloride flush  5-40 mL IntraVENous 2 times per day    aspirin  81 mg Oral Daily    [Held by provider] furosemide  40 mg IntraVENous BID    miconazole   Topical BID       Review of Systems -   Constitutional: Negative for weight gain/loss; malaise, fever  Respiratory: Negative for Asthma;  cough and hemoptysis  Cardiovascular: Negative for palpitations,dizziness   Gastrointestinal: Negative for abd.pain; constipation/diarrhea;    Genitourinary: Negative for stones; hematuria; frequency hesitancy  Integumentt: Negative for rash or pruritis  Hematologic/lymphatic: Negative for blood dyscrasia; leukemia/lymphoma  Musculoskeletal: Negative for Connective tissue disease  Neurological:  Negative for Seizure   Behavioral/Psych:Negative for Bipolar disorder, Schizophrenia; Dementia  Endocrine: negative for thyroid, parathyroid disease      Intake/Output Summary (Last 24 hours) at 11/8/2022 1140  Last data filed at 11/8/2022 0600  Gross per 24 hour   Intake 408.45 ml   Output 1180 ml   Net -771.55 ml       Physical Examination:    BP (!) 109/59   Pulse 85   Temp 98.8 °F (37.1 °C) (Oral)   Resp 25   Ht 5' 3\" (1.6 m)   Wt 177 lb 14.6 oz (80.7 kg)   SpO2 98%   BMI 31.52 kg/m²    HEENT:  Face: Atraumatic, Conjunctiva: Pink; non icteric,  Mucous Memb:  Moist, No thyromegaly or Lymphadenopathy  Respiratory:  Resp Assessment: normal, Resp Auscultation: clear   Cardiovascular: Auscultation: nl S1 & S2, Palpation:  Nl PMI;  No heaves or thrills, JVP:  normal  Abdomen: Soft, non-tender, Normal bowel sounds,  No organomegaly  Extremities: No Cyanosis or Clubbing; Edema none  Neurological: Oriented to time, place, and person, Non-anxious  Psychiatric: Normal mood and affect  Skin: Warm and dry,  No rash seen      Current Facility-Administered Medications: doxycycline (VIBRAMYCIN) 100 mg in dextrose 5 % 100 mL IVPB, 100 mg, IntraVENous, Q12H  methylPREDNISolone sodium (SOLU-MEDROL) injection 40 mg, 40 mg, IntraVENous, Daily  perflutren lipid microspheres (DEFINITY) injection 1.5 mL, 1.5 mL, IntraVENous, ONCE PRN  chlorhexidine (PERIDEX) 0.12 % solution 15 mL, 15 mL, Mouth/Throat, BID  famotidine (PEPCID) 20 mg in sodium chloride (PF) 0.9 % 10 mL injection, 20 mg, IntraVENous, Daily  fentaNYL (SUBLIMAZE) injection 50 mcg, 50 mcg, IntraVENous, Q2H PRN  midazolam (VERSED) injection 1 mg, 1 mg, IntraVENous, Q2H PRN  Roflumilast (DALIRESP) tablet 500 mcg, 500 mcg, Oral, Daily  mometasone-formoterol (DULERA) 200-5 MCG/ACT inhaler 2 puff, 2 puff, Inhalation, BID  tiotropium (SPIRIVA RESPIMAT) 2.5 MCG/ACT inhaler 2 puff, 2 puff, Inhalation, Daily  sodium chloride flush 0.9 % injection 5-40 mL, 5-40 mL, IntraVENous, 2 times per day  sodium chloride flush 0.9 % injection 5-40 mL, 5-40 mL, IntraVENous, PRN  0.9 % sodium chloride infusion, , IntraVENous, PRN  acetaminophen (TYLENOL) tablet 650 mg, 650 mg, Oral, Q6H PRN **OR** acetaminophen (TYLENOL) suppository 650 mg, 650 mg, Rectal, Q6H PRN  aspirin chewable tablet 81 mg, 81 mg, Oral, Daily  perflutren lipid microspheres (DEFINITY) injection 1.5 mL, 1.5 mL, IntraVENous, ONCE PRN  ondansetron (ZOFRAN) injection 4 mg, 4 mg, IntraVENous, Q6H PRN  [Held by provider] furosemide (LASIX) injection 40 mg, 40 mg, IntraVENous, BID  guaiFENesin-dextromethorphan (ROBITUSSIN DM) 100-10 MG/5ML syrup 5 mL, 5 mL, Oral, Q4H PRN  miconazole (MICOTIN) 2 % powder, , Topical, BID  ipratropium-albuterol (DUONEB) nebulizer solution 1 ampule, 1 ampule, Inhalation, Q4H PRN  clonazePAM (KLONOPIN) tablet 0.25 mg, 0.25 mg, Oral, Q12H PRN  norepinephrine (LEVOPHED) 16 mg in dextrose 5% 250 mL infusion, 1-100 mcg/min, IntraVENous, Continuous      Labs:   Recent Labs     11/07/22  0450 11/08/22  0533   WBC 10.8 9.5   HGB 11.5* 11.0*   HCT 33.8* 32.4*   PLT 203 209     Recent Labs     11/07/22  0450 11/08/22  0533    143   K 4.3 3.4*   CO2 23 23   BUN 51* 82*   CREATININE 2.5* 2.6*   GLUCOSE 120* 100*     No results for input(s): BNP in the last 72 hours. Recent Labs     11/06/22  1648   PROTIME 13.7   INR 1.06     Recent Labs     11/07/22 2017 11/08/22  0238   APTT 58.4* >180.0*     Recent Labs     11/06/22  0100 11/06/22  0537 11/06/22  1647   TROPONINI 0.08* 0.07* 0.03*     Lab Results   Component Value Date/Time    TRIG 230 10/14/2015 04:10 AM     Recent Labs     11/06/22  0100 11/06/22  0537 11/06/22  1647 11/07/22 0450 11/08/22  0533   AST 21  --  221*  --   --    ALT 19  --  153*  --   --    LABALBU 4.2   < > 4.2 3.7 3.5    < > = values in this interval not displayed. EKG:   Sinus tach RBBB rate 103 No ischemic changes    Chest x-ray :cardiomegaly and mild pulmonary edema. ECHO: 10/21  Ejection fraction is visually estimated to be 60-65%. No regional wall motion abnormalities are noted. Grade I diastolic dysfunction with elevated LV filling pressures. Moderate aortic stenosis with a peak velocity of 298m/s and a max/mean pressure gradient of 20/36 mmHg. The right ventricle is normal in size and function. Echo 10/7/2022  Normal LV size and wall motion. EF is ~ 60%. indeterminate diastolic function. Trivial mitral& tricuspid regurgitation is present. Left atrium is of normal size. Very mild aortic stenosis. Mean gradient 15 mmHg. RV appears dilated . ASSESSMENT AND PLAN:        Patient is status post cardiopulmonary arrest.  Intubated  Initially high lactic acid level which is now improved  Developed renal failure because of code      Shortness of breath  Shortness of breath orthopnea coughing for the last several weeks   proBNP is elevated and greater than 8000 suggestive of CHF on top of COPD   Echo shows normal EF, normal wall motion RV dilated ?     Troponin elevation  No chest pain  0.07-0.03  Echo showed normal wall motion and function  Will discontinue heparin        Jeffrey ANDRADE  11/8/2022

## 2022-11-08 NOTE — PROGRESS NOTES
Progress Note  Admit Date: 11/6/2022      PCP: Kylee Felix MD     CC: F/U for shortness of breath    Days in hospital:  2    SUBJECTIVE / Interval History:  Patient awake on the vent. Had present on pressure support. Plan to possibly extubate today  Family at bedside. Failed SBT yesterday    Allergies  Patient has no known allergies. Medications    Scheduled Meds:   doxycycline (VIBRAMYCIN) IV  100 mg IntraVENous Q12H    methylPREDNISolone  40 mg IntraVENous Daily    chlorhexidine  15 mL Mouth/Throat BID    famotidine (PEPCID) injection  20 mg IntraVENous Daily    Roflumilast  500 mcg Oral Daily    mometasone-formoterol  2 puff Inhalation BID    tiotropium  2 puff Inhalation Daily    sodium chloride flush  5-40 mL IntraVENous 2 times per day    aspirin  81 mg Oral Daily    [Held by provider] furosemide  40 mg IntraVENous BID    miconazole   Topical BID     Continuous Infusions:   sodium chloride      norepinephrine      heparin (PORCINE) Infusion 1,300 Units/hr (11/08/22 0554)       PRN Meds:  perflutren lipid microspheres, fentanNYL, midazolam, sodium chloride flush, sodium chloride, acetaminophen **OR** acetaminophen, perflutren lipid microspheres, ondansetron, guaiFENesin-dextromethorphan, ipratropium-albuterol, clonazePAM    Vitals    BP (!) 107/59   Pulse 80   Temp 98.8 °F (37.1 °C) (Oral)   Resp 22   Ht 5' 3\" (1.6 m)   Wt 177 lb 14.6 oz (80.7 kg)   SpO2 100%   BMI 31.52 kg/m²     Exam:    Gen: No distress. Eyes: PERRL. No sclera icterus. No conjunctival injection. ENT: No discharge. Pharynx clear. External appearance of ears and nose normal.  Neck: Trachea midline. No obvious mass. Resp: No accessory muscle use. No crackles. No wheezes. No rhonchi. No dullness on percussion. CV: Regular rate. Regular rhythm. No murmur or rub. No edema. GI: Non-tender. Non-distended. No hernia. Skin: Warm, dry, normal texture and turgor. No nodule on exposed extremities.    Lymph: No cervical LAD. No supraclavicular LAD. M/S: No cyanosis. No clubbing. No joint deformity. Neuro: Exam is limited due to intubation/ BIPAP/ sedation    Psych: Exam is limited due to intubation/ BIPAP/ sedation      Data    LABS  CBC:   Recent Labs     11/06/22  1647 11/07/22  0450 11/08/22  0533   WBC 9.4 10.8 9.5   HGB 13.4 11.5* 11.0*   HCT 40.9 33.8* 32.4*   MCV 95.2 92.6 92.5    203 209       BMP:   Recent Labs     11/06/22  0537 11/06/22  1647 11/07/22  0450    140 140   K 4.3 4.5 4.3    100 98*   CO2 26 15* 23   PHOS 4.6  --  4.0   BUN 23* 35* 51*   CREATININE 1.2 1.5* 2.5*   GLUCOSE 166* 227* 120*       POC GLUCOSE:    Recent Labs     11/06/22  1754 11/06/22  1946 11/07/22  0022 11/07/22  0426 11/07/22 2017   POCGLU 214* 172* 110* 139* 112*       LIVER PROFILE:   Recent Labs     11/06/22  0100 11/06/22  0537 11/06/22  1647 11/07/22  0450   AST 21  --  221*  --    ALT 19  --  153*  --    LABALBU 4.2 4.1 4.2 3.7   BILITOT 0.3  --  0.5  --    ALKPHOS 192*  --  181*  --        PT/INR:   Recent Labs     11/06/22  1648   PROTIME 13.7   INR 1.06       APTT:   Recent Labs     11/07/22  1123 11/07/22 2017 11/08/22  0238   APTT 142.7* 58.4* >180.0*       UA:No results for input(s): NITRITE, COLORU, PHUR, LABCAST, WBCUA, RBCUA, MUCUS, TRICHOMONAS, YEAST, BACTERIA, CLARITYU, SPECGRAV, LEUKOCYTESUR, UROBILINOGEN, BILIRUBINUR, BLOODU, GLUCOSEU, KETUA, AMORPHOUS in the last 72 hours. Microbiology:  Wound Culture: No results for input(s): WNDABS, ORG in the last 72 hours. Invalid input(s):  LABGRAM  Nasal Culture: No results for input(s): ORG, MRSAPCR in the last 72 hours. Blood Culture: No results for input(s): BC, BLOODCULT2 in the last 72 hours. Fungal Culture:   No results for input(s): FUNGSM in the last 72 hours. No results for input(s): FUNCXBLD in the last 72 hours.   CSF Culture:  No results for input(s): COLORCSF, APPEARCSF, CFTUBE, CLOTCSF, WBCCSF, RBCCSF, NEUTCSF, NUMCELLSCSF, LYMPHSCSF, MONOCSF, GLUCCSF, VOLCSF in the last 72 hours. Respiratory Culture:  No results for input(s): Chad Dhaval in the last 72 hours. AFB:No results for input(s): AFBSMEAR in the last 72 hours. Urine Culture  No results for input(s): LABURIN in the last 72 hours. RADIOLOGY:    VL Extremity Venous Bilateral         CTA HEAD NECK W CONTRAST   Preliminary Result   Severely motion degraded exam without large vessel occlusion in the head or   neck. CT HEAD WO CONTRAST   Final Result   Addendum (preliminary) 1 of 1   ADDENDUM:   Results were called to Dr. Pratik Salcedo at 6:23 p.m. Hugo Colon Final   No evidence of acute intracranial process. Remote bilateral cerebellar hemisphere infarcts noted along with a small   remote lacunar infarct of the left caudate head. XR CHEST PORTABLE   Final Result   The endotracheal tube ends appropriately above the marita and below the   clavicular heads. The nasogastric tube ends within the stomach. XR CHEST PORTABLE   Final Result   Mild cardiomegaly and mild pulmonary edema. 1.5 cm slightly dense nodule right upper lobe which is unchanged             CONSULTS:    [facility] NURSE/COORDINATOR  IP CONSULT TO DIETITIAN  IP CONSULT TO CARDIOLOGY  IP CONSULT TO CRITICAL CARE  IP CONSULT TO NEPHROLOGY    ASSESSMENT AND PLAN:      Active Problems:    Congestive heart failure ([address]    NSTEMI (non-ST elevated myocardial infarction) ([address]    COPD exacerbation (HCC)    Acute respiratory failure with hypoxia ([address]    Acute pulmonary edema (HCC)  Resolved Problems:    * No resolved hospital problems. *    Patient is a 73-year-old female with a past medical history of COPD, hypertension, hyper lipidemia who was admitted with CHF exacerbation with possible NSTEMI and COPD exacerbation. Hospital course was complicated by bradycardia followed by CODE BLUE and patient was transferred to the ICU.   With elevated D-dimer demand ischemia shortness of breath there is concern for possible PE. Patient is started on heparin drip. Acute on chronic hypoxic respiratory failure-vent management per pulm, possible extubation  -At present intubated and sedated  -Patient uses home oxygen. Unclear how much oxygen is used at home    Cardiac arrest-cause unclear  -Bradycardia followed by PEA cardiac arrest  -With elevated D-dimer unclear if patient had a PE(presentation unlike cardiac arrest for PE)  -Already on a heparin drip for NSTEMI prior to code  -Venous duplex ordered negative for any DVT  -Unable to get a CTA chest due to PEYTON. -Echo shows some RV dilatation    Possible diastolic CHF exacerbation-on admission. Hold diuretic today  -Echo shows a EF of 60%. RV dilated  -Chest x-ray on admission showed mild pulmonary edema  -Hold off Lasix    Possible NSTEMI  -Cardiology consult appreciated  -Already on heparin  -Further work-up per cardiology  -Troponin stable    Acute kidney injury-urine output improved, creatinine still trending  -Monitor HOWARD  -Nephrology consulted    Possible COPD exacerbation  -Continue steroids with inhalers/nebulizers  -Continue doxycycline    Hypertension  -Hold blood pressure medications    Elevated LFTs-possibly due to cardiac arrest.  Repeat CMP tomorrow  -Stop statin        Some concern for possible code stroke but patient was intubated at that time. -CT head shows some remote lacunar and cerebellar infarct  -Already on aspirin and statin  -CTA head and neck shows no critical stenosis      DVT Prophylaxis: Heparin  Diet: Diet NPO  Code Status: Full Code    PT/OT Eval Status: Once extubated    Discharge plan -continue care    The patient and / or the family were informed of the results of any tests, a time was given to answer questions, a plan was proposed and they agreed with plan. Discussed with consulting physicians, nursing and social work     The note was completed using EMR.  Every effort was made to ensure accuracy; however, inadvertent computerized transcription errors may be present.        Karly Pierce MD

## 2022-11-08 NOTE — PROGRESS NOTES
RN called code stroke at 46 for newly noticed L sided facial droop. MD Afful to bedside. Blood sugar 141. NIHSS preformed by ICU RN score 10.  stroke team called to MD Affuls phone. VSS at this time.  Awaiting orders

## 2022-11-08 NOTE — PROGRESS NOTES
4 Eyes Skin Assessment     NAME:  Daija Warner  YOB: 1950  MEDICAL RECORD NUMBER:  [id number]    The patient is being assess for  Shift Handoff    I agree that 2 RN's have performed a thorough Head to Toe Skin Assessment on the patient. ALL assessment sites listed below have been assessed. Areas assessed by both nurses:    Head, Face, Ears, Shoulders, Back, Chest, Arms, Elbows, Hands, Sacrum. Buttock, Coccyx, Ischium, and Legs. Feet and Heels        Does the Patient have a Wound?  No noted wound(s)       Ajit Prevention initiated:  Yes   Wound Care Orders initiated:  No    Pressure Injury (Stage 3,4, Unstageable, DTI, NWPT, and Complex wounds) if present place referral/consult order under [de-identified] No    New and Established Ostomies if present place consult order under : No      Nurse 1 eSignature: Electronically signed by Nelson Jones RN on 11/8/22 at 3:20 AM EST    **SHARE this note so that the co-signing nurse is able to place an eSignature**    Nurse 2 eSignature: Electronically signed by Callie Holcobm RN on 11/8/22 at 8:14 PM EST

## 2022-11-08 NOTE — PROGRESS NOTES
[employer]  Code stroke activated on patient. Patient extubated today and was noted to have a left facial droop of unclear timeline. On exam also has mild left upper extremity weakness. Stat CT head ordered, findings per report from radiologist concerning for right MCA distribution stroke. MRI and CTA head and neck ordered. Given patient's underlying CKD, notified nephrology of pending testing and exposure to contrast.  Discussed case with Dr. Jose Elias Sarah MM of the stroke team at [facility] who recommended CTA head and neck at this time. Continue every 4 hourly neurochecks. Follow-up on imaging findings. Neurology consulted.

## 2022-11-08 NOTE — PROGRESS NOTES
Clinical Pharmacy Note  Heparin Dosing       Lab Results   Component Value Date/Time    APTT >180.0 11/08/2022 02:38 AM     Lab Results   Component Value Date/Time    HGB 11.5 11/07/2022 04:50 AM    HCT 33.8 11/07/2022 04:50 AM     11/07/2022 04:50 AM    INR 1.06 11/06/2022 04:48 PM       Current Infusion Rate: 1430 units/hr    Plan:  Hold heparin for 1 hour  Rate: decrease to 1300 units/hr  Next aPTT: 1200 11/8/22    Pharmacy will continue to monitor and adjust based on aPTT results.   Greg Singh, PharmD

## 2022-11-08 NOTE — PROGRESS NOTES
Pulmonary Critical Care Progress Note     Patient's name:  Artie Montes  Medical Record Number: [id number]  Patient's account/billing number: [de-identified]  Patient's YOB: 1950  Age: 71 y.o. Date of Admission: 11/6/2022 12:29 AM  Date of Consult: 11/8/2022      Primary Care Physician: Kade Hager MD      Code Status: Full Code    Chief complaint: Acute respiratory with hypoxia. Assessment and Plan     Acute respiratory with hypoxia and hypercapnia intubated on mechanical ventilation. Cardiac arrest likely secondary to respiratory arrest.  COPD with acute exacerbation. Acute kidney injury  Chronic diastolic heart failure. Lung nodules. Plan:  Vent support, started on SBT, wean PSV to keep rate less than 30. Wean sedation off, use precedex if needed. Systemic steroid and bronchodilators. Doxycycline. Check procalcitonin. Monitor kidney function and urine output. Avoid nephrotoxins. GI prophylaxis. Overnight:  No acute events overnight did not pass SBT. Echocardiogram with normal ejection fraction and indeterminate diastolic function. Mild aortic stenosis. No evidence of DVTs on Doppler. REVIEW OF SYSTEMS:  Unable to obtain intubated on mechanical ventilation        Physical Exam:    Vitals: BP (!) 114/55   Pulse 84   Temp 98.8 °F (37.1 °C) (Oral)   Resp 20   Ht 5' 3\" (1.6 m)   Wt 177 lb 14.6 oz (80.7 kg)   SpO2 99%   BMI 31.52 kg/m²     Last Body weight:   Wt Readings from Last 3 Encounters:   11/08/22 177 lb 14.6 oz (80.7 kg)   08/05/22 187 lb 6.3 oz (85 kg)   03/13/21 75 lb 8.1 oz (34.2 kg)       Body Mass Index : Body mass index is 31.52 kg/m². Intake and Output summary:   Intake/Output Summary (Last 24 hours) at 11/8/2022 0946  Last data filed at 11/8/2022 0600  Gross per 24 hour   Intake 408.45 ml   Output 1180 ml   Net -771.55 ml       Physical Examination:     Gen: Awake intubated on mechanical ventilation  Eyes: PERRL.

## 2022-11-08 NOTE — PLAN OF CARE
Problem: Discharge Planning  Goal: Discharge to home or other facility with appropriate resources  Outcome: Progressing     Problem: Skin/Tissue Integrity  Goal: Absence of new skin breakdown  Description: 1. Monitor for areas of redness and/or skin breakdown  2. Assess vascular access sites hourly  3. Every 4-6 hours minimum:  Change oxygen saturation probe site  4. Every 4-6 hours:  If on nasal continuous positive airway pressure, respiratory therapy assess nares and determine need for appliance change or resting period. Outcome: Progressing     Problem: ABCDS Injury Assessment  Goal: Absence of physical injury  Outcome: Progressing     Problem: Safety - Adult  Goal: Free from fall injury  Outcome: Progressing     Problem: Chronic Conditions and Co-morbidities  Goal: Patient's chronic conditions and co-morbidity symptoms are monitored and maintained or improved  Outcome: Progressing     Problem: Pain  Goal: Verbalizes/displays adequate comfort level or baseline comfort level  Outcome: Progressing     Problem: Safety - Medical Restraint  Goal: Remains free of injury from restraints (Restraint for Interference with Medical Device)  Description: INTERVENTIONS:  1. Determine that other, less restrictive measures have been tried or would not be effective before applying the restraint  2. Evaluate the patient's condition at the time of restraint application  3. Inform patient/family regarding the reason for restraint  4.  Q2H: Monitor safety, psychosocial status, comfort, nutrition and hydration  Outcome: Progressing  Flowsheets  Taken 11/7/2022 1218 by Romelia Chowdhury RN  Remains free of injury from restraints (restraint for interference with medical device):   Determine that other, less restrictive measures have been tried or would not be effective before applying the restraint   Evaluate the patient's condition at the time of restraint application   Inform patient/family regarding the reason for restraint

## 2022-11-08 NOTE — CARE COORDINATION
Discharge Planning:    Per chart review, patient just recently extubated and placed on bipap. Will re-attempt initial assessment in the am tomorrow in an effort to give patient some time post-extubation. Will continue to follow for updates.     Electronically signed by Melinda Pedro RN on 11/8/2022 at 3:19 PM

## 2022-11-08 NOTE — PLAN OF CARE
Brief Stroke Team Plan of Care Note    I did not see the patient but I discussed the case with the hospitalist team and reviewed the chart. In brief, this is a 71year old woman who originally presented for management of an NSTEMI/heart failure. On 11/6, she had a cardiac arrest; post arrest, she was noted to have L sided weakness. A CTA at that time was motion limited but showed no large vessel occlusion. She was extubated today and was noted to also have a Left facial; this was noticed after extubation but could not be evaluated during intubation. A CT scan showed well established R cortical infarcts. A/P: Symptoms started 2 days ago, very far out of window for IV tPA. CTA without any definite large vessel occlusion, so no further acute reperfusion therapy is indicated.   -Rest of care per primary team

## 2022-11-08 NOTE — PROGRESS NOTES
Clinical Pharmacy Note  Heparin Dosing       Lab Results   Component Value Date/Time    APTT 58.4 11/07/2022 08:17 PM     Lab Results   Component Value Date/Time    HGB 11.5 11/07/2022 04:50 AM    HCT 33.8 11/07/2022 04:50 AM     11/07/2022 04:50 AM    INR 1.06 11/06/2022 04:48 PM       Current Infusion Rate: 1180 units/hr    Plan:  Bolus: 6800 units  Rate: 1430 units/hr  Next aPTT: 0300    Pharmacy will continue to monitor and adjust based on aPTT results.

## 2022-11-09 ENCOUNTER — APPOINTMENT (OUTPATIENT)
Dept: GENERAL RADIOLOGY | Age: 72
DRG: 133 | End: 2022-11-09
Payer: MEDICAID

## 2022-11-09 PROBLEM — R77.8 ELEVATED TROPONIN: Status: ACTIVE | Noted: 2022-11-09

## 2022-11-09 PROBLEM — N17.9 AKI (ACUTE KIDNEY INJURY) (HCC): Status: ACTIVE | Noted: 2022-11-09

## 2022-11-09 LAB
ALBUMIN SERPL-MCNC: 4.3 G/DL (ref 3.4–5)
ANION GAP SERPL CALCULATED.3IONS-SCNC: 14 MMOL/L (ref 3–16)
BASE EXCESS VENOUS: 2 MMOL/L
BASOPHILS ABSOLUTE: 0 K/UL (ref 0–0.2)
BASOPHILS RELATIVE PERCENT: 0.1 %
BUN BLDV-MCNC: 81 MG/DL (ref 7–20)
CALCIUM SERPL-MCNC: 9.5 MG/DL (ref 8.3–10.6)
CARBOXYHEMOGLOBIN: 0.9 %
CHLORIDE BLD-SCNC: 105 MMOL/L (ref 99–110)
CHOLESTEROL, TOTAL: 205 MG/DL (ref 0–199)
CO2: 27 MMOL/L (ref 21–32)
CREAT SERPL-MCNC: 2.1 MG/DL (ref 0.6–1.2)
EOSINOPHILS ABSOLUTE: 0 K/UL (ref 0–0.6)
EOSINOPHILS RELATIVE PERCENT: 0 %
ESTIMATED AVERAGE GLUCOSE: 102.5 MG/DL
GFR SERPL CREATININE-BSD FRML MDRD: 25 ML/MIN/{1.73_M2}
GLUCOSE BLD-MCNC: 112 MG/DL (ref 70–99)
GLUCOSE BLD-MCNC: 125 MG/DL (ref 70–99)
GLUCOSE BLD-MCNC: 148 MG/DL (ref 70–99)
GLUCOSE BLD-MCNC: 168 MG/DL (ref 70–99)
HBA1C MFR BLD: 5.2 %
HCO3 VENOUS: 29 MMOL/L (ref 23–29)
HCT VFR BLD CALC: 35.3 % (ref 36–48)
HDLC SERPL-MCNC: 35 MG/DL (ref 40–60)
HEMOGLOBIN: 11.8 G/DL (ref 12–16)
LDL CHOLESTEROL CALCULATED: ABNORMAL MG/DL
LDL CHOLESTEROL DIRECT: 103 MG/DL
LYMPHOCYTES ABSOLUTE: 0.4 K/UL (ref 1–5.1)
LYMPHOCYTES RELATIVE PERCENT: 4.3 %
MAGNESIUM: 2.4 MG/DL (ref 1.8–2.4)
MCH RBC QN AUTO: 31.1 PG (ref 26–34)
MCHC RBC AUTO-ENTMCNC: 33.4 G/DL (ref 31–36)
MCV RBC AUTO: 93 FL (ref 80–100)
METHEMOGLOBIN VENOUS: 0.6 %
MONOCYTES ABSOLUTE: 1 K/UL (ref 0–1.3)
MONOCYTES RELATIVE PERCENT: 10.4 %
NEUTROPHILS ABSOLUTE: 8.3 K/UL (ref 1.7–7.7)
NEUTROPHILS RELATIVE PERCENT: 85.2 %
O2 SAT, VEN: 90 %
O2 THERAPY: ABNORMAL
PCO2, VEN: 53.1 MMHG (ref 40–50)
PDW BLD-RTO: 13.6 % (ref 12.4–15.4)
PERFORMED ON: ABNORMAL
PH VENOUS: 7.34 (ref 7.35–7.45)
PHOSPHORUS: 3.7 MG/DL (ref 2.5–4.9)
PLATELET # BLD: 227 K/UL (ref 135–450)
PMV BLD AUTO: 8 FL (ref 5–10.5)
PO2, VEN: 58 MMHG
POTASSIUM SERPL-SCNC: 4.2 MMOL/L (ref 3.5–5.1)
RBC # BLD: 3.8 M/UL (ref 4–5.2)
SODIUM BLD-SCNC: 146 MMOL/L (ref 136–145)
TCO2 CALC VENOUS: 30 MMOL/L
TRIGL SERPL-MCNC: 433 MG/DL (ref 0–150)
VLDLC SERPL CALC-MCNC: ABNORMAL MG/DL
WBC # BLD: 9.7 K/UL (ref 4–11)

## 2022-11-09 PROCEDURE — APPNB15 APP NON BILLABLE TIME 0-15 MINS: Performed by: NURSE PRACTITIONER

## 2022-11-09 PROCEDURE — 97530 THERAPEUTIC ACTIVITIES: CPT

## 2022-11-09 PROCEDURE — 92610 EVALUATE SWALLOWING FUNCTION: CPT

## 2022-11-09 PROCEDURE — 82803 BLOOD GASES ANY COMBINATION: CPT

## 2022-11-09 PROCEDURE — 83721 ASSAY OF BLOOD LIPOPROTEIN: CPT

## 2022-11-09 PROCEDURE — 94668 MNPJ CHEST WALL SBSQ: CPT

## 2022-11-09 PROCEDURE — 2000000000 HC ICU R&B

## 2022-11-09 PROCEDURE — 83735 ASSAY OF MAGNESIUM: CPT

## 2022-11-09 PROCEDURE — 93880 EXTRACRANIAL BILAT STUDY: CPT

## 2022-11-09 PROCEDURE — 80069 RENAL FUNCTION PANEL: CPT

## 2022-11-09 PROCEDURE — 99233 SBSQ HOSP IP/OBS HIGH 50: CPT | Performed by: NURSE PRACTITIONER

## 2022-11-09 PROCEDURE — 94760 N-INVAS EAR/PLS OXIMETRY 1: CPT

## 2022-11-09 PROCEDURE — 97162 PT EVAL MOD COMPLEX 30 MIN: CPT

## 2022-11-09 PROCEDURE — 94667 MNPJ CHEST WALL 1ST: CPT

## 2022-11-09 PROCEDURE — 99233 SBSQ HOSP IP/OBS HIGH 50: CPT | Performed by: INTERNAL MEDICINE

## 2022-11-09 PROCEDURE — 6360000002 HC RX W HCPCS: Performed by: NURSE PRACTITIONER

## 2022-11-09 PROCEDURE — 2500000003 HC RX 250 WO HCPCS: Performed by: NURSE PRACTITIONER

## 2022-11-09 PROCEDURE — 97166 OT EVAL MOD COMPLEX 45 MIN: CPT

## 2022-11-09 PROCEDURE — 80061 LIPID PANEL: CPT

## 2022-11-09 PROCEDURE — 94640 AIRWAY INHALATION TREATMENT: CPT

## 2022-11-09 PROCEDURE — 2580000003 HC RX 258: Performed by: NURSE PRACTITIONER

## 2022-11-09 PROCEDURE — 83036 HEMOGLOBIN GLYCOSYLATED A1C: CPT

## 2022-11-09 PROCEDURE — 6370000000 HC RX 637 (ALT 250 FOR IP): Performed by: INTERNAL MEDICINE

## 2022-11-09 PROCEDURE — 6370000000 HC RX 637 (ALT 250 FOR IP): Performed by: NURSE PRACTITIONER

## 2022-11-09 PROCEDURE — 2700000000 HC OXYGEN THERAPY PER DAY

## 2022-11-09 PROCEDURE — 6370000000 HC RX 637 (ALT 250 FOR IP): Performed by: STUDENT IN AN ORGANIZED HEALTH CARE EDUCATION/TRAINING PROGRAM

## 2022-11-09 PROCEDURE — 85025 COMPLETE CBC W/AUTO DIFF WBC: CPT

## 2022-11-09 PROCEDURE — 94660 CPAP INITIATION&MGMT: CPT

## 2022-11-09 PROCEDURE — 6360000002 HC RX W HCPCS: Performed by: INTERNAL MEDICINE

## 2022-11-09 PROCEDURE — 2580000003 HC RX 258: Performed by: STUDENT IN AN ORGANIZED HEALTH CARE EDUCATION/TRAINING PROGRAM

## 2022-11-09 PROCEDURE — 36592 COLLECT BLOOD FROM PICC: CPT

## 2022-11-09 PROCEDURE — 71045 X-RAY EXAM CHEST 1 VIEW: CPT

## 2022-11-09 RX ORDER — BUDESONIDE 0.5 MG/2ML
0.5 INHALANT ORAL 2 TIMES DAILY
Status: DISCONTINUED | OUTPATIENT
Start: 2022-11-09 | End: 2022-11-11 | Stop reason: HOSPADM

## 2022-11-09 RX ORDER — SODIUM CHLORIDE FOR INHALATION 3 %
15 VIAL, NEBULIZER (ML) INHALATION 3 TIMES DAILY
Status: DISCONTINUED | OUTPATIENT
Start: 2022-11-09 | End: 2022-11-11 | Stop reason: HOSPADM

## 2022-11-09 RX ORDER — FUROSEMIDE 10 MG/ML
80 INJECTION INTRAMUSCULAR; INTRAVENOUS ONCE
Status: COMPLETED | OUTPATIENT
Start: 2022-11-09 | End: 2022-11-09

## 2022-11-09 RX ORDER — MORPHINE SULFATE 2 MG/ML
1 INJECTION, SOLUTION INTRAMUSCULAR; INTRAVENOUS EVERY 6 HOURS PRN
Status: DISCONTINUED | OUTPATIENT
Start: 2022-11-09 | End: 2022-11-10

## 2022-11-09 RX ORDER — IPRATROPIUM BROMIDE AND ALBUTEROL SULFATE 2.5; .5 MG/3ML; MG/3ML
1 SOLUTION RESPIRATORY (INHALATION) EVERY 4 HOURS
Status: DISCONTINUED | OUTPATIENT
Start: 2022-11-09 | End: 2022-11-09

## 2022-11-09 RX ORDER — LORAZEPAM 2 MG/ML
0.25 INJECTION INTRAMUSCULAR EVERY 4 HOURS PRN
Status: DISCONTINUED | OUTPATIENT
Start: 2022-11-09 | End: 2022-11-10

## 2022-11-09 RX ORDER — FENTANYL CITRATE 50 UG/ML
25 INJECTION, SOLUTION INTRAMUSCULAR; INTRAVENOUS
Status: DISCONTINUED | OUTPATIENT
Start: 2022-11-09 | End: 2022-11-11 | Stop reason: HOSPADM

## 2022-11-09 RX ORDER — CARVEDILOL 3.12 MG/1
3.12 TABLET ORAL 2 TIMES DAILY WITH MEALS
Status: DISCONTINUED | OUTPATIENT
Start: 2022-11-09 | End: 2022-11-11 | Stop reason: HOSPADM

## 2022-11-09 RX ORDER — IPRATROPIUM BROMIDE AND ALBUTEROL SULFATE 2.5; .5 MG/3ML; MG/3ML
1 SOLUTION RESPIRATORY (INHALATION)
Status: DISCONTINUED | OUTPATIENT
Start: 2022-11-10 | End: 2022-11-11 | Stop reason: HOSPADM

## 2022-11-09 RX ORDER — LORAZEPAM 2 MG/ML
0.25 INJECTION INTRAMUSCULAR ONCE
Status: COMPLETED | OUTPATIENT
Start: 2022-11-09 | End: 2022-11-09

## 2022-11-09 RX ADMIN — FUROSEMIDE 80 MG: 10 INJECTION, SOLUTION INTRAMUSCULAR; INTRAVENOUS at 09:53

## 2022-11-09 RX ADMIN — FENTANYL CITRATE 25 MCG: 0.05 INJECTION, SOLUTION INTRAMUSCULAR; INTRAVENOUS at 23:37

## 2022-11-09 RX ADMIN — BUDESONIDE 500 MCG: 0.5 SUSPENSION RESPIRATORY (INHALATION) at 19:35

## 2022-11-09 RX ADMIN — ASPIRIN 300 MG: 300 SUPPOSITORY RECTAL at 09:54

## 2022-11-09 RX ADMIN — MICONAZOLE NITRATE: 2 POWDER TOPICAL at 08:11

## 2022-11-09 RX ADMIN — IPRATROPIUM BROMIDE AND ALBUTEROL SULFATE 1 AMPULE: .5; 2.5 SOLUTION RESPIRATORY (INHALATION) at 15:59

## 2022-11-09 RX ADMIN — SODIUM CHLORIDE, PRESERVATIVE FREE 10 ML: 5 INJECTION INTRAVENOUS at 19:56

## 2022-11-09 RX ADMIN — FENTANYL CITRATE 25 MCG: 0.05 INJECTION, SOLUTION INTRAMUSCULAR; INTRAVENOUS at 19:54

## 2022-11-09 RX ADMIN — IPRATROPIUM BROMIDE AND ALBUTEROL SULFATE 1 AMPULE: .5; 2.5 SOLUTION RESPIRATORY (INHALATION) at 08:34

## 2022-11-09 RX ADMIN — LORAZEPAM 0.25 MG: 2 INJECTION INTRAMUSCULAR; INTRAVENOUS at 00:37

## 2022-11-09 RX ADMIN — Medication 20 MG: at 08:03

## 2022-11-09 RX ADMIN — DOXYCYCLINE 100 MG: 100 INJECTION, POWDER, LYOPHILIZED, FOR SOLUTION INTRAVENOUS at 08:13

## 2022-11-09 RX ADMIN — TIOTROPIUM BROMIDE INHALATION SPRAY 2 PUFF: 3.12 SPRAY, METERED RESPIRATORY (INHALATION) at 08:49

## 2022-11-09 RX ADMIN — SODIUM CHLORIDE, PRESERVATIVE FREE 10 ML: 5 INJECTION INTRAVENOUS at 08:05

## 2022-11-09 RX ADMIN — BUDESONIDE 500 MCG: 0.5 SUSPENSION RESPIRATORY (INHALATION) at 12:33

## 2022-11-09 RX ADMIN — SODIUM CHLORIDE SOLN NEBU 3% 15 ML: 3 NEBU SOLN at 12:33

## 2022-11-09 RX ADMIN — MICONAZOLE NITRATE: 2 POWDER TOPICAL at 20:00

## 2022-11-09 RX ADMIN — IPRATROPIUM BROMIDE AND ALBUTEROL SULFATE 1 AMPULE: .5; 2.5 SOLUTION RESPIRATORY (INHALATION) at 12:33

## 2022-11-09 RX ADMIN — METHYLPREDNISOLONE SODIUM SUCCINATE 40 MG: 40 INJECTION, POWDER, FOR SOLUTION INTRAMUSCULAR; INTRAVENOUS at 08:04

## 2022-11-09 RX ADMIN — LORAZEPAM 0.25 MG: 2 INJECTION INTRAMUSCULAR; INTRAVENOUS at 10:56

## 2022-11-09 RX ADMIN — DOXYCYCLINE 100 MG: 100 INJECTION, POWDER, LYOPHILIZED, FOR SOLUTION INTRAVENOUS at 21:28

## 2022-11-09 RX ADMIN — SODIUM CHLORIDE SOLN NEBU 3% 15 ML: 3 NEBU SOLN at 19:35

## 2022-11-09 RX ADMIN — FENTANYL CITRATE 25 MCG: 0.05 INJECTION, SOLUTION INTRAMUSCULAR; INTRAVENOUS at 15:33

## 2022-11-09 RX ADMIN — IPRATROPIUM BROMIDE AND ALBUTEROL SULFATE 1 AMPULE: .5; 2.5 SOLUTION RESPIRATORY (INHALATION) at 19:35

## 2022-11-09 RX ADMIN — MOMETASONE FUROATE AND FORMOTEROL FUMARATE DIHYDRATE 2 PUFF: 200; 5 AEROSOL RESPIRATORY (INHALATION) at 08:48

## 2022-11-09 ASSESSMENT — PAIN SCALES - GENERAL
PAINLEVEL_OUTOF10: 9
PAINLEVEL_OUTOF10: 10
PAINLEVEL_OUTOF10: 3
PAINLEVEL_OUTOF10: 0
PAINLEVEL_OUTOF10: 10
PAINLEVEL_OUTOF10: 8
PAINLEVEL_OUTOF10: 0

## 2022-11-09 ASSESSMENT — PAIN DESCRIPTION - ORIENTATION
ORIENTATION: MID
ORIENTATION: MID
ORIENTATION: LOWER;MID
ORIENTATION: MID

## 2022-11-09 ASSESSMENT — PAIN DESCRIPTION - DESCRIPTORS
DESCRIPTORS: ACHING;THROBBING
DESCRIPTORS: DISCOMFORT
DESCRIPTORS: ACHING;DISCOMFORT
DESCRIPTORS: ACHING

## 2022-11-09 ASSESSMENT — PAIN DESCRIPTION - LOCATION
LOCATION: BACK

## 2022-11-09 ASSESSMENT — PAIN DESCRIPTION - FREQUENCY
FREQUENCY: INTERMITTENT
FREQUENCY: CONTINUOUS

## 2022-11-09 ASSESSMENT — PAIN DESCRIPTION - PAIN TYPE
TYPE: CHRONIC PAIN

## 2022-11-09 ASSESSMENT — PAIN DESCRIPTION - ONSET
ONSET: ON-GOING
ONSET: ON-GOING

## 2022-11-09 ASSESSMENT — PAIN DESCRIPTION - DIRECTION
RADIATING_TOWARDS: CENTER
RADIATING_TOWARDS: CENTER

## 2022-11-09 ASSESSMENT — PAIN - FUNCTIONAL ASSESSMENT
PAIN_FUNCTIONAL_ASSESSMENT: PREVENTS OR INTERFERES SOME ACTIVE ACTIVITIES AND ADLS
PAIN_FUNCTIONAL_ASSESSMENT: ACTIVITIES ARE NOT PREVENTED

## 2022-11-09 NOTE — PLAN OF CARE
Problem: Discharge Planning  Goal: Discharge to home or other facility with appropriate resources  Outcome: Progressing     Problem: Skin/Tissue Integrity  Goal: Absence of new skin breakdown  Description: 1. Monitor for areas of redness and/or skin breakdown  2. Assess vascular access sites hourly  3. Every 4-6 hours minimum:  Change oxygen saturation probe site  4. Every 4-6 hours:  If on nasal continuous positive airway pressure, respiratory therapy assess nares and determine need for appliance change or resting period. Outcome: Progressing     Problem: ABCDS Injury Assessment  Goal: Absence of physical injury  Outcome: Progressing     Problem: Safety - Adult  Goal: Free from fall injury  Outcome: Progressing     Problem: Chronic Conditions and Co-morbidities  Goal: Patient's chronic conditions and co-morbidity symptoms are monitored and maintained or improved  Outcome: Progressing     Problem: Pain  Goal: Verbalizes/displays adequate comfort level or baseline comfort level  Outcome: Progressing     Problem: Safety - Medical Restraint  Goal: Remains free of injury from restraints (Restraint for Interference with Medical Device)  Description: INTERVENTIONS:  1. Determine that other, less restrictive measures have been tried or would not be effective before applying the restraint  2. Evaluate the patient's condition at the time of restraint application  3. Inform patient/family regarding the reason for restraint  4.  Q2H: Monitor safety, psychosocial status, comfort, nutrition and hydration  Outcome: Completed     Problem: Respiratory - Adult  Goal: Achieves optimal ventilation and oxygenation  Outcome: Progressing     Problem: Cardiovascular - Adult  Goal: Maintains optimal cardiac output and hemodynamic stability  Outcome: Progressing  Goal: Absence of cardiac dysrhythmias or at baseline  Outcome: Progressing     Problem: Metabolic/Fluid and Electrolytes - Adult  Goal: Electrolytes maintained within normal limits  Outcome: Progressing  Goal: Hemodynamic stability and optimal renal function maintained  Outcome: Progressing     Problem: Hematologic - Adult  Goal: Maintains hematologic stability  Outcome: Progressing     Problem: Neurosensory - Adult  Goal: Achieves stable or improved neurological status  Outcome: Progressing  Goal: Achieves maximal functionality and self care  Outcome: Progressing

## 2022-11-09 NOTE — PROGRESS NOTES
1945: Report received from [address] [place] [address]; bedside handoff and skin assessment complete. Pt is resting in bed; arouses easily. VSS. FIO2 99% on 4l. Will wean O2 as tolerated. F/C in place and draining. Pt's daughter at bedside  1954: Assessment complete--see flow sheets. [facility] 8; will follow. Pt c/o low back pain; will refer to MD  2015: Message sent to Dr. Lauryn Crandall re: pt complaints; new order received and pt updated on plan of care. Attempt to wean )2 to 2l not tolerated  2058: C/O inability to breath, SpO2 98%. Placed on Bipap per RT  2140: Bath complete; tolerated activity well. Bi pap removed per pt request; placed on 3l nasal canula  2200: Message sent to Dr Lauryn Crandall re: increased anxiety and pt complaint that she is unable to sleep. Pt is restless in bed and calling out frequently. Attempts at redirection successful for brief intervals  2245: New order received for low dose ativan; pt now sleeping; will reassess for need on waking  0037: Restless and anxious with assessments. Ativan given  for rest  0132: Pt found with oxygen removed, SpO2 68 %, continued to decline despite reapplication of O2. Placed on Bi pap  0135: SpO2 > 90%; tolerating well  0447: Awake in bed and yelling out; Bipap found prtially removed; placed on nasal canula 3l per pt request  0450: Doun d with O2 off; SpO2 dropping to 80's. O2 rep[laced and importance of O2 therapy discussed with pt. Respirations are tachypnic, breath sounds congested. RT asked to bring hi flow canula to bedside. CXR ordered for this am  0500: Yelling out; c/o inabilty to breath, anxious. Placed back on Bi pap  0715: report given to Minh Boss. Handoff complete. Pt resting comfortably on Bi pap.  VSS

## 2022-11-09 NOTE — PROGRESS NOTES
Physical Therapy  Facility/Department: [facility]  Physical Therapy Initial Assessment - [facility]    Name: Daija Warner  : 1950  MRN: [id number]  Date of Service: 2022    Discharge Recommendations:  Patient would benefit from continued therapy after discharge, IP Rehab   PT Equipment Recommendations  Equipment Needed: No      Patient Diagnosis(es): The primary encounter diagnosis was Congestive heart failure, unspecified HF chronicity, unspecified heart failure type ([facility] Diagnoses of Acute hypoxemic respiratory failure (HCC), Pulmonary edema with congestive heart failure [facility]), NSTEMI (non-ST elevated myocardial infarction) ([facility] and Pulmonary nodule were also pertinent to this visit. Past Medical History:  has a past medical history of COPD, mild ([facility] Depression, Hypercholesteremia, and Hypertension. Past Surgical History:  has a past surgical history that includes Hysterectomy; Cholecystectomy;  section; Appendectomy; and Kidney removal.    Assessment   Body Structures, Functions, Activity Limitations Requiring Skilled Therapeutic Intervention: Decreased functional mobility ; Decreased cognition;Decreased strength;Decreased balance;Decreased safe awareness  Assessment: Pt is a 71 y.o. F. admitted  for SOB, cardiopulmonary arrest, subsequently intubated. She was found to have L facial droop. CVA confirmed. She presents with dysphagia, and is fixated on eating/drinking (NPO) having difficulty following commands initially for mobility tasks. In spite of this, she was able to complete bed mobilty with Mod A, and stand to stedy with CGA. Standing tolerance limited by fatigue, but she did well to participate. Recommend continued therapy at high frequency to improve strength, balance, endurance, safety awareness, and independence transferring/ambulating. Daija Warner scored a 10/24 on the AM-PAC short mobility form.  Current research shows that an AM-PAC score of 17 or less is typically not associated with a discharge to the patient's home setting. Based on the patient's AM-PAC score and their current functional mobility deficits, it is recommended that the patient have 5-7 sessions per week of Physical Therapy at d/c to increase the patient's independence. At this time, this patient demonstrates complex nursing, medical, and rehabilitative needs, and would benefit from intensive rehabilitation services upon discharge from the Inpatient setting. This patient demonstrates the ability to participate in and benefit from an intensive therapy program with a coordinated interdisciplinary team approach to foster frequent, structured, and documented communication among disciplines, who will work together to establish, prioritize, and achieve treatment goals. Please see assessment section for further patient specific details. If patient discharges prior to next session this note will serve as a discharge summary. Please see below for the latest assessment towards goals. Specific Instructions for Next Treatment: improve strength, balance, endurance, safety awareness, independence ambulating  Therapy Prognosis: Good  Decision Making: Medium Complexity  History: See below  Exam: Strength; ROM; Balance; Transfers  Clinical Presentation: Evolving  Barriers to Learning: Confusion; Agitation;  Fatigue  Requires PT Follow-Up: Yes  Activity Tolerance  Activity Tolerance: Patient tolerated evaluation without incident;Treatment limited secondary to decreased cognition     Plan   Physcial Therapy Plan  General Plan: 5-7 times per week  Specific Instructions for Next Treatment: improve strength, balance, endurance, safety awareness, independence ambulating  Current Treatment Recommendations: Strengthening, Balance training, Gait training, Stair training, Functional mobility training, Neuromuscular re-education, Transfer training, ADL/Self-care training, Equipment evaluation, education, & procurement, Patient/Caregiver education & training, Safety education & training, Endurance training, Cognitive/Perceptual training  Safety Devices  Type of Devices: All fall risk precautions in place, Call light within reach, Left in chair, Nurse notified  Restraints  Restraints Initially in Place: No     Restrictions  Restrictions/Precautions  Restrictions/Precautions: Fall Risk  Position Activity Restriction  Other position/activity restrictions: 5L O2 via NC; NPO     Subjective   General  Chart Reviewed: Yes  Patient assessed for rehabilitation services?: Yes  Additional Pertinent Hx: Koko Lopez is a 71 y.o. female who presents to the ED for shortness of breath. Patient reports 4 days of worsening shortness of breath that became severe tonight. Her neighbor called EMS on her behalf. She denies any chest pain at any time throughout this. She denies also any fevers, chills or sweats. No vomiting or diarrhea. No significant URI symptoms. She denies also any history of heart failure. She states she has history of COPD and used to be on oxygen but is no longer. Response To Previous Treatment: Not applicable  Referring Practitioner: Dr. Kylie Pineda  Referral Date : 11/08/22  Diagnosis: Cardiopulmonary arrest; intubation; CVA  Follows Commands: Impaired  Subjective  Subjective: Pt fixated on eating and drinking (NPO). Speech is slurred. Difficult to understand. Social/Functional History  Social/Functional History  Lives With: Alone  Type of Home: Apartment  Home Layout: One level  Home Access: Elevator  Bathroom Shower/Tub: Tub/Shower unit  Bathroom Toilet: Standard  Home Equipment:  (CPAP;  Home O2)  Has the patient had two or more falls in the past year or any fall with injury in the past year?: Yes  ADL Assistance: Independent  Ambulation Assistance: Independent  Transfer Assistance: Independent  Active : No  Patient's  Info: Transport service    [address]: Impaired  Vision Exceptions: Wears glasses at all times  Hearing  Hearing: Within functional limits      Cognition   Orientation  Overall Orientation Status:  (difficult to assess; pt is perseverating on food/water)     Objective     AROM RLE (degrees)  RLE AROM: WFL  AROM LLE (degrees)  LLE AROM : WFL  Strength RLE  Comment: hip Flex, Knee Flex/Ext mildly decraeased  Strength LLE  Comment: hip Flex, Knee Flex/Ext mildly decraeased       Bed mobility  Supine to Sit: Moderate assistance (Simultaneous filing. User may not have seen previous data.)    Transfers  Sit to Stand: Contact guard assistance (To stedy; Able to stand to walker with CGA x 2)  Stand to Sit: Contact guard assistance (From stedy)  Bed to Chair: Dependent/Total (Using stedy)     Balance  Comments: Min-CGA to maintain standing balance while marching in place with walker support. Exercise Treatment: March in place x 10 with walker support, CGA x 2. AM-PAC Score  AM-PAC Inpatient Mobility Raw Score : 10 (11/09/22 1018)  AM-PAC Inpatient T-Scale Score : 32.29 (11/09/22 1018)  Mobility Inpatient CMS 0-100% Score: 76.75 (11/09/22 1018)  Mobility Inpatient CMS G-Code Modifier : CL (11/09/22 1018)    Goals  Short Term Goals  Time Frame for Short Term Goals: 2-3 days  Short Term Goal 1: Bed mobility min A  Short Term Goal 2: Transfers SBA  Short Term Goal 3: Ambulation 22' with walker, Min A  Patient Goals   Patient Goals : To return home when able       Education  Patient Education  Education Given To: Patient; Family  Education Provided: Role of Therapy;Plan of Care; Fall Prevention Strategies;Transfer Training  Education Method: Demonstration;Verbal  Barriers to Learning: Cognition  Education Outcome: Continued education needed      Therapy Time   Individual Concurrent Group Co-treatment   Time In       0940   Time Out       1020   Minutes       40   Timed Code Treatment Minutes: 25 Minutes   Medium Complexity Evaluation    Abe Marquez PT   Electronically signed by Ban Nguyễn, PT [id number] on 11/9/2022 at 10:26 AM

## 2022-11-09 NOTE — CARE COORDINATION
Discharge Planning:    Attempted to speak with patient at bedside, but she was sleeping soundly. RR WDL. Will re-attempt later as time allows. No family at bedside at time of visit. Call placed to [facility], patient's daughter to complete initial case management assessment. Left message requesting return call. Awaiting response at this time.     Electronically signed by Isis Smith RN on 11/9/2022 at 3:10 PM

## 2022-11-09 NOTE — PLAN OF CARE
Problem: Discharge Planning  Goal: Discharge to home or other facility with appropriate resources  11/9/2022 0912 by Traci Llanos RN  Outcome: Progressing  11/9/2022 0319 by Jacinto Shukla RN  Outcome: Progressing     Problem: Skin/Tissue Integrity  Goal: Absence of new skin breakdown  Description: 1. Monitor for areas of redness and/or skin breakdown  2. Assess vascular access sites hourly  3. Every 4-6 hours minimum:  Change oxygen saturation probe site  4. Every 4-6 hours:  If on nasal continuous positive airway pressure, respiratory therapy assess nares and determine need for appliance change or resting period.   11/9/2022 0912 by Traci Llanos RN  Outcome: Progressing  11/9/2022 0319 by Jacinto Shukla RN  Outcome: Progressing     Problem: ABCDS Injury Assessment  Goal: Absence of physical injury  11/9/2022 0912 by Traci Llanos RN  Outcome: Progressing  11/9/2022 0319 by Jacinto Shukla RN  Outcome: Progressing     Problem: Safety - Adult  Goal: Free from fall injury  11/9/2022 0912 by Traci Llanos RN  Outcome: Progressing  11/9/2022 0319 by Jacinto Shukla RN  Outcome: Progressing     Problem: Chronic Conditions and Co-morbidities  Goal: Patient's chronic conditions and co-morbidity symptoms are monitored and maintained or improved  11/9/2022 0912 by Traci Llanos RN  Outcome: Progressing  11/9/2022 0319 by Jacinto Shukla RN  Outcome: Progressing     Problem: Pain  Goal: Verbalizes/displays adequate comfort level or baseline comfort level  11/9/2022 0912 by Traci Llanos RN  Outcome: Progressing  11/9/2022 0319 by Jacinto Shukla RN  Outcome: Progressing     Problem: Respiratory - Adult  Goal: Achieves optimal ventilation and oxygenation  11/9/2022 0912 by Traci Llanos RN  Outcome: Progressing  11/9/2022 0319 by Jacinto Shukla RN  Outcome: Progressing     Problem: Cardiovascular - Adult  Goal: Maintains optimal cardiac output and hemodynamic stability  11/9/2022 0912 by Romain Hugo Junior Duffy RN  Outcome: Progressing  11/9/2022 0319 by Low Hicks RN  Outcome: Progressing  Goal: Absence of cardiac dysrhythmias or at baseline  11/9/2022 0912 by Ifrah Diaz RN  Outcome: Progressing  11/9/2022 0319 by Low Hicks RN  Outcome: Progressing     Problem: Metabolic/Fluid and Electrolytes - Adult  Goal: Electrolytes maintained within normal limits  11/9/2022 0912 by Ifrah Diaz RN  Outcome: Progressing  11/9/2022 0319 by Low Hicks RN  Outcome: Progressing  Goal: Hemodynamic stability and optimal renal function maintained  11/9/2022 0912 by Ifrah Diaz RN  Outcome: Progressing  11/9/2022 0319 by Low Hicks RN  Outcome: Progressing     Problem: Hematologic - Adult  Goal: Maintains hematologic stability  11/9/2022 0912 by Ifrah Diaz RN  Outcome: Progressing  11/9/2022 0319 by Low Hicks RN  Outcome: Progressing     Problem: Neurosensory - Adult  Goal: Achieves stable or improved neurological status  11/9/2022 0912 by Ifrah Diaz RN  Outcome: Progressing  11/9/2022 0319 by Low Hicks RN  Outcome: Progressing  Goal: Achieves maximal functionality and self care  11/9/2022 0912 by Ifrah Diaz RN  Outcome: Progressing  11/9/2022 0319 by Low Hicks RN  Outcome: Progressing

## 2022-11-09 NOTE — PROGRESS NOTES
Pulmonary Progress Note    Date of Admission: 11/6/2022   LOS: 3 days     CC:  Chief Complaint   Patient presents with    Shortness of Breath           Assessment/Plan     Cardiac arrest  -Bradycardic event then lost pulses  -ROSC within 8 minutes  -Awake and following commands     Acute on chronic hypoxemic respiratory failure with SPO2 less than 90%  -Successfully extubated yesterday  -reMains on supplemental oxygen  -Airway clearance, Acapella/I-S if able, 3% nebs  -PT/OT     Acute exacerbation of COPD  -Pulmicort, duo nebs Daliresp  -Daily Solu-Medrol    CVA  -Neurology following    Peridex  Pepcid  Heparin drip for possible NSTEMI    Patient is a high risk due to tenuous respiratory status    HPI/Subjective  Overnight waxing and waning mental status and oxygen requirements. Weak cough today    ROS:   No nausea  No Vomiting  No chest pain      Intake/Output Summary (Last 24 hours) at 11/9/2022 0842  Last data filed at 11/9/2022 0526  Gross per 24 hour   Intake 372.61 ml   Output 1700 ml   Net -1327.39 ml         PHYSICAL EXAM:   Blood pressure 129/72, pulse 86, temperature 97.7 °F (36.5 °C), temperature source Axillary, resp. rate 25, height 5' 3\" (1.6 m), weight 172 lb 2.9 oz (78.1 kg), SpO2 99 %, not currently breastfeeding.'  Gen:  No acute distress. Eyes: PERRL. Anicteric sclera. No conjunctival injection. ENT: No discharge. Posterior oropharynx clear. External appearance of ears and nose normal.  Neck: Trachea midline. No mass   Resp:  No crackles. No wheezes. No rhonchi. No dullness on percussion. CV: Regular rate. Regular rhythm. No murmur or rub. No edema. GI: Soft, Non-tender. Non-distended. +BS  Skin: Warm, dry, w/o erythema. Lymph: No cervical or supraclavicular LAD. M/S: No cyanosis. No clubbing. Neuro:  no focal neurologic deficit. Moves all extremities  Psych: Awake and alert, Oriented x 3. Judgement and insight appropriate. Mood stable.       Medications:    Scheduled Meds: nicotine  1 patch TransDERmal Daily    aspirin  81 mg Oral Daily    Or    aspirin  300 mg Rectal Daily    atorvastatin  40 mg Oral Nightly    lidocaine  1 patch TransDERmal Daily    doxycycline (VIBRAMYCIN) IV  100 mg IntraVENous Q12H    methylPREDNISolone  40 mg IntraVENous Daily    famotidine (PEPCID) injection  20 mg IntraVENous Daily    Roflumilast  500 mcg Oral Daily    mometasone-formoterol  2 puff Inhalation BID    tiotropium  2 puff Inhalation Daily    sodium chloride flush  5-40 mL IntraVENous 2 times per day    [Held by provider] furosemide  40 mg IntraVENous BID    miconazole   Topical BID       Continuous Infusions:   sodium chloride         PRN Meds:  traMADol, ondansetron **OR** ondansetron, polyethylene glycol, perflutren lipid microspheres, sodium chloride flush, sodium chloride, acetaminophen **OR** acetaminophen, perflutren lipid microspheres, guaiFENesin-dextromethorphan, ipratropium-albuterol, clonazePAM    Labs reviewed:  CBC:   Recent Labs     11/07/22  0450 11/08/22 0533 11/09/22  0450   WBC 10.8 9.5 9.7   HGB 11.5* 11.0* 11.8*   HCT 33.8* 32.4* 35.3*   MCV 92.6 92.5 93.0    209 227     BMP:   Recent Labs     11/07/22  0450 11/08/22  0533 11/09/22  0450    143 146*   K 4.3 3.4* 4.2   CL 98* 102 105   CO2 23 23 27   PHOS 4.0 3.9 3.7   BUN 51* 82* 81*   CREATININE 2.5* 2.6* 2.1*     LIVER PROFILE:   Recent Labs     11/06/22  1647   *   *   BILITOT 0.5   ALKPHOS 181*     PT/INR:   Recent Labs     11/06/22  1648   PROTIME 13.7   INR 1.06     APTT:   Recent Labs     11/07/22  1123 11/07/22 2017 11/08/22  0238   APTT 142.7* 58.4* >180.0*     UA:No results for input(s): NITRITE, COLORU, PHUR, LABCAST, WBCUA, RBCUA, MUCUS, TRICHOMONAS, YEAST, BACTERIA, CLARITYU, SPECGRAV, LEUKOCYTESUR, UROBILINOGEN, BILIRUBINUR, BLOODU, GLUCOSEU, AMORPHOUS in the last 72 hours.     Invalid input(s): [place]  No results for input(s): PH, PCO2, PO2 in the last 72 hours.      Films:  Radiology Review:  Pertinent images / reports were reviewed as a part of this visit. XR CHEST PORTABLE  Narrative: EXAMINATION:  ONE XRAY VIEW OF THE CHEST    11/9/2022 5:06 am    COMPARISON:  11/06/2022    HISTORY:  ORDERING SYSTEM PROVIDED HISTORY: increase work of breathing  TECHNOLOGIST PROVIDED HISTORY:  Reason for exam:->increase work of breathing  Reason for Exam: increase work of breathing    FINDINGS:  The endotracheal tube and nasogastric tube have been removed. Right PICC  line is over the expected SVC with the patient is very rotated to the right. There are residual opacities in the mid to lower right lung which appear more  than the left side. The could be some decreasing volume and increasing  atelectasis in the right lower chest.  Pulmonary vascular congestion remains. A calcified granuloma in the right upper lobe is still present. No  pneumothorax is identified. Impression: Endotracheal tube and nasogastric tube have been removed. Right PICC line is  still present. Patient is rotated to the right distorting the mediastinum and grecia. There  does appear to be increasing opacities in the right lower chest and possible  volume loss. May represent a combination of edema and atelectasis. Access  CVC   Arterial          PICC        PICC Triple Lumen 11/06/22 Right Brachial (Active)   Central Line Being Utilized Yes 11/09/22 0800   Criteria for Appropriate Use Hemodynamically unstable, requiring monitoring lines, vasopressors, or volume resuscitation 11/09/22 0800   Site Assessment Clean, dry & intact 11/09/22 0800   Phlebitis Assessment No symptoms 11/09/22 0800   Infiltration Assessment 0 11/09/22 0800   Extremity Circumference (cm) 31 cm 11/06/22 1926   External Catheter Length (cm) 0 cm 11/06/22 1926   Proximal Lumen Color/Status Red; Infusing;Capped;Flushed;Blood return noted 11/09/22 0800   Medial Lumen Status White;Normal saline locked; Capped;Flushed;Blood return noted 11/09/22 0800   Distal Lumen Color/Status Gray;Normal saline locked; Capped;Flushed;Blood return noted 11/09/22 0800   Line Care Connections checked and tightened;Cap changed 11/09/22 0800   Alcohol Cap Used Yes 11/09/22 0800   Date of Last Dressing Change 11/06/22 11/09/22 0800   Dressing Type Transparent; Antimicrobial;Securing device 11/09/22 0800   Dressing Status Clean, dry & intact 11/09/22 0800   Dressing Intervention New 11/09/22 0430   Number of days: 2       CVC                  Joy  Urinary Catheter 11/06/22 Joy (Active)   Catheter Indications Need for fluid volume management of the critically ill patient in a critical care setting 11/09/22 0800   Site Assessment No urethral drainage 11/09/22 0800   Urine Color Yellow 11/09/22 0800   Urine Appearance Clear 11/09/22 0800   Urine Odor Other (Comment) 11/09/22 0800   Collection Container Standard 11/09/22 0800   Securement Method Securing device (Describe) 11/09/22 0800   Catheter Care Completed Yes 11/08/22 1954   Catheter Best Practices  Drainage tube clipped to bed;Catheter secured to thigh; Tamper seal intact; Bag below bladder;Bag not on floor; Lack of dependent loop in tubing;Drainage bag less than half full 11/09/22 0800   Status Draining;Patent 11/09/22 0800   Output (mL) 150 mL 11/09/22 0526   Number of days: 2         Thank you for this consult,    Mo Yepez MD  [facility] Pulmonary, Critical Care, and Sleep Medicine

## 2022-11-09 NOTE — PROGRESS NOTES
Facility/Department: [address] ICU  Initial Assessment  DYSPHAGIA BEDSIDE SWALLOW EVALUATION     Patient: Otilio Suarez   : 1950   MRN: [id number]      Evaluation Date: 2022   Admitting Diagnosis: Pulmonary nodule [R91.1]  [facility] (non[facility] elevated [facility]) [facility] [I21.4]  Pulmonary edema with congestive heart failure (HCC) [I50.1]  Acute hypoxemic respiratory failure (HCC) [J96.01]  Congestive heart failure, unspecified HF chronicity, unspecified heart failure type ([facility] [I50.9]  Pain: Did not state                                                       Chart Review:   H&P:   Pt is a 72 y/o female admitted with shortness of breath/acute respiratory with hypoxia and hypercapnia with subsequent intubation, cardiac arrest, COPD with acute exacerbation, PEYTON, chronic diastolic heart failure, lung nodues. Upon extubation pt noted to have left sided facial droop, stroke protocol initiated and pt found to have acute CVA. See imaging results for details. Current Diet Level (prior to evaluation): NPO  NPO   Respiratory Status:   []Room Air   [x]O2 via nasal cannula 4L   []Other:    Imaging:  Chest X-ray: 2022  Impression   Endotracheal tube and nasogastric tube have been removed. Right PICC line is   still present. Patient is rotated to the right distorting the mediastinum and grecia. There   does appear to be increasing opacities in the right lower chest and possible   volume loss. May represent a combination of edema and atelectasis. Head CT: 2022  Impression   Findings suggestive of right MCA distribution stroke. Consider CT angiogram   or MRI as clinically warranted. Negative for acute bleed midline shift mass effect. Brain MRI: 2022  Impression   Severely motion degraded exam.       Acute infarct within the right frontal lobe as well as acute microinfarcts   within the left occipital lobe.          Modified Barium Swallow Study: n/a per EMR review    Reason for referral: SLP evaluation orders received due to CVA protocol     History/Prior Level of Function:   Living Status: pt reports she lives independently  Baseline diet: pt reports she ate regular/thin prior to hospitalization  Prior Dysphagia History: n/a per pt report and EMR review    Dysphagia Impressions/Diagnosis: Oropharyngeal Dysphagia   OROPHARYNGEAL DYSPHAGIA DIAGNOSIS:   Pt awake, in bed, crying for something to drink. Pt presents with mod-severe oropharyngeal dysphagia characterized by reduced bolus control, anterior spillage d/t reduced labial seal, significant left sided facial droop, weakness and reduced ROM, suspected delayed swallow initiation, suspected reduced laryngeal elevation. Oral motor exam revealed pt to be fully edentulous. Pt with significant left sided facial droop at rest. Pt demonstrated significantly reduced lingual/labial strength, ROM and coordination for OM exam, however this was somewhat improved with automatic movement during PO intake. Pt with moderate amount of saliva in oral cavity with mild left sided anterior spillage noted prior to any PO intake. Respirations sounded wet at baseline, however vocal quality appeared clear. Speech was dysarthric. Pt able to elicit weak volitional throat clear and volitional dry swallow. Laryngeal elevation appeared mildly reduced and delayed upon palpation, however this is subjective during a bedside assessment. Pt trialed with x2 single ice chips with prolonged oral phase, however this appeared to be behavioral. No overt s/s aspiration or changes in vocal quality noted. Pt given x2 sips of thin water via spoon with significant amount of anterior spillage with first presentation. Second presentation appeared better controlled, however mild spillage again noted. No overt s/s aspiration were noted and no changes in vocal quality observed. Pureed solids trialed x2 bites with adequate oral clearance and no overt s/s aspiration. Labial seal around spoon was reduced for all presentations. Recommend continue NPO at this time given pt current respiratory status. Order received for MBS with plan to complete 11/10/22 if pt is respiratory stable. 2.  MEDICAL OR COGNITIVE/BEHAVIORAL FACTORS WHICH CAN EXACERBATE:   Pt very anxious and tearful for PO intake, education completed with pt regarding rationale for NPO with MBS to be completed. Pt expressed understanding but demonstrated no carryover of information given one minute interval.  Pt also at risk for aspiration of own secretions. Recommendation given to nurseAlice for thorough/regular oral care to reduce the risk of aspiration pneumonia. Recommended Diet and Intervention 11/9/2022:  Diet Solids Recommendation:  NPO  Liquid Consistency Recommendation:  NPO Recommended form of Meds: Meds via alternative means      Recommend completion of Modified Barium Swallow study to further assess pharyngeal phase of swallow     Compensatory Swallowing Strategies: To be determined     SHORT TERM DYSPHAGIA GOALS/PLAN OF CARE: Speech therapy for dysphagia tx 3-5 times per week during acute care stay. Goals  Pt will functionally tolerate ongoing assessment of swallow function with diet to be determined as indicated   Pt will demonstrate understanding of aspiration risk and precautions via education/demonstration with occasional prompting  Pt will advance to least restrictive diet as indicated   Pt will complete speech/language/cognitive assessment as able with additional goals to follow.     Dysphagia Therapeutic Intervention:  Patient/Family Education , Instrumental assessment of swallow function (Modified Barium Swallow Study)     Dysphagia Prognosis: [] good []fair  [x]guarded []poor  Barriers to progress: reduced insight, increased impulsivity, reduced carryover/understanding    Discharge Recommendations: Recommend ongoing SLP for speech and dysphagia therapy upon discharge from hospital TEST DATA  Vision: [facility] for swallow assessment  Hearing: [facility] for swallow assessment    Consistencies Presented:   Ice chips; Thin liquid (tsp sips water); Puree food (pudding)    Cognitive/behavioral:   [x]orientation [x] to self [] place [] date [] reason for admit [] purpose of evaluation  [x]distractible  [x]verbally responsive  [x]follows one step commands  []agitated  [x]impulsive  [] other:     Patient Positioning: Upright in bed     Dentition:  []Adequate  []Dentures   []Missing Many Teeth  [x]Edentulous  []Other:    Baseline Vocal Quality:  [x]Normal  []Dysphonic   []Aphonic   []Hoarse  []Wet  []Weak  [x]Other: audible respirations with and without PO intake.     Volitional Cough:  []Strong  [x]Weak  []Wet  []Absent  []Congested  []Other:    Volitional Swallow:   []Absent   []Delayed     [x]Adequate     []Required use of drink     Oral Mechanism Exam:  []WFL []Mild   [x] Moderate  [x]Severe  []To be assessed  Impaired:   [x]Left side      []Right side    [x]Labial ROM/Coordination    [x]Labial Symmetry   [x]Lingual ROM/Coordination   [x]Lingual Symmetry  []Gag  []Other:     Oral Phase: []WFL []Mild   [x] Moderate  [x]Severe  []To be assessed   []Impaired/Prolonged Mastication:   [x]Spillage Left:   []Spillage Right:  []Pocketing Left:   []Pocketing Right:   []Decreased Anterior to Posterior Transit:   [x]Suspected Premature Bolus Loss:   []Lingual/Palatal Residue:   []Other:     Pharyngeal Phase: []WFL [x]Mild   [x] Moderate  []Severe  []To be assessed   [x]Delayed Swallow: suspected   [x]Suspected Pharyngeal Pooling:   [x]Decreased Laryngeal Elevation: suspected   []Absent Swallow:  []Wet Vocal Quality:   []Throat Clearing-Immediate:   []Throat Clearing-Delayed:   []Cough-Immediate:   []Cough-Delayed:  []Change in Vital Signs:  []Suspected Delayed Pharyngeal Clearing:  []Other:     Eating Assistance:  []Independent  []Setup or clean-up assistance   [] Supervision or touching assistance   [] Partial or moderate assistance   [x] Substantial or maximal assistance  [] Dependent     EDUCATION:   Provided education regarding role of SLP, results of assessment, recommendations and general speech pathology plan of care. [] Pt verbalized understanding and agreement   [x] Pt requires ongoing learning   [] No evidence of comprehension     If patient discharges prior to next visit, this note will serve as discharge.      Timed Code Minutes: 15  Total Treatment Minutes: 15    Time in: 4587  Time out: [address]    Electronically signed by:   Ria Mckeon MA [address] [place] [address] [address][id number]  Speech-Language Pathologist  11/09/22  1:13pm

## 2022-11-09 NOTE — PROGRESS NOTES
Progress Note  Admit Date: 11/6/2022      PCP: Tayler Palm MD     CC: F/U for shortness of breath    Days in hospital:  3    SUBJECTIVE / Interval History:  Extubated. Lots of anxiety  Wants to eat  Audible congestion      Allergies  Patient has no known allergies. Medications    Scheduled Meds:   ipratropium-albuterol  1 ampule Inhalation Q4H    budesonide  0.5 mg Nebulization BID    sodium chloride (Inhalant)  15 mL Nebulization TID    carvedilol  3.125 mg Oral BID WC    nicotine  1 patch TransDERmal Daily    aspirin  81 mg Oral Daily    Or    aspirin  300 mg Rectal Daily    atorvastatin  40 mg Oral Nightly    lidocaine  1 patch TransDERmal Daily    doxycycline (VIBRAMYCIN) IV  100 mg IntraVENous Q12H    methylPREDNISolone  40 mg IntraVENous Daily    famotidine (PEPCID) injection  20 mg IntraVENous Daily    Roflumilast  500 mcg Oral Daily    sodium chloride flush  5-40 mL IntraVENous 2 times per day    [Held by provider] furosemide  40 mg IntraVENous BID    miconazole   Topical BID     Continuous Infusions:   sodium chloride         PRN Meds:  traMADol, ondansetron **OR** ondansetron, polyethylene glycol, perflutren lipid microspheres, sodium chloride flush, sodium chloride, acetaminophen **OR** acetaminophen, perflutren lipid microspheres, guaiFENesin-dextromethorphan, ipratropium-albuterol, clonazePAM    Vitals    /70   Pulse 98   Temp 97.3 °F (36.3 °C) (Axillary)   Resp (!) 35   Ht 5' 3\" (1.6 m)   Wt 172 lb 2.9 oz (78.1 kg)   SpO2 100%   BMI 30.50 kg/m²     Exam:    Gen: anxious  Eyes: PERRL. No sclera icterus. No conjunctival injection. ENT: No discharge. Pharynx clear. External appearance of ears and nose normal.  Neck: Trachea midline. No obvious mass. Resp: course rhonch  CV: Regular rate. Regular rhythm. No murmur or rub. No edema. GI: Non-tender. Non-distended. No hernia. Skin: Warm, dry, normal texture and turgor. No nodule on exposed extremities.    Lymph: No cervical LAD. No supraclavicular LAD. M/S: No cyanosis. No clubbing. No joint deformity. Neuro: moving arms and legs. Dysarthric speech. Left facial droop. Left sided weakness  Psych: anxious     Data    LABS  CBC:   Recent Labs     11/07/22 0450 11/08/22 0533 11/09/22  0450   WBC 10.8 9.5 9.7   HGB 11.5* 11.0* 11.8*   HCT 33.8* 32.4* 35.3*   MCV 92.6 92.5 93.0    209 227       BMP:   Recent Labs     11/07/22 0450 11/08/22  0533 11/09/22  0450    143 146*   K 4.3 3.4* 4.2   CL 98* 102 105   CO2 23 23 27   PHOS 4.0 3.9 3.7   BUN 51* 82* 81*   CREATININE 2.5* 2.6* 2.1*   GLUCOSE 120* 100* 112*       POC GLUCOSE:    Recent Labs     11/07/22 2017 11/08/22  0533 11/08/22  1711 11/09/22  0810 11/09/22  1213   POCGLU 112* 115* 141* 125* 148*       LIVER PROFILE:   Recent Labs     11/06/22  1647 11/07/22  0450 11/08/22  0533 11/09/22  0450   *  --   --   --    *  --   --   --    LABALBU 4.2 3.7 3.5 4.3   BILITOT 0.5  --   --   --    ALKPHOS 181*  --   --   --        PT/INR:   Recent Labs     11/06/22  1648   PROTIME 13.7   INR 1.06       APTT:   Recent Labs     11/07/22  1123 11/07/22 2017 11/08/22  0238   APTT 142.7* 58.4* >180.0*       UA:No results for input(s): NITRITE, COLORU, PHUR, LABCAST, WBCUA, RBCUA, MUCUS, TRICHOMONAS, YEAST, BACTERIA, CLARITYU, SPECGRAV, LEUKOCYTESUR, UROBILINOGEN, BILIRUBINUR, BLOODU, GLUCOSEU, KETUA, AMORPHOUS in the last 72 hours. Microbiology:  Wound Culture: No results for input(s): WNDABS, ORG in the last 72 hours. Invalid input(s):  LABGRAM  Nasal Culture: No results for input(s): ORG, MRSAPCR in the last 72 hours. Blood Culture: No results for input(s): BC, BLOODCULT2 in the last 72 hours. Fungal Culture:   No results for input(s): FUNGSM in the last 72 hours. No results for input(s): FUNCXBLD in the last 72 hours.   CSF Culture:  No results for input(s): COLORCSF, APPEARCSF, CFTUBE, CLOTCSF, WBCCSF, RBCCSF, NEUTCSF, NUMCELLSCSF, LYMPHSCSF, MONOCSF, GLUCCSF, VOLCSF in the last 72 hours. Respiratory Culture:  No results for input(s): [place] Spittle in the last 72 hours. AFB:No results for input(s): AFBSMEAR in the last 72 hours. Urine Culture  No results for input(s): LABURIN in the last 72 hours. RADIOLOGY:    XR CHEST PORTABLE   Final Result   Endotracheal tube and nasogastric tube have been removed. Right PICC line is   still present. Patient is rotated to the right distorting the mediastinum and grecia. There   does appear to be increasing opacities in the right lower chest and possible   volume loss. May represent a combination of edema and atelectasis. MRI BRAIN WO CONTRAST   Final Result   Severely motion degraded exam.      Acute infarct within the right frontal lobe as well as acute microinfarcts   within the left occipital lobe. The findings were sent to the Radiology Results [address] at 6:26   pm on 11/8/2022 to be communicated to a licensed caregiver. CT HEAD WO CONTRAST   Final Result   Addendum (preliminary) 1 of 1   ADDENDUM:   Critical results were called by Dr. Avila Robles to Jhonny on 11/8/2022 at    18:01. Final   Findings suggestive of right MCA distribution stroke. Consider CT angiogram   or MRI as clinically warranted. Negative for acute bleed midline shift mass effect. VL Extremity Venous Bilateral   Final Result      CTA HEAD NECK W CONTRAST   Final Result   Severely motion degraded exam without large vessel occlusion in the head or   neck. CT HEAD WO CONTRAST   Final Result   Addendum (preliminary) 1 of 1   ADDENDUM:   Results were called to Dr. Pedro Dobbins at 6:23 p.m. Brois Patella Final   No evidence of acute intracranial process. Remote bilateral cerebellar hemisphere infarcts noted along with a small   remote lacunar infarct of the left caudate head.             XR CHEST PORTABLE   Final Result   The endotracheal tube ends appropriately above the marita and below the   clavicular heads. The nasogastric tube ends within the stomach. XR CHEST PORTABLE   Final Result   Mild cardiomegaly and mild pulmonary edema. 1.5 cm slightly dense nodule right upper lobe which is unchanged         Vascular carotid duplex bilateral    (Results Pending)       CONSULTS:    [facility] NURSE/COORDINATOR  IP CONSULT TO DIETITIAN  IP CONSULT TO CARDIOLOGY  IP CONSULT TO CRITICAL CARE  IP CONSULT TO NEPHROLOGY  IP CONSULT TO NEUROLOGY  IP CONSULT TO PALLIATIVE CARE    ASSESSMENT AND PLAN:      Active Problems:    Congestive heart failure (HCC)    NSTEMI (non-ST elevated myocardial infarction) ([facility] [address]    PEYTON (acute kidney injury) ([facility] [address]    Elevated troponin    COPD exacerbation (HCC)    Acute hypoxemic respiratory failure (HCC)    Acute pulmonary edema (HCC)  Resolved Problems:    * No resolved hospital problems. *    Patient is a 67-year-old female with a past medical history of COPD, hypertension, hyper lipidemia who was admitted with CHF exacerbation with possible NSTEMI and COPD exacerbation. Hospital course was complicated by bradycardia followed by CODE BLUE and patient was transferred to the ICU. With elevated D-dimer demand ischemia shortness of breath there is concern for possible PE. Patient is started on heparin drip. Acute on chronic hypoxic respiratory failure-vent management per pulm, possible extubation  -extubated. Remains with tenuous pulm status  -keep in icu today  -continue diuresis  -Patient uses home oxygen. Unclear how much oxygen is used at home    Cardiac arrest-cause unclear  -Bradycardia followed by PEA cardiac arrest  -With elevated D-dimer unclear if patient had a PE(presentation unlike cardiac arrest for PE)  -Already on a heparin drip for NSTEMI prior to code  -Venous duplex ordered negative for any DVT  -Unable to get a CTA chest due to PEYTON.   -Echo shows some RV dilatation  -gentle diuresis    Possible diastolic CHF exacerbation-on admission. Hold diuretic today  -Echo shows a EF of 60%. RV dilated  -Chest x-ray on admission showed mild pulmonary edema  -restart lasix    Possible NSTEMI  -Cardiology consult appreciated  -Already on heparin  -Further work-up per cardiology  -Troponin stable    Acute kidney injury-urine output improved, creatinine still trending  -Monitor HOWARD  -Nephrology consulted    Possible COPD exacerbation  -Continue steroids with inhalers/nebulizers  -Continue doxycycline    Hypertension  -Hold blood pressure medications    Elevated LFTs-possibly due to cardiac arrest.  Repeat CMP tomorrow  -Stop statin        Some concern for possible code stroke but patient was intubated at that time. -CT head shows some remote lacunar and cerebellar infarct  -Already on aspirin and statin  -CTA head and neck shows no critical stenosis      DVT Prophylaxis: Heparin  Diet: Diet NPO  Code Status: Full Code    PT/OT Eval Status:  ordered  Speech therapy ordered.   Discharge plan -continue care      Ashlee Bedolla MD

## 2022-11-09 NOTE — PROGRESS NOTES
Patient extubated earlier today and is a high risk for reintubation. RN will defer to speech therapy for evaluation of swallowing. RN did not perform bedside swallow evaluation.

## 2022-11-09 NOTE — PROGRESS NOTES
4 Eyes Skin Assessment     NAME:  Lianna Champagne  YOB: 1950  MEDICAL RECORD NUMBER:  [id number]    The patient is being assess for  Shift Handoff    I agree that 2 RN's have performed a thorough Head to Toe Skin Assessment on the patient. ALL assessment sites listed below have been assessed. Areas assessed by both nurses:    Head, Face, Ears, Shoulders, Back, Chest, Arms, Elbows, Hands, Sacrum. Buttock, Coccyx, Ischium, and Legs. Feet and Heels        Does the Patient have a Wound?  No noted wound(s)       Ajit Prevention initiated:  Yes   Wound Care Orders initiated:  No    Pressure Injury (Stage 3,4, Unstageable, DTI, NWPT, and Complex wounds) if present place referral/consult order under [de-identified] No    New and Established Ostomies if present place consult order under : No      Nurse 1 eSignature: Electronically signed by Basil Fernandez RN on 11/9/22 at 9:12 AM EST    **SHARE this note so that the co-signing nurse is able to place an eSignature**    Nurse 2 eSignature: Electronically signed by Uriel Martinez RN on 11/9/22 at 7:31 PM EST

## 2022-11-09 NOTE — PROGRESS NOTES
Cardiology - PROGRESS NOTE    Admit Date: 11/6/2022     Reason for follow up: shortness of breath, elevated troponin     71 y.o. female who comes to the hospital with several days history of shortness of breath cough inability to lay flat. She has COPD and is on oxygen at home. Admission proBNP is 8318. EKG is normal creatinine is normal and hemoglobin is normal         Social History:   reports that she has been smoking cigarettes. She has a 12.50 pack-year smoking history. She has never used smokeless tobacco. She reports that she does not currently use alcohol. She reports that she does not use drugs. Family History: family history includes Cancer in her mother; Heart Attack in her father. Living Situation:      Interval History:   Patient seen and examined and notes reviewed   Sleeping in chair  Restless and anxious overnight   Off and on BiPap and episodes of desat   Code Stroke activated on 11/8/22 after extubation   R cortical infarcts   All other systems reviewed and negative except as above.         Diet: Diet NPO      In: 372.6 [I.V.:78.6]  Out: 1700    Patient Vitals for the past 96 hrs (Last 3 readings):   Weight   11/09/22 0600 172 lb 2.9 oz (78.1 kg)   11/08/22 0500 177 lb 14.6 oz (80.7 kg)   11/07/22 0600 179 lb 10.8 oz (81.5 kg)           Data:   Scheduled Meds:   Scheduled Meds:   furosemide  80 mg IntraVENous Once    nicotine  1 patch TransDERmal Daily    aspirin  81 mg Oral Daily    Or    aspirin  300 mg Rectal Daily    atorvastatin  40 mg Oral Nightly    lidocaine  1 patch TransDERmal Daily    doxycycline (VIBRAMYCIN) IV  100 mg IntraVENous Q12H    methylPREDNISolone  40 mg IntraVENous Daily    famotidine (PEPCID) injection  20 mg IntraVENous Daily    Roflumilast  500 mcg Oral Daily    mometasone-formoterol  2 puff Inhalation BID    tiotropium  2 puff Inhalation Daily    sodium chloride flush  5-40 mL IntraVENous 2 times per day [Held by provider] furosemide  40 mg IntraVENous BID    miconazole   Topical BID     Continuous Infusions:   sodium chloride       PRN Meds:.traMADol, ondansetron **OR** ondansetron, polyethylene glycol, perflutren lipid microspheres, sodium chloride flush, sodium chloride, acetaminophen **OR** acetaminophen, perflutren lipid microspheres, guaiFENesin-dextromethorphan, ipratropium-albuterol, clonazePAM  Continuous Infusions:   sodium chloride         Intake/Output Summary (Last 24 hours) at 11/9/2022 0923  Last data filed at 11/9/2022 0526  Gross per 24 hour   Intake 372.61 ml   Output 1700 ml   Net -1327.39 ml       Lab Results   Component Value Date     (H) 11/06/2022     (H) 11/06/2022    ALKPHOS 181 (H) 11/06/2022    BILITOT 0.5 11/06/2022     Lab Results   Component Value Date    CREATININE 2.1 (H) 11/09/2022    BUN 81 (HH) 11/09/2022     (H) 11/09/2022    K 4.2 11/09/2022     11/09/2022    CO2 27 11/09/2022     No results found for: TSH, H1TSYJT, Y6UGOHN, THYROIDAB  Lab Results   Component Value Date    WBC 9.7 11/09/2022    HGB 11.8 (L) 11/09/2022    HCT 35.3 (L) 11/09/2022    MCV 93.0 11/09/2022     11/09/2022     No components found for: CHLPL  Lab Results   Component Value Date    TRIG 433 (H) 11/09/2022    TRIG 230 (H) 10/14/2015    TRIG 240 (H) 10/12/2015     Lab Results   Component Value Date    HDL 35 (L) 11/09/2022     Lab Results   Component Value Date    LDLCALC see below 11/09/2022     Lab Results   Component Value Date    LABVLDL see below 11/09/2022         -----------------------------------------------------------------  Telemetry: personally reviewed     Radiology:     CXR:  11/9/22  FINDINGS:   The endotracheal tube and nasogastric tube have been removed. Right PICC   line is over the expected SVC with the patient is very rotated to the right. There are residual opacities in the mid to lower right lung which appear more   than the left side.   The could be some decreasing volume and increasing   atelectasis in the right lower chest.  Pulmonary vascular congestion remains. A calcified granuloma in the right upper lobe is still present. No   pneumothorax is identified. Impression   Endotracheal tube and nasogastric tube have been removed. Right PICC line is   still present. Patient is rotated to the right distorting the mediastinum and grecia. There   does appear to be increasing opacities in the right lower chest and possible   volume loss. May represent a combination of edema and atelectasis. EK22  SR with PVC    Echo:    Summary   Normal LV size and wall motion. EF is    60%. indeterminate diastolic   function. Trivial mitral& tricuspid regurgitation is present. Left atrium is of normal size. Very mild aortic stenosis. Mean gradient 15 mmHg. RV appears dilated . Objective:   Vitals: /67   Pulse 80   Temp 97.7 °F (36.5 °C) (Axillary)   Resp 21   Ht 5' 3\" (1.6 m)   Wt 172 lb 2.9 oz (78.1 kg)   SpO2 100%   BMI 30.50 kg/m²   General appearance: sleepy appears stated age and cooperative, No acute distress   Skin: Skin color, texture, turgor normal. No rashes or ecchymosis. HEENT: Head: Normal, normocephalic, atraumatic. Neck: no carotid bruit, no JVD, supple, symmetrical, trachea midline, and thyroid not enlarged, symmetric, no tenderness/mass/nodules  Lungs: clear to auscultation bilaterally, no accessory muscle use,   Heart: regular rate and rhythm, S1, S2 normal, no murmur, click, rub or gallop  Abdomen: soft, non-tender; bowel sounds normal; no masses,  no organomegaly  Extremities: extremities normal, atraumatic, no cyanosis or edema, pulses: DP +2/+2, PT +2/+2, femoral +2/+2  Neurologic: Mental status: Alert, oriented, thought content appropriate, no tremors, no gross sensory motor deficit,   Psychiatric: normal insight and affect      Assessment & Plan:    Patient Active Problem List:          Plan:  1. Respiratory failure   Acute on chronic hypoxic and hypercapnia    Extubated    Remains on O2    Per pulm/CC  2. Heart failure    Acute on chronic diastolic    Preserved EF    Appears near compensated on exam   No ACE/ARB/aldactone/SGLT2i secondary to PEYTON   Start low dose beta-blocker with hold parameters   Fluid and Na restrictions   3. Elevated troponin    Stable   No wall motion abnormalities   Continue risk factor modification   On  statin   ASA per neurology    Low dose beta-blocker   4. PEYTON    Worsening fx  Hold lasix   If improves consider oral diuretic in AM   5.  CVA   Per neurology             SAULO Blank-CNP   [facility]  Cardiology   11/9/2022  9:23 AM

## 2022-11-09 NOTE — PROGRESS NOTES
4 Eyes Skin Assessment     NAME:  Teri Eng  YOB: 1950  MEDICAL RECORD NUMBER:  [id number]    The patient is being assess for  Shift Handoff    I agree that 2 RN's have performed a thorough Head to Toe Skin Assessment on the patient. ALL assessment sites listed below have been assessed. Areas assessed by both nurses:    Head, Face, Ears, Shoulders, Back, Chest, Arms, Elbows, Hands, Sacrum. Buttock, Coccyx, Ischium, and Legs. Feet and Heels        Does the Patient have a Wound?  No noted wound(s)       Ajit Prevention initiated:  Yes   Wound Care Orders initiated:  No    Pressure Injury (Stage 3,4, Unstageable, DTI, NWPT, and Complex wounds) if present place referral/consult order under [de-identified] No    New and Established Ostomies if present place consult order under : No      Nurse 1 eSignature: Electronically signed by Avery Orr RN on 11/8/22 at 8:15 PM EST    **SHARE this note so that the co-signing nurse is able to place an eSignature**    Nurse 2 eSignature: Electronically signed by Dina Dobbs RN on 11/8/22 at 8:24 PM EST

## 2022-11-09 NOTE — PROGRESS NOTES
Hypercholesteremia, and Hypertension. Past Surgical History:  has a past surgical history that includes Hysterectomy; Cholecystectomy;  section; Appendectomy; and Kidney removal.           Assessment   Performance deficits / Impairments: Decreased functional mobility ; Decreased safe awareness;Decreased balance;Decreased ADL status; Decreased high-level IADLs;Decreased endurance;Decreased ROM; Decreased strength  Assessment: 70 y/o female admitted 2022 with NSTEMI, cardiac arrest, and CVA. PTA pt lived at home alone and was independent with ADLs and functional mobility. Today, pt perseverating on being hungry and wanting food and difficult for redirection. Pt able to stand to stedy with CGA to transfer to chair. Pt was also able to stand to RW 2x with CGA/min A and take a few small steps in place. Pt with decreased L UE ROM/strength but functional. Anticipate pt will require up to max A for ADLs. Pt is functioning below baseline and benefit from skilled therapy prior to returning home.   Prognosis: Good  Decision Making: Medium Complexity  REQUIRES OT FOLLOW-UP: Yes  Activity Tolerance  Activity Tolerance: Patient limited by fatigue;Patient Tolerated treatment well  Activity Tolerance Comments: perseverating on wanting water        Plan   Occupational Therapy Plan  Times Per Week: 3-5  Current Treatment Recommendations: Strengthening, Gait training, Balance training, Neuromuscular re-education, Functional mobility training, Endurance training, Safety education & training, Self-Care / ADL, Patient/Caregiver education & training     Restrictions  Restrictions/Precautions  Restrictions/Precautions: Fall Risk  Position Activity Restriction  Other position/activity restrictions: 5L O2 via NC; NPO    Subjective   General  Chart Reviewed: Yes  Patient assessed for rehabilitation services?: Yes  Additional Pertinent Hx: Per Dr. Matthew Bullion: \"71year-old female with a past medical history of COPD, hypertension, hyper lipidemia who was admitted with CHF exacerbation with possible NSTEMI and COPD exacerbation. Hospital course was complicated by bradycardia followed by CODE BLUE and patient was transferred to the ICU. With elevated D-dimer demand ischemia shortness of breath there is concern for possible PE. Patient is started on heparin drip\" 11/8 MRI showed Acute infarct within the right frontal lobe as well as acute microinfarcts  within the left occipital lobe. Pt intubated 11/7-11/8. Family / Caregiver Present: No  Referring Practitioner: Dm Thrasher MD  Diagnosis: CHF/COPD exacerbation, cardiac arrest, CVA  Subjective  Subjective: Pt seen bedside and agreeable to therapy. Pt perseverating on \"being so hungry and wanting food. \"  General Comment  Comments: Per RN ok for therapy     Social/Functional History  Social/Functional History  Lives With: Alone  Type of Home: Apartment  Home Layout: One level  Home Access: Elevator  Bathroom Shower/Tub: Tub/Shower unit  Bathroom Toilet: Standard  Home Equipment:  (CPAP; Home O2)  Has the patient had two or more falls in the past year or any fall with injury in the past year?: Yes  ADL Assistance: Independent  Ambulation Assistance: Independent  Transfer Assistance: Independent  Active : No  Patient's  Info: Transport service       Objective   Safety Devices  Type of Devices: All fall risk precautions in place;Call light within reach; Left in chair;Nurse notified  Restraints  Restraints Initially in Place: No        AROM: Generally decreased, functional  Strength: Generally decreased, functional  Coordination: Generally decreased, functional    ADL  Toileting: Dependent/Total ([place])  Additional Comments: Anticipate pt will require max A for LB bathing/dressing, min A for UB bathing/dressing and grooming when seated based on balance and endurance obseved     Activity Tolerance  Activity Tolerance: Patient tolerated evaluation without incident;Treatment limited secondary to decreased cognition  Bed mobility  Sit to Supine:  (pt in chair at end of session)    Transfers  Sit to stand: Contact guard assistance;Minimal assistance  Stand to sit: Contact guard assistance;Minimal assistance  Transfer Comments: Pt stood bed > STEDY with CGA. Use of stedy to transfer bed > chair. Pt then stood from chair twice to RW with CGA/min A. Pt took a few steps in place with RW. Vision  Vision: Impaired  Vision Exceptions: Wears glasses at all times  Hearing  Hearing: Within functional limits  Cognition  Cognition Comment: Able to follow commands with inc time/repetion; Pt perseverating on food/water. Orientation  Overall Orientation Status:  (difficult to assess; pt is perseverating on food/water)                  Education Given To: Patient  Education Provided: Role of Therapy;Transfer Training  Education Method: Demonstration;Verbal  Barriers to Learning: Cognition  Education Outcome: Continued education needed    LUE AROM (degrees)  LUE AROM : WFL  LUE General AROM: able to achieve full range with slight drift. Unable to fully assess 2/2 perseverting on water  Left Hand AROM (degrees)  Left Hand General AROM: weak grasp but functional  RUE AROM (degrees)  RUE AROM : WFL  Right Hand AROM (degrees)  Right Hand AROM: [facility] Score  [facility] Inpatient Daily Activity Raw Score: 15 (11/09/22 1026)  AM-[facility] Inpatient ADL T-Scale Score : 34.69 (11/09/22 1026)  ADL Inpatient CMS 0-100% Score: 56.46 (11/09/22 1026)  ADL Inpatient CMS G-Code Modifier : CK (11/09/22 1026)    Goals  Short Term Goals  Time Frame for Short Term Goals: Prior to DC:   Short Term Goal 1: Pt will complete ADL transfers/mobility with supervision  Short Term Goal 2: Pt will complete toileting with SBA  Short Term Goal 3: Pt will complete LB Dressing with SBA  Short Term Goal 4: Pt will tolerate standing > 3 min for functional task with supervision  Short Term Goal 5: Pt will complete UE exercises in all planes to inc strength/endurance for ADLs  Patient Goals   Patient goals : to get stronger       Therapy Time   Individual Concurrent Group Co-treatment   Time In 0940         Time Out 1020         Minutes 40         Timed Code Treatment Minutes: 25 Minutes     This note to serve as OT d/c summary if pt is d/c-ed prior to next therapy session.     Ezekiel Flores, OTR/L

## 2022-11-09 NOTE — CONSULTS
[facility]  Palliative Care   Consult Note    NAME:  [facility] RECORD NUMBER:  [id number]  AGE: 71 y.o. GENDER: female  : 1950  TODAY'S DATE:  2022    Subjective     Reason for Consult:  symptom management, goals of care, and code status   Visit Type: Initial Consult      Orion Hassan is a 71 y.o. female referred by:   [x] Physician    PAST MEDICAL HISTORY      Diagnosis Date    COPD, mild ([address]     Depression     Hypercholesteremia     Hypertension        PAST SURGICAL HISTORY  Past Surgical History:   Procedure Laterality Date    APPENDECTOMY       SECTION      CHOLECYSTECTOMY      HYSTERECTOMY (CERVIX STATUS UNKNOWN)      KIDNEY REMOVAL         FAMILY HISTORY  Family History   Problem Relation Age of Onset    Cancer Mother         brain and throat    Heart Attack Father        SOCIAL HISTORY  Social History     Tobacco Use    Smoking status: Every Day     Packs/day: 0.25     Years: 50.00     Pack years: 12.50     Types: Cigarettes    Smokeless tobacco: Never   Vaping Use    Vaping Use: Never used   Substance Use Topics    Alcohol use: Not Currently     Comment: weekly    Drug use: No       ALLERGIES  No Known Allergies    MEDICATIONS  No current facility-administered medications on file prior to encounter. Current Outpatient Medications on File Prior to Encounter   Medication Sig Dispense Refill    methylPREDNISolone (MEDROL, TREVOR,) 4 MG tablet Take by mouth. (Patient not taking: Reported on 2022) 1 kit 0    OXYGEN Inhale into the lungs Patient uses as needed. She doesn't know how much she uses.  (Patient not taking: Reported on 2022)      albuterol sulfate  (90 Base) MCG/ACT inhaler Inhale 2 puffs into the lungs 4 times daily as needed for Wheezing 1 Inhaler 0    albuterol (PROVENTIL) (5 MG/ML) 0.5% nebulizer solution Take 0.5 mLs by nebulization every 6 hours as needed for Wheezing (Patient not taking: Reported on 2022) 120 each 0    Cholecalciferol (VITAMIN D3) 69514 units CAPS TAKE ONE CAPSULE BY MOUTH ONCE A WEEK (Patient not taking: Reported on 11/6/2022) 4 capsule 3    Roflumilast (DALIRESP) 500 MCG tablet Take 500 mcg by mouth daily 30 tablet 2    Fluticasone Furoate-Vilanterol (BREO ELLIPTA IN) Inhale into the lungs      Umeclidinium Bromide (INCRUSE ELLIPTA IN) Inhale into the lungs (Patient not taking: Reported on 11/6/2022)      amLODIPine (NORVASC) 5 MG tablet Take 5 mg by mouth daily      Nicotine (NICODERM CQ TD) Place onto the skin (Patient not taking: Reported on 11/6/2022)      vitamin D (CHOLECALCIFEROL) 1000 UNIT TABS tablet Take 1 tablet by mouth daily (Patient not taking: Reported on 11/6/2022) 30 tablet 1    Multiple Vitamins-Minerals (CENTROVITE) TABS Take 1 tablet by mouth daily (Patient not taking: Reported on 11/6/2022)      Calcium Carbonate-Vitamin D 500-400 MG-UNIT TABS Take 1 tablet by mouth daily (Patient not taking: Reported on 11/6/2022)      acetaminophen (APAP EXTRA STRENGTH) 500 MG tablet Take 2 tablets by mouth every 6 hours as needed for Pain (Patient not taking: Reported on 11/6/2022) 120 tablet 2    atorvastatin (LIPITOR) 40 MG tablet Take 40 mg by mouth daily         Objective         /70   Pulse 98   Temp 97.3 °F (36.3 °C) (Axillary)   Resp (!) 35   Ht 5' 3\" (1.6 m)   Wt 172 lb 2.9 oz (78.1 kg)   SpO2 100%   BMI 30.50 kg/m²     Code Status: Full Code    Advanced Directives: not completed SABI would be her daughter Rosie Silva [phone]    Assessment        Management and Education    Persons available for education:       [x] Self     [] Caregiver       [] Spouse       [] Other Family Member   []  Other    Spiritual History:  notified: Yes,    Does the patient have a Primary Care Physician?   No    Palliative Performance Scale:  60% [] Ambulation reduced; Significant disease; Can't do hobbies/housework; intake normal or reduced; occasional assist; LOC full/confusion  50% [x] Mainly sit/lie; Extensive disease; Can't do any work; Considerable assist; intake normal or reduced; LOC ful  40% [] Mainly in bed; Extensive disease; Mainly assist; intake normal or reduced; occasional assist; LOC full/confusion  30% [] Bed Bound; Extensive disease; Total care; intake reduced; LOC full/confusion  20% [] Bed Bound; Extensive disease; Total care; intake minimal; Drowsy/coma  10% [] Bed Bound; Extensive disease; Total care; Mouth care only; Drowsy/coma  0 [] Death    Level of patient/caregiver understanding able to:        [] Verbalize Understanding   [] Demonstrate Understanding       [] Teach Back       [x] Needs Reinforcement     []  Other:      Teaching Time:  0hours  30 minutes     Plan        Social Service Consult Made:  Yes  Assistance filling out Living Will/HPOA:  Yes      Discharge Plan:  Education/support to patient  Providing support for coping/adaptation/distress of patient  Clarification of medical condition to patient and family  Code status clarified: Full Code  Palliative care orders introduced    Discharge Environment:  [] Hospice Consult Agency:  [] Inpatient Hospice    [] Home with Hospice Care   [] ECF with Hospice  [] ECF skilled care with Hospice to follow   [] Other:    Discussion: Patient admitted from home for increased shortness of breath, she has since had NSTEMI, and CVA. Today she is off vent, left sided facial drop noted and left sided weakness. She is alert and oriented to person, place, time little confused on situation. Her nurse Alice and I discussed her current medical conditions since admission, she seem surprised. She has kept telling staff she does not want to go back on ventilator. We discussed difficulty breathing and possibility of needing to be reintubated while she does not want that she does say she wants to get help to keep living. She did agree she does not want to live on machines.    She states she was living on her own in her own apt. Her daughter was present but slept through meeting. I will continue to follow Ms. Almanzar's care as needed. Thank you for allowing me to participate in the care of Ms. Almanzar .      Electronically signed by Trevon Mcclure RN, BSN, [facility] on 11/9/2022 at 1:54 PM  [address] [place] [address]  Office: [phone]

## 2022-11-09 NOTE — CONSULTS
Neurology Consult Note  Reason for Consult: acute left facial weakness post extubation     Chief complaint: SOB     Dr Marie Chakraborty MD asked me to see Paradise Stewart in consultation for evaluation of     History of Present Illness:  Paradise Stewart is a 71 y.o. female who presents with sob h/o COPD, smoker, HTN,HLD. Admit for COPD exacerbation, new onset CHF, NSTEMI. Pt suffered PEA cardiac arrest and was intubated on . Post arrest and extubation on  pt was noticed to have left sided weakness and facial drooping. Was on heparin gtt, asa and statin at the time. Upon extubation  MRI demonstrated right frontal lobe and left occipital lobe infarct. [facility] Stroke team consulted, outside window for reperfusion therapy d/t symptom onset 2 days prior.        Medical History:  Past Medical History:   Diagnosis Date    COPD, mild ([address]     Depression     Hypercholesteremia     Hypertension      Past Surgical History:   Procedure Laterality Date    APPENDECTOMY       SECTION      CHOLECYSTECTOMY      HYSTERECTOMY (CERVIX STATUS UNKNOWN)      KIDNEY REMOVAL       Scheduled Meds:   ipratropium-albuterol  1 ampule Inhalation Q4H    budesonide  0.5 mg Nebulization BID    sodium chloride (Inhalant)  15 mL Nebulization TID    nicotine  1 patch TransDERmal Daily    aspirin  81 mg Oral Daily    Or    aspirin  300 mg Rectal Daily    atorvastatin  40 mg Oral Nightly    lidocaine  1 patch TransDERmal Daily    doxycycline (VIBRAMYCIN) IV  100 mg IntraVENous Q12H    methylPREDNISolone  40 mg IntraVENous Daily    famotidine (PEPCID) injection  20 mg IntraVENous Daily    Roflumilast  500 mcg Oral Daily    sodium chloride flush  5-40 mL IntraVENous 2 times per day    [Held by provider] furosemide  40 mg IntraVENous BID    miconazole   Topical BID     Continuous Infusions:   sodium chloride       PRN Meds:.traMADol, ondansetron **OR** ondansetron, polyethylene glycol, perflutren lipid microspheres, sodium chloride flush, sodium chloride, acetaminophen **OR** acetaminophen, perflutren lipid microspheres, guaiFENesin-dextromethorphan, ipratropium-albuterol, clonazePAM    Medications Prior to Admission: methylPREDNISolone (MEDROL, TREVOR,) 4 MG tablet, Take by mouth. (Patient not taking: Reported on 11/6/2022)  OXYGEN, Inhale into the lungs Patient uses as needed. She doesn't know how much she uses.  (Patient not taking: Reported on 11/6/2022)  albuterol sulfate  (90 Base) MCG/ACT inhaler, Inhale 2 puffs into the lungs 4 times daily as needed for Wheezing  albuterol (PROVENTIL) (5 MG/ML) 0.5% nebulizer solution, Take 0.5 mLs by nebulization every 6 hours as needed for Wheezing (Patient not taking: Reported on 11/6/2022)  Cholecalciferol (VITAMIN D3) 53365 units CAPS, TAKE ONE CAPSULE BY MOUTH ONCE A WEEK (Patient not taking: Reported on 11/6/2022)  Roflumilast (DALIRESP) 500 MCG tablet, Take 500 mcg by mouth daily  Fluticasone Furoate-Vilanterol (BREO ELLIPTA IN), Inhale into the lungs  Umeclidinium Bromide (INCRUSE ELLIPTA IN), Inhale into the lungs (Patient not taking: Reported on 11/6/2022)  amLODIPine (NORVASC) 5 MG tablet, Take 5 mg by mouth daily  Nicotine (NICODERM CQ TD), Place onto the skin (Patient not taking: Reported on 11/6/2022)  [DISCONTINUED] DULoxetine (CYMBALTA) 60 MG extended release capsule, Take 60 mg by mouth daily  vitamin D (CHOLECALCIFEROL) 1000 UNIT TABS tablet, Take 1 tablet by mouth daily (Patient not taking: Reported on 11/6/2022)  Multiple Vitamins-Minerals (CENTROVITE) TABS, Take 1 tablet by mouth daily (Patient not taking: Reported on 11/6/2022)  Calcium Carbonate-Vitamin D 500-400 MG-UNIT TABS, Take 1 tablet by mouth daily (Patient not taking: Reported on 11/6/2022)  [DISCONTINUED] clonazePAM (KLONOPIN) 1 MG tablet, Take 1 tablet by mouth 2 times daily  [DISCONTINUED] SYMBICORT 160-4.5 MCG/ACT AERO, Take 1 puff by mouth every 4 hours as needed  [DISCONTINUED] TUDORZA PRESSAIR 400 MCG/ACT AEPB inhaler, Take 1 puff by mouth daily as needed  acetaminophen (APAP EXTRA STRENGTH) 500 MG tablet, Take 2 tablets by mouth every 6 hours as needed for Pain (Patient not taking: Reported on 11/6/2022)  atorvastatin (LIPITOR) 40 MG tablet, Take 40 mg by mouth daily    No Known Allergies    Family History   Problem Relation Age of Onset    Cancer Mother         brain and throat    Heart Attack Father        Social History     Tobacco Use   Smoking Status Every Day    Packs/day: 0.25    Years: 50.00    Pack years: 12.50    Types: Cigarettes   Smokeless Tobacco Never     Social History     Substance and Sexual Activity   Drug Use No     Social History     Substance and Sexual Activity   Alcohol Use Not Currently    Comment: weekly       ROS:  Constitutional- No weight loss or fevers  Eyes- No diplopia. No photophobia. Ears/nose/throat- No dysphagia. No Dysarthria  Cardiovascular- No palpitations. No chest pain  Respiratory- +audible crackles. No dyspnea. No Cough  Gastrointestinal- No Abdominal pain. No Vomiting. Genitourinary- No incontinence. No urinary retention  Musculoskeletal- No myalgia. No arthralgia  Skin- No rash. No easy bruising. Psychiatric- No depression. No anxiety  Endocrine- No diabetes. No thyroid issues. Hematologic- No bleeding difficulty. No fatigue  Neurologic- left side facial droop, LUE/LLE weakness. No Headache. Exam:  Blood pressure 137/67, pulse 80, temperature 97.7 °F (36.5 °C), temperature source Axillary, resp. rate 21, height 5' 3\" (1.6 m), weight 172 lb 2.9 oz (78.1 kg), SpO2 100 %, not currently breastfeeding. Constitutional   Vital signs: BP, HR, and RR reviewed   General Alert, no distress, well-nourished  Eyes: unable to visualize the fundi  Cardiovascular: pulses symmetric in all 4 extremities. No peripheral edema. Psychiatric: cooperative with examination, no  psychotic behavior noted. Neurologic  Mental status:   orientation to person, place, time.     General fund of knowledge  intact   Memory  intact   Attention i able to attend well to the exam     Language dysarthria    Comprehension intact; follows simple commands  Cranial nerves:   CN2: Visual Fields full w/o extinction on confrontational testing,   CN 3,4,6: +difficulty looking to the left, crossing midline. CN5: Left side diminished. CN7:+Left sided facial droop with dysarthria. CN8: hearing grossly intact  CN9: palate elevated symmetrically  CN11: trap full strength on shoulder shrug  CN12: tongue deviates left with protrusion  Strength: Left arm drift No pronator drift. Left sided upper and lower extremity weakness. Deep tendon reflexes: normal in all 4 extremities  Sensory: bilat upper extremity tremor. light touch intact in all 4 extremities. No sensory extinction on double simultaneous stimulation  Cerebellar/coordination: YANET left. Right finger nose finger normal without ataxia  Tone: normal in all 4 extremities  Gait: deferred for safety     Labs   Latest Reference Range & Units 11/9/22 04:50 11/9/22 08:10   Sodium 136 - 145 mmol/L 146 (H)    Potassium 3.5 - 5.1 mmol/L 4.2    Chloride 99 - 110 mmol/L 105    CO2 21 - 32 mmol/L 27    BUN,BUNPL 7 - 20 mg/dL 81 (HH)    Creatinine 0.6 - 1.2 mg/dL 2.1 (H)    Anion Gap 3 - 16  14    Est, Glom Filt Rate >60  25 !     Magnesium 1.80 - 2.40 mg/dL 2.40    Glucose, Random 70 - 99 mg/dL 112 (H)    POC Glucose 70 - 99 mg/dl  125 (H)   CALCIUM, SERUM, 593393 8.3 - 10.6 mg/dL 9.5    Phosphorus 2.5 - 4.9 mg/dL 3.7    CHOLESTEROL, TOTAL, 417896 0 - 199 mg/dL 205 (H)    HDL Cholesterol 40 - 60 mg/dL 35 (L)    LDL Calculated <100 mg/dL see below    LDL DIRECT,LDL <100 mg/dL 103 (H)    Triglycerides 0 - 150 mg/dL 433 (H)    VLDL Cholesterol Calculated Not Established mg/dL see below    Albumin 3.4 - 5.0 g/dL 4.3    WBC 4.0 - 11.0 K/uL 9.7    RBC 4.00 - 5.20 M/uL 3.80 (L)    Hemoglobin Quant 12.0 - 16.0 g/dL 11.8 (L)    Hematocrit 36.0 - 48.0 % 35.3 (L)    MCV 80.0 - 100.0 fL 93.0    MCH 26.0 - 34.0 pg 31.1    MCHC 31.0 - 36.0 g/dL 33.4    MPV 5.0 - 10.5 fL 8.0    RDW 12.4 - 15.4 % 13.6    Platelet Count 950 - 450 K/uL 227    Neutrophils % % 85.2    Lymphocyte % % 4.3    Monocytes % % 10.4    Eosinophils % % 0.0    Basophils % % 0.1    Neutrophils Absolute 1.7 - 7.7 K/uL 8.3 (H)    Lymphocytes Absolute 1.0 - 5.1 K/uL 0.4 (L)    Monocytes Absolute 0.0 - 1.3 K/uL 1.0    Eosinophils Absolute 0.0 - 0.6 K/uL 0.0    Basophils Absolute 0.0 - 0.2 K/uL 0.0        Studies  CT head wo contrast 11/6/22  Impression   No evidence of acute intracranial process. Remote bilateral cerebellar hemisphere infarcts noted along with a small   remote lacunar infarct of the left caudate head. CTA 11/6/22  Impression   Severely motion degraded exam without large vessel occlusion in the head or   neck. MRI wo contrast 11/8/22    Impression   Severely motion degraded exam.       Acute infarct within the right frontal lobe as well as acute microinfarcts   within the left occipital lobe. Echo 11/7/22    Summary   Normal LV size and wall motion. EF is    60%. indeterminate diastolic   function. Trivial mitral& tricuspid regurgitation is present. Left atrium is of normal size. Very mild aortic stenosis. Mean gradient 15 mmHg. RV appears dilated . EKG 11/7/22  Sinus rhythm with Premature supraventricular complexes. Right bundle branch block    Impression  Post cardiac arrest, extubated with left sides facial weakness. Current treatment PEYTON, CHF, COPD exacerbation, possible NSTEMI, elevated LFT, HTN. MRI findings: right frontal lobe, left occipital lobe infarct. On heparin gtt at the time of infarct, which was stopped 11/8. 1.Stroke  2. HTN  3. Recommendations  Hold anticoags for 6 days from symptom onset. If unable to get contrast CTA in the next day or two, consider MRA w/wo.   Continue ASA statin outpatient, PT/OT    [facility]    A copy of this note was provided for Dr Michael Garcia MD

## 2022-11-10 ENCOUNTER — APPOINTMENT (OUTPATIENT)
Dept: GENERAL RADIOLOGY | Age: 72
DRG: 133 | End: 2022-11-10
Payer: MEDICAID

## 2022-11-10 LAB
ALBUMIN SERPL-MCNC: 4.3 G/DL (ref 3.4–5)
ANION GAP SERPL CALCULATED.3IONS-SCNC: 17 MMOL/L (ref 3–16)
BASOPHILS ABSOLUTE: 0 K/UL (ref 0–0.2)
BASOPHILS RELATIVE PERCENT: 0.1 %
BUN BLDV-MCNC: 73 MG/DL (ref 7–20)
CALCIUM SERPL-MCNC: 10 MG/DL (ref 8.3–10.6)
CHLORIDE BLD-SCNC: 107 MMOL/L (ref 99–110)
CO2: 28 MMOL/L (ref 21–32)
CREAT SERPL-MCNC: 1.8 MG/DL (ref 0.6–1.2)
EOSINOPHILS ABSOLUTE: 0 K/UL (ref 0–0.6)
EOSINOPHILS RELATIVE PERCENT: 0 %
GFR SERPL CREATININE-BSD FRML MDRD: 30 ML/MIN/{1.73_M2}
GLUCOSE BLD-MCNC: 123 MG/DL (ref 70–99)
HCT VFR BLD CALC: 35.6 % (ref 36–48)
HEMOGLOBIN: 11.9 G/DL (ref 12–16)
LYMPHOCYTES ABSOLUTE: 0.2 K/UL (ref 1–5.1)
LYMPHOCYTES RELATIVE PERCENT: 3.3 %
MAGNESIUM: 2.4 MG/DL (ref 1.8–2.4)
MCH RBC QN AUTO: 31.2 PG (ref 26–34)
MCHC RBC AUTO-ENTMCNC: 33.4 G/DL (ref 31–36)
MCV RBC AUTO: 93.5 FL (ref 80–100)
MONOCYTES ABSOLUTE: 0.7 K/UL (ref 0–1.3)
MONOCYTES RELATIVE PERCENT: 9.2 %
NEUTROPHILS ABSOLUTE: 6.5 K/UL (ref 1.7–7.7)
NEUTROPHILS RELATIVE PERCENT: 87.4 %
PDW BLD-RTO: 13.5 % (ref 12.4–15.4)
PHOSPHORUS: 3.7 MG/DL (ref 2.5–4.9)
PLATELET # BLD: 203 K/UL (ref 135–450)
PMV BLD AUTO: 7.8 FL (ref 5–10.5)
POTASSIUM SERPL-SCNC: 3.9 MMOL/L (ref 3.5–5.1)
RBC # BLD: 3.81 M/UL (ref 4–5.2)
SODIUM BLD-SCNC: 152 MMOL/L (ref 136–145)
WBC # BLD: 7.4 K/UL (ref 4–11)

## 2022-11-10 PROCEDURE — 99233 SBSQ HOSP IP/OBS HIGH 50: CPT | Performed by: NURSE PRACTITIONER

## 2022-11-10 PROCEDURE — 6370000000 HC RX 637 (ALT 250 FOR IP): Performed by: INTERNAL MEDICINE

## 2022-11-10 PROCEDURE — 6360000002 HC RX W HCPCS: Performed by: INTERNAL MEDICINE

## 2022-11-10 PROCEDURE — 2000000000 HC ICU R&B

## 2022-11-10 PROCEDURE — 2580000003 HC RX 258: Performed by: STUDENT IN AN ORGANIZED HEALTH CARE EDUCATION/TRAINING PROGRAM

## 2022-11-10 PROCEDURE — 94761 N-INVAS EAR/PLS OXIMETRY MLT: CPT

## 2022-11-10 PROCEDURE — 2500000003 HC RX 250 WO HCPCS: Performed by: NURSE PRACTITIONER

## 2022-11-10 PROCEDURE — 2580000003 HC RX 258: Performed by: NURSE PRACTITIONER

## 2022-11-10 PROCEDURE — 2700000000 HC OXYGEN THERAPY PER DAY

## 2022-11-10 PROCEDURE — 85025 COMPLETE CBC W/AUTO DIFF WBC: CPT

## 2022-11-10 PROCEDURE — 99233 SBSQ HOSP IP/OBS HIGH 50: CPT | Performed by: INTERNAL MEDICINE

## 2022-11-10 PROCEDURE — 6360000002 HC RX W HCPCS: Performed by: NURSE PRACTITIONER

## 2022-11-10 PROCEDURE — 94668 MNPJ CHEST WALL SBSQ: CPT

## 2022-11-10 PROCEDURE — 74230 X-RAY XM SWLNG FUNCJ C+: CPT

## 2022-11-10 PROCEDURE — 80069 RENAL FUNCTION PANEL: CPT

## 2022-11-10 PROCEDURE — 94640 AIRWAY INHALATION TREATMENT: CPT

## 2022-11-10 PROCEDURE — 83735 ASSAY OF MAGNESIUM: CPT

## 2022-11-10 PROCEDURE — 92611 MOTION FLUOROSCOPY/SWALLOW: CPT

## 2022-11-10 PROCEDURE — 92526 ORAL FUNCTION THERAPY: CPT

## 2022-11-10 RX ORDER — SODIUM CHLORIDE 9 MG/ML
25 INJECTION, SOLUTION INTRAVENOUS PRN
Status: DISCONTINUED | OUTPATIENT
Start: 2022-11-10 | End: 2022-11-11 | Stop reason: HOSPADM

## 2022-11-10 RX ORDER — LIDOCAINE HYDROCHLORIDE 10 MG/ML
5 INJECTION, SOLUTION EPIDURAL; INFILTRATION; INTRACAUDAL; PERINEURAL ONCE
Status: DISCONTINUED | OUTPATIENT
Start: 2022-11-10 | End: 2022-11-11 | Stop reason: HOSPADM

## 2022-11-10 RX ORDER — SODIUM CHLORIDE 0.9 % (FLUSH) 0.9 %
5-40 SYRINGE (ML) INJECTION PRN
Status: DISCONTINUED | OUTPATIENT
Start: 2022-11-10 | End: 2022-11-11 | Stop reason: HOSPADM

## 2022-11-10 RX ORDER — SODIUM CHLORIDE 0.9 % (FLUSH) 0.9 %
5-40 SYRINGE (ML) INJECTION EVERY 12 HOURS SCHEDULED
Status: DISCONTINUED | OUTPATIENT
Start: 2022-11-10 | End: 2022-11-11 | Stop reason: HOSPADM

## 2022-11-10 RX ORDER — FUROSEMIDE 10 MG/ML
80 INJECTION INTRAMUSCULAR; INTRAVENOUS ONCE
Status: COMPLETED | OUTPATIENT
Start: 2022-11-10 | End: 2022-11-10

## 2022-11-10 RX ORDER — AMLODIPINE BESYLATE 5 MG/1
5 TABLET ORAL DAILY
Status: DISCONTINUED | OUTPATIENT
Start: 2022-11-10 | End: 2022-11-11 | Stop reason: HOSPADM

## 2022-11-10 RX ORDER — LORAZEPAM 2 MG/ML
0.5 INJECTION INTRAMUSCULAR
Status: DISCONTINUED | OUTPATIENT
Start: 2022-11-10 | End: 2022-11-11 | Stop reason: HOSPADM

## 2022-11-10 RX ADMIN — IPRATROPIUM BROMIDE AND ALBUTEROL SULFATE 1 AMPULE: .5; 2.5 SOLUTION RESPIRATORY (INHALATION) at 08:00

## 2022-11-10 RX ADMIN — LORAZEPAM 0.25 MG: 2 INJECTION INTRAMUSCULAR; INTRAVENOUS at 01:58

## 2022-11-10 RX ADMIN — IPRATROPIUM BROMIDE AND ALBUTEROL SULFATE 1 AMPULE: .5; 2.5 SOLUTION RESPIRATORY (INHALATION) at 16:13

## 2022-11-10 RX ADMIN — SODIUM CHLORIDE SOLN NEBU 3% 15 ML: 3 NEBU SOLN at 19:45

## 2022-11-10 RX ADMIN — FUROSEMIDE 80 MG: 10 INJECTION, SOLUTION INTRAMUSCULAR; INTRAVENOUS at 10:12

## 2022-11-10 RX ADMIN — BUDESONIDE 500 MCG: 0.5 SUSPENSION RESPIRATORY (INHALATION) at 08:00

## 2022-11-10 RX ADMIN — Medication 20 MG: at 07:55

## 2022-11-10 RX ADMIN — MICONAZOLE NITRATE: 2 POWDER TOPICAL at 21:07

## 2022-11-10 RX ADMIN — FENTANYL CITRATE 25 MCG: 0.05 INJECTION, SOLUTION INTRAMUSCULAR; INTRAVENOUS at 03:31

## 2022-11-10 RX ADMIN — LORAZEPAM 0.25 MG: 2 INJECTION INTRAMUSCULAR; INTRAVENOUS at 13:03

## 2022-11-10 RX ADMIN — MORPHINE SULFATE 1 MG: 2 INJECTION, SOLUTION INTRAMUSCULAR; INTRAVENOUS at 04:48

## 2022-11-10 RX ADMIN — SODIUM CHLORIDE, PRESERVATIVE FREE 10 ML: 5 INJECTION INTRAVENOUS at 07:56

## 2022-11-10 RX ADMIN — LORAZEPAM 0.5 MG: 2 INJECTION INTRAMUSCULAR; INTRAVENOUS at 23:52

## 2022-11-10 RX ADMIN — SODIUM CHLORIDE SOLN NEBU 3% 15 ML: 3 NEBU SOLN at 12:03

## 2022-11-10 RX ADMIN — SODIUM CHLORIDE, PRESERVATIVE FREE 10 ML: 5 INJECTION INTRAVENOUS at 21:07

## 2022-11-10 RX ADMIN — IPRATROPIUM BROMIDE AND ALBUTEROL SULFATE 1 AMPULE: .5; 2.5 SOLUTION RESPIRATORY (INHALATION) at 11:41

## 2022-11-10 RX ADMIN — FENTANYL CITRATE 25 MCG: 0.05 INJECTION, SOLUTION INTRAMUSCULAR; INTRAVENOUS at 09:02

## 2022-11-10 RX ADMIN — DOXYCYCLINE 100 MG: 100 INJECTION, POWDER, LYOPHILIZED, FOR SOLUTION INTRAVENOUS at 08:08

## 2022-11-10 RX ADMIN — LORAZEPAM 0.25 MG: 2 INJECTION INTRAMUSCULAR; INTRAVENOUS at 17:51

## 2022-11-10 RX ADMIN — METHYLPREDNISOLONE SODIUM SUCCINATE 40 MG: 40 INJECTION, POWDER, FOR SOLUTION INTRAMUSCULAR; INTRAVENOUS at 07:56

## 2022-11-10 RX ADMIN — SODIUM CHLORIDE SOLN NEBU 3% 15 ML: 3 NEBU SOLN at 07:59

## 2022-11-10 RX ADMIN — LORAZEPAM 0.25 MG: 2 INJECTION INTRAMUSCULAR; INTRAVENOUS at 21:24

## 2022-11-10 RX ADMIN — DOXYCYCLINE 100 MG: 100 INJECTION, POWDER, LYOPHILIZED, FOR SOLUTION INTRAVENOUS at 21:07

## 2022-11-10 RX ADMIN — MICONAZOLE NITRATE: 2 POWDER TOPICAL at 08:08

## 2022-11-10 RX ADMIN — IPRATROPIUM BROMIDE AND ALBUTEROL SULFATE 1 AMPULE: .5; 2.5 SOLUTION RESPIRATORY (INHALATION) at 19:44

## 2022-11-10 RX ADMIN — BUDESONIDE 500 MCG: 0.5 SUSPENSION RESPIRATORY (INHALATION) at 19:44

## 2022-11-10 RX ADMIN — ASPIRIN 300 MG: 300 SUPPOSITORY RECTAL at 07:54

## 2022-11-10 ASSESSMENT — PAIN DESCRIPTION - FREQUENCY
FREQUENCY: INTERMITTENT
FREQUENCY: CONTINUOUS
FREQUENCY: INTERMITTENT
FREQUENCY: CONTINUOUS
FREQUENCY: INTERMITTENT
FREQUENCY: INTERMITTENT

## 2022-11-10 ASSESSMENT — PAIN DESCRIPTION - DIRECTION
RADIATING_TOWARDS: CENTER

## 2022-11-10 ASSESSMENT — PAIN DESCRIPTION - ONSET
ONSET: ON-GOING

## 2022-11-10 ASSESSMENT — PAIN SCALES - GENERAL
PAINLEVEL_OUTOF10: 5
PAINLEVEL_OUTOF10: 5
PAINLEVEL_OUTOF10: 7
PAINLEVEL_OUTOF10: 0
PAINLEVEL_OUTOF10: 9
PAINLEVEL_OUTOF10: 3
PAINLEVEL_OUTOF10: 8
PAINLEVEL_OUTOF10: 4
PAINLEVEL_OUTOF10: 0
PAINLEVEL_OUTOF10: 5
PAINLEVEL_OUTOF10: 5

## 2022-11-10 ASSESSMENT — PAIN DESCRIPTION - DESCRIPTORS
DESCRIPTORS: DISCOMFORT
DESCRIPTORS: ACHING
DESCRIPTORS: DISCOMFORT;DULL
DESCRIPTORS: ACHING;THROBBING
DESCRIPTORS: DISCOMFORT

## 2022-11-10 ASSESSMENT — PAIN DESCRIPTION - ORIENTATION
ORIENTATION: MID
ORIENTATION: RIGHT;LEFT;MID
ORIENTATION: MID
ORIENTATION: MID
ORIENTATION: RIGHT;LEFT;MID
ORIENTATION: MID

## 2022-11-10 ASSESSMENT — PAIN - FUNCTIONAL ASSESSMENT
PAIN_FUNCTIONAL_ASSESSMENT: ACTIVITIES ARE NOT PREVENTED

## 2022-11-10 ASSESSMENT — PAIN DESCRIPTION - PAIN TYPE
TYPE: CHRONIC PAIN

## 2022-11-10 ASSESSMENT — PAIN DESCRIPTION - LOCATION
LOCATION: BACK

## 2022-11-10 NOTE — PROGRESS NOTES
Cardiology - PROGRESS NOTE    Admit Date: 11/6/2022     Reason for follow up: shortness of breath, elevated troponin     71 y.o. female who comes to the hospital with several days history of shortness of breath cough inability to lay flat. She has COPD and is on oxygen at home. Admission proBNP is 8318. EKG is normal creatinine is normal and hemoglobin is normal         Social History:   reports that she has been smoking cigarettes. She has a 12.50 pack-year smoking history. She has never used smokeless tobacco. She reports that she does not currently use alcohol. She reports that she does not use drugs. Family History: family history includes Cancer in her mother; Heart Attack in her father. Living Situation:      Interval History:   Patient seen and examined and notes reviewed   Pt sitting in chair  Attempting to drink from I/S  Unable to be redirected and does not understand I/S does not contain fluid  ST on tele  Family present   Code Stroke activated on 11/8/22 after extubation   R cortical infarcts   Pass swallow eval however concern for aspiration per nursing   All other systems reviewed and negative except as above. Diet: ADULT DIET; Dysphagia - Pureed; Low Sodium (2 gm);  Moderately Thick (Honey); 2000 ml      In: 282   Out: 2434    Patient Vitals for the past 96 hrs (Last 3 readings):   Weight   11/10/22 0539 171 lb 1.2 oz (77.6 kg)   11/09/22 0600 172 lb 2.9 oz (78.1 kg)   11/08/22 0500 177 lb 14.6 oz (80.7 kg)           Data:   Scheduled Meds:   Scheduled Meds:   amLODIPine  5 mg Oral Daily    budesonide  0.5 mg Nebulization BID    sodium chloride (Inhalant)  15 mL Nebulization TID    [Held by provider] carvedilol  3.125 mg Oral BID     ipratropium-albuterol  1 ampule Inhalation 6 times per day    nicotine  1 patch TransDERmal Daily    aspirin  81 mg Oral Daily    Or    aspirin  300 mg Rectal Daily    atorvastatin  40 mg Oral Nightly lidocaine  1 patch TransDERmal Daily    doxycycline (VIBRAMYCIN) IV  100 mg IntraVENous Q12H    methylPREDNISolone  40 mg IntraVENous Daily    famotidine (PEPCID) injection  20 mg IntraVENous Daily    Roflumilast  500 mcg Oral Daily    sodium chloride flush  5-40 mL IntraVENous 2 times per day    miconazole   Topical BID     Continuous Infusions:   sodium chloride       PRN Meds:.fentanNYL, LORazepam, traMADol, ondansetron **OR** ondansetron, polyethylene glycol, perflutren lipid microspheres, sodium chloride flush, sodium chloride, acetaminophen **OR** acetaminophen, perflutren lipid microspheres, guaiFENesin-dextromethorphan, ipratropium-albuterol, clonazePAM  Continuous Infusions:   sodium chloride         Intake/Output Summary (Last 24 hours) at 11/10/2022 1658  Last data filed at 11/10/2022 1600  Gross per 24 hour   Intake 281.97 ml   Output 1315 ml   Net -1033.03 ml       Lab Results   Component Value Date     (H) 11/06/2022     (H) 11/06/2022    ALKPHOS 181 (H) 11/06/2022    BILITOT 0.5 11/06/2022     Lab Results   Component Value Date    CREATININE 1.8 (H) 11/10/2022    BUN 73 (H) 11/10/2022     (H) 11/10/2022    K 3.9 11/10/2022     11/10/2022    CO2 28 11/10/2022     No results found for: TSH, U9YPLDM, Y5AFPJI, THYROIDAB  Lab Results   Component Value Date    WBC 7.4 11/10/2022    HGB 11.9 (L) 11/10/2022    HCT 35.6 (L) 11/10/2022    MCV 93.5 11/10/2022     11/10/2022     No components found for: CHLPL  Lab Results   Component Value Date    TRIG 433 (H) 11/09/2022    TRIG 230 (H) 10/14/2015    TRIG 240 (H) 10/12/2015     Lab Results   Component Value Date    HDL 35 (L) 11/09/2022     Lab Results   Component Value Date    LDLCALC see below 11/09/2022     Lab Results   Component Value Date    LABVLDL see below 11/09/2022         -----------------------------------------------------------------  Telemetry: personally reviewed     Radiology:     CXR:  11/9/22  FINDINGS: The endotracheal tube and nasogastric tube have been removed. Right PICC   line is over the expected SVC with the patient is very rotated to the right. There are residual opacities in the mid to lower right lung which appear more   than the left side. The could be some decreasing volume and increasing   atelectasis in the right lower chest.  Pulmonary vascular congestion remains. A calcified granuloma in the right upper lobe is still present. No   pneumothorax is identified. Impression   Endotracheal tube and nasogastric tube have been removed. Right PICC line is   still present. Patient is rotated to the right distorting the mediastinum and grecia. There   does appear to be increasing opacities in the right lower chest and possible   volume loss. May represent a combination of edema and atelectasis. EK22  SR with PVC    Echo:    Summary   Normal LV size and wall motion. EF is    60%. indeterminate diastolic   function. Trivial mitral& tricuspid regurgitation is present. Left atrium is of normal size. Very mild aortic stenosis. Mean gradient 15 mmHg. RV appears dilated . Objective:   Vitals: BP (!) 167/86   Pulse (!) 113   Temp 98.4 °F (36.9 °C) (Oral)   Resp (!) 31   Ht 5' 3\" (1.6 m)   Wt 171 lb 1.2 oz (77.6 kg)   SpO2 93%   BMI 30.30 kg/m²   General appearance: awake, difficult to reorient, restless   Skin: Skin color, texture, turgor normal. No rashes or ecchymosis. HEENT: Head: Normal, normocephalic, atraumatic.   Neck: no carotid bruit, no JVD, supple, symmetrical, trachea midline, and thyroid not enlarged, symmetric, no tenderness/mass/nodules  Lungs: clear to auscultation bilaterally, no accessory muscle use,   Heart: regular rate and rhythm, S1, S2 normal, no murmur, click, rub or gallop  Abdomen: soft, non-tender; bowel sounds normal; no masses,  no organomegaly  Extremities: extremities normal, atraumatic, no cyanosis or edema, pulses: DP +2/+2, PT

## 2022-11-10 NOTE — PLAN OF CARE
Problem: Discharge Planning  Goal: Discharge to home or other facility with appropriate resources  Outcome: Progressing  Flowsheets (Taken 11/9/2022 2000)  Discharge to home or other facility with appropriate resources: Identify barriers to discharge with patient and caregiver     Problem: Skin/Tissue Integrity  Goal: Absence of new skin breakdown  Description: 1. Monitor for areas of redness and/or skin breakdown  2. Assess vascular access sites hourly  3. Every 4-6 hours minimum:  Change oxygen saturation probe site  4. Every 4-6 hours:  If on nasal continuous positive airway pressure, respiratory therapy assess nares and determine need for appliance change or resting period.   Outcome: Progressing     Problem: ABCDS Injury Assessment  Goal: Absence of physical injury  Outcome: Progressing     Problem: Safety - Adult  Goal: Free from fall injury  Outcome: Progressing     Problem: Chronic Conditions and Co-morbidities  Goal: Patient's chronic conditions and co-morbidity symptoms are monitored and maintained or improved  Outcome: Progressing  Flowsheets (Taken 11/9/2022 2000)  Care Plan - Patient's Chronic Conditions and Co-Morbidity Symptoms are Monitored and Maintained or Improved: Monitor and assess patient's chronic conditions and comorbid symptoms for stability, deterioration, or improvement     Problem: Respiratory - Adult  Goal: Achieves optimal ventilation and oxygenation  Outcome: Progressing  Flowsheets (Taken 11/9/2022 2000)  Achieves optimal ventilation and oxygenation:   Assess for changes in respiratory status   Assess for changes in mentation and behavior     Problem: Cardiovascular - Adult  Goal: Maintains optimal cardiac output and hemodynamic stability  Outcome: Progressing  Flowsheets (Taken 11/9/2022 2000)  Maintains optimal cardiac output and hemodynamic stability: Monitor blood pressure and heart rate  Goal: Absence of cardiac dysrhythmias or at baseline  Outcome: Progressing  Flowsheets (Taken 11/9/2022 2000)  Absence of cardiac dysrhythmias or at baseline: Monitor cardiac rate and rhythm     Problem: Metabolic/Fluid and Electrolytes - Adult  Goal: Hemodynamic stability and optimal renal function maintained  Outcome: Progressing  Flowsheets (Taken 11/9/2022 2000)  Hemodynamic stability and optimal renal function maintained: Monitor labs and assess for signs and symptoms of volume excess or deficit     Problem: Hematologic - Adult  Goal: Maintains hematologic stability  Outcome: Progressing  Flowsheets (Taken 11/9/2022 2000)  Maintains hematologic stability: Assess for signs and symptoms of bleeding or hemorrhage     Problem: Neurosensory - Adult  Goal: Achieves stable or improved neurological status  Outcome: Progressing  Flowsheets (Taken 11/9/2022 2000)  Achieves stable or improved neurological status: Assess for and report changes in neurological status

## 2022-11-10 NOTE — PROGRESS NOTES
Progress Note    Updates  Sitting up in the chair. Asking for something to drink. No significant neurologic events overnight.       Active Ambulatory Problems     Diagnosis Date Noted    COPD exacerbation ([facility] 10/10/2015    Pneumonia 10/10/2015    Acute respiratory failure ([facility] 10/10/2015    Acute hypoxemic respiratory failure ([facility] 10/10/2015    Acute on chronic respiratory failure with hypercapnia (HCC) 10/10/2015    Acute pulmonary edema ([facility] 10/10/2015    COPD exacerbation ([facility] 10/10/2015    Pneumonia of both lungs due to infectious organism     Acute respiratory failure with hypoxia and hypercapnia (HCC)     Fracture of upper arm 04/28/2016    Closed fracture of humerus 06/29/2016    Tobacco abuse     Vitamin D deficiency      Past Medical History:   Diagnosis Date    COPD, mild (HCC)     Depression     Hypercholesteremia     Hypertension      Current Facility-Administered Medications:     budesonide (PULMICORT) nebulizer suspension 500 mcg, 0.5 mg, Nebulization, BID, SAULO Villa - CNP, 500 mcg at 11/10/22 0800    sodium chloride (Inhalant) 3 % nebulizer solution 15 mL, 15 mL, Nebulization, TID, Shilpa Drake APRN - CNP, 15 mL at 11/10/22 0759    [Held by provider] carvedilol (COREG) tablet 3.125 mg, 3.125 mg, Oral, BID WC, SAULO Tinsley CNP    fentaNYL (SUBLIMAZE) injection 25 mcg, 25 mcg, IntraVENous, Q2H PRN, SAULO Smith - CNP, 25 mcg at 11/10/22 0902    LORazepam (ATIVAN) injection 0.25 mg, 0.25 mg, IntraVENous, Q4H PRN, SAULO Smith - CNP, 0.25 mg at 11/10/22 0158    ipratropium-albuterol (DUONEB) nebulizer solution 1 ampule, 1 ampule, Inhalation, 6 times per day, Lisbeth Hunt MD, 1 ampule at 11/10/22 0800    traMADol (ULTRAM) tablet 50 mg, 50 mg, Oral, Q6H PRN, Hiwot Chaudhry MD    nicotine (NICODERM CQ) 14 MG/24HR 1 patch, 1 patch, TransDERmal, Daily, Hiwot Chaudhry MD, 1 patch at 11/10/22 0755    ondansetron (ZOFRAN-ODT) disintegrating tablet 4 mg, 4 mg, Oral, Q8H PRN **OR** ondansetron (ZOFRAN) injection 4 mg, 4 mg, IntraVENous, Q6H PRN, Brant Parry MD    polyethylene glycol (GLYCOLAX) packet 17 g, 17 g, Oral, Daily PRN, Brant Parry MD    aspirin EC tablet 81 mg, 81 mg, Oral, Daily **OR** aspirin suppository 300 mg, 300 mg, Rectal, Daily, Brant Parry MD, 300 mg at 11/10/22 0754    atorvastatin (LIPITOR) tablet 40 mg, 40 mg, Oral, Nightly, Brant Parry MD    lidocaine 4 % external patch 1 patch, 1 patch, TransDERmal, Daily, Christopher Campoverde MD, 1 patch at 11/10/22 0754    doxycycline (VIBRAMYCIN) 100 mg in dextrose 5 % 100 mL IVPB, 100 mg, IntraVENous, Q12H, Graylon Calico, APRN - CNP, Stopped at 11/10/22 0859    methylPREDNISolone sodium (SOLU-MEDROL) injection 40 mg, 40 mg, IntraVENous, Daily, Graylon Calico, APRN - CNP, 40 mg at 11/10/22 0756    perflutren lipid microspheres (DEFINITY) injection 1.5 mL, 1.5 mL, IntraVENous, ONCE PRN, Brant Parry MD    famotidine (PEPCID) 20 mg in sodium chloride (PF) 0.9 % 10 mL injection, 20 mg, IntraVENous, Daily, Graylon Calico, APRN - CNP, 20 mg at 11/10/22 0755    Roflumilast (DALIRESP) tablet 500 mcg, 500 mcg, Oral, Daily, Raymond MARTÍN MeredithDeep, DO, 500 mcg at 11/06/22 0942    sodium chloride flush 0.9 % injection 5-40 mL, 5-40 mL, IntraVENous, 2 times per day, Raymond M Deep, DO, 10 mL at 11/10/22 0756    sodium chloride flush 0.9 % injection 5-40 mL, 5-40 mL, IntraVENous, PRN, Raymond MARTÍN Hawkni, DO    0.9 % sodium chloride infusion, , IntraVENous, PRN, Raymond MARTÍN MeredithDeep, DO    acetaminophen (TYLENOL) tablet 650 mg, 650 mg, Oral, Q6H PRN **OR** acetaminophen (TYLENOL) suppository 650 mg, 650 mg, Rectal, Q6H PRN, Raymond Adame DO    perflutren lipid microspheres (DEFINITY) injection 1.5 mL, 1.5 mL, IntraVENous, ONCE PRN, Raymond Adame DO    [Held by provider] furosemide (LASIX) injection 40 mg, 40 mg, IntraVENous, BID, Raymond Adame DO, 40 mg at 11/06/22 0932 guaiFENesin-dextromethorphan (ROBITUSSIN DM) 100-10 MG/5ML syrup 5 mL, 5 mL, Oral, Q4H PRN, Raymond M Deep, DO, 5 mL at 11/06/22 0932    miconazole (MICOTIN) 2 % powder, , Topical, BID, Raymond M Deep, DO, Given at 11/10/22 0808    ipratropium-albuterol (DUONEB) nebulizer solution 1 ampule, 1 ampule, Inhalation, Q4H PRN, Raymond M Deep, DO, 1 ampule at 11/09/22 0834    clonazePAM (KLONOPIN) tablet 0.25 mg, 0.25 mg, Oral, Q12H PRN, Víctor Coppola MD, 0.25 mg at 11/06/22 1415    Exam  Blood pressure (!) 167/78, pulse (!) 105, temperature 97.7 °F (36.5 °C), temperature source Axillary, resp. rate 29, height 5' 3\" (1.6 m), weight 171 lb 1.2 oz (77.6 kg), SpO2 98 %, not currently breastfeeding. Constitutional    Vital signs: BP, HR, and RR reviewed   General alert, no distress  Eyes: unable to visualize the fundi  Cardiovascular: no peripheral edema. Psychiatric: cooperative with examination, no psychotic behavior noted. Neurologic  Mental status:   orientation to person, place. Attention intact as able to attend well to the exam     Language fluent in conversation   Comprehension intact; follows simple commands  Cranial nerves:   CN2: visual fields full   CN 3,4,6: she is unable to cross midline to the R. Pupils are equal, round, reactive bilaterally. CN7: L facial weakness and dysarthria. CN8: hearing grossly intact  CN12: tongue midline with protrusion  Strength: mild LUE weakness. Seems to have some neglect. Sensory: light touch intact in all 4 extremities. + extinction. Cerebellar/coordination: finger nose finger normal without ataxia on R. Having trouble following this command on the L. Tone: normal in all 4 extremities  Gait: deferred at this time for safety. ROS  Constitutional- No weight loss or fevers  Eyes- No diplopia. No photophobia. Ears/nose/throat- No dysphagia. + Dysarthria  Cardiovascular- No palpitations. No chest pain  Respiratory- No dyspnea.  No Cough  Gastrointestinal- No Abdominal pain. No Vomiting. Genitourinary- No incontinence. No urinary retention  Musculoskeletal- No myalgia. No arthralgia  Skin- No rash. No easy bruising. Psychiatric- No depression. No anxiety  Endocrine- No diabetes. No thyroid issues. Hematologic- No bleeding difficulty. No fatigue  Neurologic- + weakness. No Headache. Labs  Glucose 123  Na 152  K 3.9  BUN 73  Cr 1.8  Mg 2.40    CHOLESTEROL, TOTAL 205 (H)   HDL Cholesterol 35 (L)   LDL DIRECT, (H)   Triglycerides 433 (H)     HgA1c 5.2    WBC 7.4K  Hg 11.9  Platelets 823    Blood cultures NG  Flu negative  COVID negative    Studies  MRI brain w/o 11/8/22, independently reviewed  Impression   Severely motion degraded exam.   Acute infarct within the right frontal lobe as well as acute microinfarcts   within the left occipital lobe. CTA head/neck 11/6/22, independently reviewed  Impression   Severely motion degraded exam without large vessel occlusion in the head or   neck. Carotid US 11/9/22  CELINA < 50% stenosis  Mild plaque in the proximal R ICA. R vertebral artery normal antegrade flow. LICA 1-63% stenosis. L vertebral artery normal antegrade flow. TTE 11/7/22   Normal LV size and wall motion. EF is 60%. Indeterminate diastolic function. Trivial mitral& tricuspid regurgitation is present. Left atrium is of normal size. Very mild aortic stenosis. Mean gradient 15 mmHg. RV appears dilated . Impression/Recommendations  Acute embolic stroke in the setting of cardiac arrest and ? NSTEMI. Clinically she has made some progress though still symptomatic. Known stroke risk factors include HLD, smoking, and ?HTN. MRA head w/o when feasible. 81 mg asa daily. High intensity statin. LDL < 70.    BP < 160/90 w/ gradual reduction. Smoking cessation. Continue on telemetry. If negative would recommend a prolonged cardiac event monitor. Continue rehabilitation efforts. Will follow. Discussed w/ LITTLE Jenkins.       Honey Kennedy NP  [facility]    A copy of this note was provided for Dr Domingo Baugh MD

## 2022-11-10 NOTE — PROCEDURES
Facility/Department: [facility] ICU  MODIFIED BARIUM SWALLOW EVALUATION    Patient: Eugene Nash   : 1950   MRN: [id number]      Evaluation Date: 11/10/2022   Admitting Diagnosis: Pulmonary nodule [R91.1]  NSTEMI (non-ST elevated myocardial infarction) ([facility] [I21.4]  Pulmonary edema with congestive heart failure ([facility] [I50.1]  Acute hypoxemic respiratory failure (HCC) [J96.01]  Congestive heart failure, unspecified HF chronicity, unspecified heart failure type ([facility] [I50.9]  Treatment Diagnosis: Dysphagia   Pain: none reported                      Ordering MD: Anna Nelson  Radiologist: Devon Miller  Date of Onset: 2022  Date of Evaluation: 11/10/2022  Type of Study: Modified Barium Swallowing Study (MBS)  Diet Prior to Study: NPO  Reason for referral/HPI: medical team concern for dysphagia  Pt complaint: pt very thirsty and wants to drink. No insight to deficits  Pertinent Medical History:   H&P:   Pt is a 72 y/o female admitted with shortness of breath/acute respiratory with hypoxia and hypercapnia with subsequent intubation, cardiac arrest, COPD with acute exacerbation, PEYTON, chronic diastolic heart failure, lung nodues. Upon extubation pt noted to have left sided facial droop, stroke protocol initiated and pt found to have acute CVA. Imaging:  Chest X-ray: 2022  Impression   Endotracheal tube and nasogastric tube have been removed. Right PICC line is   still present. Patient is rotated to the right distorting the mediastinum and grecia. There   does appear to be increasing opacities in the right lower chest and possible   volume loss. May represent a combination of edema and atelectasis. Head CT: 2022  Impression   Findings suggestive of right MCA distribution stroke. Consider CT angiogram   or MRI as clinically warranted. Negative for acute bleed midline shift mass effect.       Brain MRI: 2022  Impression   Severely motion degraded exam.       Acute infarct within the right frontal lobe as well as acute microinfarcts   within the left occipital lobe. MBSS Assessment Impression:  MBSS Assessment Impression:  Modified Barium Swallow evaluation completed on 11/10/2022. Patient presents with moderate to severe oropharyngeal dysphagia. Oral phase of swallow appeared moderate to severe, characterized by reduced lingual control, reduced bolus cohesion, loss of bolus control and left sided pocketing with solids. Swallow initiation appeared severely delayed triggering at the level of the pyriforms. Hyolaryngeal elevation/excursion appeared reduced resulting in incomplete laryngeal closure. This resulted in deep penetration with nectar liquids and merced aspiration of thin. Pt did have cough response to aspiration of thin liquid. Tongue base retraction and pharyngeal constriction were reduced resulting in mild pharyngeal residue. UES opening appeared [place] [place]. Compensatory strategies that were effective: pt unable to complete compensatory strategies d/t impulsivity and reduced cognition. Aspiration/Penetration Risk: pt is a moderate aspiration risk d/t impulsivity, reduced insight and no carryover.     Recommendations:    Diet Level:   Dysphagia I Puree  with Moderately (honey) thick liquids   Medication: Meds crushed as able in puree     Strategies: Check for pocketing of food L, Upright as possible with all PO intake , Assist Feed , External Pacing , Small bites/sips , Eat/feed slowly, Remain upright 30-45 min     Treatments: Ongoing dysphagia therapy with SLP   Goals:  Recommend ongoing SLP to address dysphagia with goals per primary treating SLP   Pt will functionally tolerate recommended diet level with use of recommended compensatory strategies with no s/s of aspiration/penetration    Pt/family will demonstrate understanding of results Modified Barium Swallow Study, risk for aspiration, rationale for diet level and compensatory strategies  Pt will demonstrate improved sensory motor function via targeted exercises and appropriate treatment modalities   Pt will tolerate advance to least restrictive diet as evidenced by repeat instrumental swallow study as needed    Dysphagia Prognosis:   Cognitive deficit, Impulsivity, Limited safety awareness, and Limited insight into deficits    TEST DATA:  Vision: WFL for assessment  Hearing: WFL for assessment  Behavioral/ cognitive/communication: impulsive, reduced insight, reduced self monitoring, reduced comprehension  Respiratory Status: 4L via nasal cannula  Pain: none reported  Positioning: upright in MBS chair  Consistencies given: thin liquids, mildly (nectar) thick liquids , moderately (honey) thick liquids , puree , and soft solids    Oral Phase  Decreased labial seal   Anterior spillage of all liquid consistencies  Decreased bolus control   Weak Lingual Manipulation  Lingual Pumping  Premature bolus loss   Prolonged/impaired mastication   Left pocketing with moist solids  Oral residue     Pharyngeal Phase  Delayed swallow initiation   Premature Spillage to Pyriform  Decreased hyolaryngeal excursion   Decreased epiglottic inversion   Decreased laryngeal vestibular closure; resulting in aspiration of thin and deep penetration of nectar   Decreased pharyngeal contraction   Decreased tongue base retraction   Pharyngeal residue: Mild    Penetration/Aspiration Scores across consistencies   CONSISTENCY  Pen/Asp rating Description    Thin   7) Material enters the airway, passes below the vocal folds, and is not ejected from the trachea despite effort    Mildly (nectar) thick   3) Material enters the airway, remains above the vocal folds, and is not ejected from the airway     Moderately (honey) thick   1) Material does not enter the airway     Puree   1) Material does not enter the airway     Soft Solid   N/A - consistency not provided  Soft solid was removed from oral cavity prior to initiating swallow   Regular Solid   N/A - consistency not provided Upper Esophageal Phase  Unremarkable    Following Evaluation:  Provided education regarding role of SLP, results of assessment, recommendations and general speech pathology plan of care.    [] Pt verbalized understanding and agreement   [x] Pt requires ongoing learning   [x] No evidence of comprehension     Timed Code Treatment: 0  Total Treatment Time: 19    Time in: 9271  Time out: 1107    Signature:  Saintclair Ip  [facility]  Speech-Language Pathologist  11/10/22  11:07am

## 2022-11-10 NOTE — PLAN OF CARE
Problem: Discharge Planning  Goal: Discharge to home or other facility with appropriate resources  11/10/2022 0728 by Gilda Kaba RN  Outcome: Progressing  11/10/2022 0532 by Rashel Calderon RN  Outcome: Progressing  Flowsheets (Taken 11/9/2022 2000)  Discharge to home or other facility with appropriate resources: Identify barriers to discharge with patient and caregiver     Problem: Skin/Tissue Integrity  Goal: Absence of new skin breakdown  Description: 1. Monitor for areas of redness and/or skin breakdown  2. Assess vascular access sites hourly  3. Every 4-6 hours minimum:  Change oxygen saturation probe site  4. Every 4-6 hours:  If on nasal continuous positive airway pressure, respiratory therapy assess nares and determine need for appliance change or resting period.   11/10/2022 0728 by Gilda Kaba RN  Outcome: Progressing  11/10/2022 0532 by Rashel Calderon RN  Outcome: Progressing     Problem: ABCDS Injury Assessment  Goal: Absence of physical injury  11/10/2022 0728 by Gilda Kaba RN  Outcome: Progressing  11/10/2022 0532 by Rashel Calderon RN  Outcome: Progressing     Problem: Safety - Adult  Goal: Free from fall injury  11/10/2022 0728 by Gilda Kaba RN  Outcome: Progressing  11/10/2022 0532 by Rashel Calderon RN  Outcome: Progressing     Problem: Chronic Conditions and Co-morbidities  Goal: Patient's chronic conditions and co-morbidity symptoms are monitored and maintained or improved  11/10/2022 0728 by Gilda Kaba RN  Outcome: Progressing  11/10/2022 0532 by Rashel Calderon RN  Outcome: Progressing  Flowsheets (Taken 11/9/2022 2000)  Care Plan - Patient's Chronic Conditions and Co-Morbidity Symptoms are Monitored and Maintained or Improved: Monitor and assess patient's chronic conditions and comorbid symptoms for stability, deterioration, or improvement     Problem: Pain  Goal: Verbalizes/displays adequate comfort level or baseline comfort level  Outcome: Progressing Problem: Respiratory - Adult  Goal: Achieves optimal ventilation and oxygenation  11/10/2022 0728 by Cb Oconnor RN  Outcome: Progressing  11/10/2022 0532 by Trent Rocha RN  Outcome: Progressing  Flowsheets (Taken 11/9/2022 2000)  Achieves optimal ventilation and oxygenation:   Assess for changes in respiratory status   Assess for changes in mentation and behavior     Problem: Cardiovascular - Adult  Goal: Maintains optimal cardiac output and hemodynamic stability  11/10/2022 0728 by Cb Oconnor RN  Outcome: Progressing  11/10/2022 0532 by Trent Rocha RN  Outcome: Progressing  Flowsheets (Taken 11/9/2022 2000)  Maintains optimal cardiac output and hemodynamic stability: Monitor blood pressure and heart rate  Goal: Absence of cardiac dysrhythmias or at baseline  11/10/2022 0728 by Cb Oconnor RN  Outcome: Progressing  11/10/2022 0532 by Trent Rocha RN  Outcome: Progressing  Flowsheets (Taken 11/9/2022 2000)  Absence of cardiac dysrhythmias or at baseline: Monitor cardiac rate and rhythm     Problem: Metabolic/Fluid and Electrolytes - Adult  Goal: Electrolytes maintained within normal limits  Outcome: Progressing  Flowsheets (Taken 11/9/2022 2000 by Trent Rocha RN)  Electrolytes maintained within normal limits:   Monitor labs and assess patient for signs and symptoms of electrolyte imbalances   Administer electrolyte replacement as ordered  Goal: Hemodynamic stability and optimal renal function maintained  11/10/2022 0728 by Cb Oconnor RN  Outcome: Progressing  11/10/2022 0532 by Trent Rocha RN  Outcome: Progressing  Flowsheets (Taken 11/9/2022 2000)  Hemodynamic stability and optimal renal function maintained: Monitor labs and assess for signs and symptoms of volume excess or deficit     Problem: Hematologic - Adult  Goal: Maintains hematologic stability  11/10/2022 0728 by Cb Oconnor RN  Outcome: Progressing  11/10/2022 0532 by Trent Rocha RN  Outcome: Progressing  Flowsheets (Taken 11/9/2022 2000)  Maintains hematologic stability: Assess for signs and symptoms of bleeding or hemorrhage     Problem: Neurosensory - Adult  Goal: Achieves stable or improved neurological status  11/10/2022 0728 by Gilda Kaba RN  Outcome: Progressing  11/10/2022 0532 by Rashel Calderon RN  Outcome: Progressing  Flowsheets (Taken 11/9/2022 2000)  Achieves stable or improved neurological status: Assess for and report changes in neurological status  Goal: Achieves maximal functionality and self care  Outcome: Progressing  Flowsheets (Taken 11/9/2022 2000 by Rashel Calderon RN)  Achieves maximal functionality and self care: Encourage and assist patient to increase activity and self care with guidance from physical therapy/occupational therapy

## 2022-11-10 NOTE — PROGRESS NOTES
Pulmonary Progress Note    Date of Admission: 11/6/2022   LOS: 4 days     CC:  Chief Complaint   Patient presents with    Shortness of Breath           Assessment/Plan          Acute on chronic hypoxemic respiratory failure with SPO2 less than 90%  -Successfully extubated yesterday  -reMains on supplemental oxygen    Aspiration into airway/dysphagia  -Failed MBS, aspiration precautions  -Airway clearance, Acapella/I-S if able, 3% nebs  -PT/OT     Acute exacerbation of COPD  -Pulmicort, duo nebs, Daliresp  -Daily Solu-Medrol    CVA  -Neurology following  Cardiac arrest  Peridex  Pepcid  Heparin drip for possible NSTEMI    Patient is a high risk due to tenuous respiratory status    HPI/Subjective  No acute events overnight. More awake and alert. Failed MBS. Needs modified diet    ROS:   No nausea  No Vomiting  No chest pain      Intake/Output Summary (Last 24 hours) at 11/10/2022 1209  Last data filed at 11/10/2022 1000  Gross per 24 hour   Intake 98.13 ml   Output 1480 ml   Net -1381.87 ml         PHYSICAL EXAM:   Blood pressure (!) 153/70, pulse (!) 103, temperature 97.7 °F (36.5 °C), temperature source Axillary, resp. rate 28, height 5' 3\" (1.6 m), weight 171 lb 1.2 oz (77.6 kg), SpO2 96 %, not currently breastfeeding.'  Gen:  No acute distress. Eyes: PERRL. Anicteric sclera. No conjunctival injection. ENT: No discharge. Posterior oropharynx clear. External appearance of ears and nose normal.  Neck: Trachea midline. No mass   Resp:  No crackles. No wheezes. No rhonchi. No dullness on percussion. CV: Regular rate. Regular rhythm. No murmur or rub. No edema. GI: Soft, Non-tender. Non-distended. +BS  Skin: Warm, dry, w/o erythema. Lymph: No cervical or supraclavicular LAD. M/S: No cyanosis. No clubbing. Neuro:  no focal neurologic deficit. Moves all extremities  Psych: Awake and alert,  Mood stable.       Medications:    Scheduled Meds:   amLODIPine  5 mg Oral Daily    budesonide  0.5 mg Nebulization BID    sodium chloride (Inhalant)  15 mL Nebulization TID    [Held by provider] carvedilol  3.125 mg Oral BID WC    ipratropium-albuterol  1 ampule Inhalation 6 times per day    nicotine  1 patch TransDERmal Daily    aspirin  81 mg Oral Daily    Or    aspirin  300 mg Rectal Daily    atorvastatin  40 mg Oral Nightly    lidocaine  1 patch TransDERmal Daily    doxycycline (VIBRAMYCIN) IV  100 mg IntraVENous Q12H    methylPREDNISolone  40 mg IntraVENous Daily    famotidine (PEPCID) injection  20 mg IntraVENous Daily    Roflumilast  500 mcg Oral Daily    sodium chloride flush  5-40 mL IntraVENous 2 times per day    miconazole   Topical BID       Continuous Infusions:   sodium chloride         PRN Meds:  fentanNYL, LORazepam, traMADol, ondansetron **OR** ondansetron, polyethylene glycol, perflutren lipid microspheres, sodium chloride flush, sodium chloride, acetaminophen **OR** acetaminophen, perflutren lipid microspheres, guaiFENesin-dextromethorphan, ipratropium-albuterol, clonazePAM    Labs reviewed:  CBC:   Recent Labs     11/08/22  0533 11/09/22  0450 11/10/22  0510   WBC 9.5 9.7 7.4   HGB 11.0* 11.8* 11.9*   HCT 32.4* 35.3* 35.6*   MCV 92.5 93.0 93.5    227 203     BMP:   Recent Labs     11/08/22  0533 11/09/22  0450 11/10/22  0510    146* 152*   K 3.4* 4.2 3.9    105 107   CO2 23 27 28   PHOS 3.9 3.7 3.7   BUN 82* 81* 73*   CREATININE 2.6* 2.1* 1.8*     LIVER PROFILE:   No results for input(s): AST, ALT, LIPASE, BILIDIR, BILITOT, ALKPHOS in the last 72 hours. Invalid input(s): AMYLASE,  ALB    PT/INR:   No results for input(s): PROTIME, INR in the last 72 hours. APTT:   Recent Labs     11/07/22 2017 11/08/22 0238   APTT 58.4* >180.0*     UA:No results for input(s): NITRITE, COLORU, PHUR, LABCAST, WBCUA, RBCUA, MUCUS, TRICHOMONAS, YEAST, BACTERIA, CLARITYU, SPECGRAV, LEUKOCYTESUR, UROBILINOGEN, BILIRUBINUR, BLOODU, GLUCOSEU, AMORPHOUS in the last 72 hours.     Invalid input(s): KETONESU  No results for input(s): PH, PCO2, PO2 in the last 72 hours. Films:  Radiology Review:  Pertinent images / reports were reviewed as a part of this visit. FL MODIFIED BARIUM SWALLOW W VIDEO  Narrative: EXAMINATION:  MODIFIED BARIUM SWALLOW WAS PERFORMED IN CONJUNCTION WITH SPEECH PATHOLOGY  SERVICES    TECHNIQUE:  Under fluoroscopic evaluation cineradiography/videoradiography recordings  were performed in conjunction with the speech-language pathologist (SLP). Various liquid, solid and/or semi-solid barium preparations were used to  assess swallowing function. FLUOROSCOPY DOSE AND TYPE OR TIME AND EXPOSURES:  1.6 minutes fluoroscopy    Total fluoroscopic dose 5.21 mGy    1 spot film    COMPARISON:  None    HISTORY:  ORDERING SYSTEM PROVIDED HISTORY: assess swallow  TECHNOLOGIST PROVIDED HISTORY:  Reason for exam:->assess swallow    FINDINGS:  Aspiration and penetration was seen  Impression: Aspiration and penetration was seen    Please see separate speech pathology report for full discussion of findings  and recommendations. RECOMMENDATIONS:  Unavailable          Access  CVC   Arterial          PICC        PICC Triple Lumen 11/06/22 Right Brachial (Active)   Central Line Being Utilized Yes 11/10/22 1000   Criteria for Appropriate Use Hemodynamically unstable, requiring monitoring lines, vasopressors, or volume resuscitation 11/10/22 1000   Site Assessment Clean, dry & intact 11/10/22 1000   Phlebitis Assessment No symptoms 11/10/22 1000   Infiltration Assessment 0 11/10/22 1000   Extremity Circumference (cm) 31 cm 11/06/22 1926   External Catheter Length (cm) 0 cm 11/06/22 1926   Proximal Lumen Color/Status Red; Infusing 11/10/22 1000   Medial Lumen Status White; Infusing 11/10/22 1000   Distal Lumen Color/Status Gray;Capped;Normal saline locked 11/10/22 1000   Line Care Connections checked and tightened;Cap changed 11/10/22 1000   Alcohol Cap Used Yes 11/10/22 1000   Date of Last Dressing Change 11/06/22 11/10/22 1000   Dressing Type Transparent; Antimicrobial;Securing device 11/10/22 1000   Dressing Status Clean, dry & intact 11/10/22 1000   Dressing Intervention New 11/09/22 0430   Number of days: 3       CVC                  Joy  Urinary Catheter 11/06/22 Joy (Active)   Catheter Indications Need for fluid volume management of the critically ill patient in a critical care setting 11/10/22 0800   Site Assessment No urethral drainage 11/10/22 0800   Urine Color Yellow 11/10/22 0800   Urine Appearance Clear 11/10/22 0800   Urine Odor Other (Comment) 11/09/22 1600   Collection Container Standard 11/10/22 0800   Securement Method Securing device (Describe) 11/10/22 0800   Catheter Care Completed Yes 11/10/22 0400   Catheter Best Practices  Drainage tube clipped to bed;Catheter secured to thigh; Tamper seal intact; Bag below bladder;Bag not on floor; Lack of dependent loop in tubing;Drainage bag less than half full 11/10/22 0800   Status Draining;Patent 11/10/22 0800   Output (mL) 30 mL 11/10/22 1000   Number of days: 3         Thank you for this consult,    Kya Avina MD  [facility] Pulmonary, Critical Care, and Sleep Medicine

## 2022-11-10 NOTE — CARE COORDINATION
[facility]  Palliative Care   Progress Note    NAME:  [facility] RECORD NUMBER:  [id number]  AGE: 71 y.o. GENDER: female  : 1950  TODAY'S DATE:  11/10/2022    Subjective: Patient up in chair able to answer questions. Alert and oriented. Objective:    Vitals:    11/10/22 1500   BP:    Pulse: (!) 109   Resp: (!) 31   Temp:    SpO2: 95%     Lab Results   Component Value Date    WBC 7.4 11/10/2022    HGB 11.9 (L) 11/10/2022    HCT 35.6 (L) 11/10/2022    MCV 93.5 11/10/2022     11/10/2022     Lab Results   Component Value Date    CREATININE 1.8 (H) 11/10/2022    BUN 73 (H) 11/10/2022     (H) 11/10/2022    K 3.9 11/10/2022     11/10/2022    CO2 28 11/10/2022     Lab Results   Component Value Date     (H) 2022     (H) 2022    ALKPHOS 181 (H) 2022    BILITOT 0.5 2022       Plan: I had family meeting with patient and daughter today explained events that have happened since admission, they expressed understanding. We discussed code status and the patient does not want to go back on vent nor does she want chest compression/shocking she wants to die a peaceful death. Her daughter is understandably upset but agrees with her mother decision. Dr Beckie Mancera, Dr Jimbo Crenshaw and Ezra Manley bedside nurse informed orders noted. Code Status: Limited  Discharge Environment:  [] Hospice Consult Agency:  [] Inpatient Hospice    [] Home with [address] [place] [address]   [] ECF with Hospice  [] ECF skilled care with Hospice to follow   [] Other:    Teaching Time:  0hours  30 min     I will continue to follow Ms. Almanzar's care as needed. Thank you for allowing me to participate in the care of Ms. Almanzar .      Electronically signed by Mary Restrepo RN, BSN,CHPN on 11/10/2022 at 3:09 PM  [address] [place] [address]  Office: [phone]

## 2022-11-10 NOTE — PROGRESS NOTES
[facility]                                               Progress note    CC: SOB, cardiac arrest.  Summary:   Daija Warner is being seen by nephrology for Acute kidney injury (PEYTON). She is a 71 y.o. female with a PMH significant for COPD (continues to smoke), CAD, CVA, hypertension who presented to the ED 11/6/2022 with complaints progressively worsening shortness of breath. She is not known to have CHF prior to this admission. On 11/6/2022 she went into cardiac arrest with asystole needing CPR for 3 minutes. Since then her Cr has increased from 1.2 at admission to 2.5 at time of consult. Interval History  - seen at bedside  - /78  - Cr improved to 1.8 today  - hypernatremia worse, Na today 152  - made 1964 mL urine yesterday with lasix IV 80 mg once. .  - BP quite high  - mucus membranes really dry    Plan:   - would hold lasix today  - encourage free water intake PO today to address hypernatremia  - will avoid giving IV hypotonic fluids  - resume home amlodipine 5 mg daily for BP control.  - PEYTON improving but still not back at baseline. Ayana Mckeon MD  [facility]  Office: [phone]    Assessment:   PEYTON:  - baseline Cr 0.8-1.0  - Cr on admission was at baseline but she unfortunately had PEA cardiac arrest.  - Cr increased from 1.2 to 2.5 within 24 hours  - oligoanuric now  - PEYTON likely 2/2 ATN from hypoperfusion. CHF:  - admitted with SOB, elevated BNP  - pulmonary edema on chest imaging.  - was getting diuresis after admission  - echo 11/7/2022 with LVEF 60%, indeterminate diastolic function, RV dilated. ROS:   Positives Listed Bold. All other remaining systems are negative. Constitutional:  fever, chills, weakness, weight change, fatigue,      Skin:  rash, pruritus, hair loss, bruising, dry skin, petechiae. Head, Face, Neck   headaches, swelling,  cervical adenopathy.      Respiratory: shortness of breath, cough, or wheezing  Cardiovascular: chest pain, palpitations, dizzy, edema  Gastrointestinal: nausea, vomiting, diarrhea, constipation,belly pain    Yellow skin, blood in stool  Musculoskeletal:  back pain, muscle weakness, gait problems,       joint pain or swelling. Genitourinary:  dysuria, poor urine flow, flank pain, blood in urine  Neurologic:  vertigo, TIA'S, syncope, seizures, focal weakness  Psychosocial:  insomnia, anxiety, or depression. Additional positive findings: -      PMH:   Past medical history, surgical history, social history, family history are reviewed and updated as appropriate. Reviewed current medication list.   Allergies reviewed and updated as needed. PE:   Vitals:    11/10/22 0900   BP: (!) 167/78   Pulse: (!) 105   Resp: 29   Temp:    SpO2: 98%       General appearance: in no acute distress, comfortable, communicative, awake   HEENT: no icterus, EOM intact, trachea midline. Neck : no masses, appears symmetrical and no JVD appreciated. Respiratory: Respiratory effort normal, bilateral equal chest rise. + wheeze, no crackles  Cardiovascular: Ausculation shows RRR and  no edema   Abdomen: abdomen is soft, non distended, no masses, no pain with palpation. Musculoskeletal:  no joint swelling, no deformity, strength grossly normal.   Skin: no rashes, no induration, no tightening, no jaundice   Neuro:   Follows commands, moves all extremities spontaneously       Lab Results   Component Value Date    CREATININE 1.8 (H) 11/10/2022    BUN 73 (H) 11/10/2022     (H) 11/10/2022    K 3.9 11/10/2022     11/10/2022    CO2 28 11/10/2022      Lab Results   Component Value Date    WBC 7.4 11/10/2022    HGB 11.9 (L) 11/10/2022    HCT 35.6 (L) 11/10/2022    MCV 93.5 11/10/2022     11/10/2022     Lab Results   Component Value Date    CALCIUM 10.0 11/10/2022    CAION 1.15 10/14/2015    PHOS 3.7 11/10/2022

## 2022-11-10 NOTE — PROGRESS NOTES
Progress Note  Admit Date: 11/6/2022      PCP: Dasha Patel MD     CC: F/U for shortness of breath    Days in hospital:  4    SUBJECTIVE / Interval History:    Tremendous anxiety today  Continues to yell that she wants real food. Awaits modified barium swallow test      Allergies  Patient has no known allergies. Medications    Scheduled Meds:   amLODIPine  5 mg Oral Daily    budesonide  0.5 mg Nebulization BID    sodium chloride (Inhalant)  15 mL Nebulization TID    [Held by provider] carvedilol  3.125 mg Oral BID WC    ipratropium-albuterol  1 ampule Inhalation 6 times per day    nicotine  1 patch TransDERmal Daily    aspirin  81 mg Oral Daily    Or    aspirin  300 mg Rectal Daily    atorvastatin  40 mg Oral Nightly    lidocaine  1 patch TransDERmal Daily    doxycycline (VIBRAMYCIN) IV  100 mg IntraVENous Q12H    methylPREDNISolone  40 mg IntraVENous Daily    famotidine (PEPCID) injection  20 mg IntraVENous Daily    Roflumilast  500 mcg Oral Daily    sodium chloride flush  5-40 mL IntraVENous 2 times per day    miconazole   Topical BID     Continuous Infusions:   sodium chloride         PRN Meds:  fentanNYL, LORazepam, traMADol, ondansetron **OR** ondansetron, polyethylene glycol, perflutren lipid microspheres, sodium chloride flush, sodium chloride, acetaminophen **OR** acetaminophen, perflutren lipid microspheres, guaiFENesin-dextromethorphan, ipratropium-albuterol, clonazePAM    Vitals    BP (!) 149/87   Pulse (!) 104   Temp 98.4 °F (36.9 °C) (Oral)   Resp 25   Ht 5' 3\" (1.6 m)   Wt 171 lb 1.2 oz (77.6 kg)   SpO2 94%   BMI 30.30 kg/m²     Exam:    Gen: anxious  Eyes: PERRL. No sclera icterus. No conjunctival injection. ENT: No discharge. Pharynx clear. External appearance of ears and nose normal.  Neck: Trachea midline. No obvious mass. Resp: course rhonch  CV: Regular rate. Regular rhythm. No murmur or rub. No edema. GI: Non-tender. Non-distended. No hernia.    Skin: Warm, dry, normal texture and turgor. No nodule on exposed extremities. Lymph: No cervical LAD. No supraclavicular LAD. M/S: No cyanosis. No clubbing. No joint deformity. Neuro: moving arms and legs. Dysarthric speech. Left facial droop. Left sided weakness  Psych: anxious     Data    LABS  CBC:   Recent Labs     11/08/22  0533 11/09/22  0450 11/10/22  0510   WBC 9.5 9.7 7.4   HGB 11.0* 11.8* 11.9*   HCT 32.4* 35.3* 35.6*   MCV 92.5 93.0 93.5    227 203       BMP:   Recent Labs     11/08/22 0533 11/09/22  0450 11/10/22  0510    146* 152*   K 3.4* 4.2 3.9    105 107   CO2 23 27 28   PHOS 3.9 3.7 3.7   BUN 82* 81* 73*   CREATININE 2.6* 2.1* 1.8*   GLUCOSE 100* 112* 123*       POC GLUCOSE:    Recent Labs     11/08/22  0533 11/08/22  1711 11/09/22  0810 11/09/22  1213 11/09/22  1610   POCGLU 115* 141* 125* 148* 168*       LIVER PROFILE:   Recent Labs     11/08/22 0533 11/09/22  0450 11/10/22  0510   LABALBU 3.5 4.3 4.3       PT/INR:   No results for input(s): PROTIME, INR in the last 72 hours. APTT:   Recent Labs     11/07/22 2017 11/08/22  0238   APTT 58.4* >180.0*       UA:No results for input(s): NITRITE, COLORU, PHUR, LABCAST, WBCUA, RBCUA, MUCUS, TRICHOMONAS, YEAST, BACTERIA, CLARITYU, SPECGRAV, LEUKOCYTESUR, UROBILINOGEN, BILIRUBINUR, BLOODU, GLUCOSEU, KETUA, AMORPHOUS in the last 72 hours. Microbiology:  Wound Culture: No results for input(s): WNDABS, ORG in the last 72 hours. Invalid input(s):  LABGRAM  Nasal Culture: No results for input(s): ORG, MRSAPCR in the last 72 hours. Blood Culture: No results for input(s): BC, BLOODCULT2 in the last 72 hours. Fungal Culture:   No results for input(s): FUNGSM in the last 72 hours. No results for input(s): FUNCXBLD in the last 72 hours. CSF Culture:  No results for input(s): COLORCSF, APPEARCSF, CFTUBE, CLOTCSF, WBCCSF, RBCCSF, NEUTCSF, NUMCELLSCSF, LYMPHSCSF, MONOCSF, GLUCCSF, VOLCSF in the last 72 hours.   Respiratory Culture:  No results for input(s): Vivek Emmanuel in the last 72 hours. AFB:No results for input(s): AFBSMEAR in the last 72 hours. Urine Culture  No results for input(s): LABURIN in the last 72 hours. RADIOLOGY:    FL MODIFIED BARIUM SWALLOW W VIDEO   Final Result   Aspiration and penetration was seen      Please see separate speech pathology report for full discussion of findings   and recommendations. RECOMMENDATIONS:   Unavailable         Vascular carotid duplex bilateral         XR CHEST PORTABLE   Final Result   Endotracheal tube and nasogastric tube have been removed. Right PICC line is   still present. Patient is rotated to the right distorting the mediastinum and grecia. There   does appear to be increasing opacities in the right lower chest and possible   volume loss. May represent a combination of edema and atelectasis. MRI BRAIN WO CONTRAST   Final Result   Severely motion degraded exam.      Acute infarct within the right frontal lobe as well as acute microinfarcts   within the left occipital lobe. The findings were sent to the Radiology Results [address] at 6:26   pm on 11/8/2022 to be communicated to a licensed caregiver. CT HEAD WO CONTRAST   Final Result   Addendum (preliminary) 1 of 1   ADDENDUM:   Critical results were called by Dr. Gema Ruiz to [place] on 11/8/2022 at    18:01. Final   Findings suggestive of right MCA distribution stroke. Consider CT angiogram   or MRI as clinically warranted. Negative for acute bleed midline shift mass effect. VL Extremity Venous Bilateral   Final Result      CTA HEAD NECK W CONTRAST   Final Result   Severely motion degraded exam without large vessel occlusion in the head or   neck. CT HEAD WO CONTRAST   Final Result   Addendum (preliminary) 1 of 1   ADDENDUM:   Results were called to Dr. Roberto Blackmon at 6:23 p.m. Anna Dempsey Final   No evidence of acute intracranial process.       Remote bilateral cerebellar hemisphere infarcts noted along with a small   remote lacunar infarct of the left caudate head. XR CHEST PORTABLE   Final Result   The endotracheal tube ends appropriately above the marita and below the   clavicular heads. The nasogastric tube ends within the stomach. XR CHEST PORTABLE   Final Result   Mild cardiomegaly and mild pulmonary edema. 1.5 cm slightly dense nodule right upper lobe which is unchanged             CONSULTS:    IP CONSULT TO HEART FAILURE NURSE/COORDINATOR  IP CONSULT TO DIETITIAN  IP CONSULT TO CARDIOLOGY  IP CONSULT TO CRITICAL CARE  IP CONSULT TO NEPHROLOGY  IP CONSULT TO NEUROLOGY  IP CONSULT TO PALLIATIVE CARE  IP CONSULT TO PHYSICAL MEDICINE REHAB  IP CONSULT TO PHYSICAL MEDICINE REHAB    ASSESSMENT AND PLAN:      Active Problems:    Congestive heart failure (HCC)    NSTEMI (non-ST elevated myocardial infarction) ([facility] [address]    PEYTON (acute kidney injury) ([facility] [address]    Elevated troponin    COPD exacerbation (HCC)    Acute hypoxemic respiratory failure (HCC)    Acute pulmonary edema (HCC)  Resolved Problems:    * No resolved hospital problems. *    Patient is a 61-year-old female with a past medical history of COPD, hypertension, hyper lipidemia who was admitted with CHF exacerbation with possible NSTEMI and COPD exacerbation. Hospital course was complicated by bradycardia followed by [facility] and patient was transferred to the ICU. With elevated D-dimer demand ischemia shortness of breath there is concern for possible PE. Patient is started on heparin drip. Acute on chronic hypoxic respiratory failure-vent management per pulm, possible extubation  -extubated. Remains with tenuous pulm status  -keep in icu  -continue diuresis  -Patient uses home oxygen.   Unclear how much oxygen is used at home    Cardiac arrest-cause unclear  -Bradycardia followed by PEA cardiac arrest  -With elevated D-dimer unclear if patient had a PE(presentation unlike cardiac arrest for PE)  -Already on a heparin drip for NSTEMI prior to code  -Venous duplex ordered negative for any DVT  -Unable to get a CTA chest due to PEYTON. -Echo shows some RV dilatation  -gentle diuresis    Possible diastolic CHF exacerbation-on admission. Hold diuretic today  -Echo shows a EF of 60%. RV dilated  -Chest x-ray on admission showed mild pulmonary edema  -restart lasix    Acute right frontal and left occipital stroke  Continue antiplatelet therapy aspirin  Patient has significant left-sided weakness along with dysphagia  PT OT working with the patient  Unable to do CTA right now due to renal failure  Neuro recommending MRA as alternative we will pursue in the next 24-hours    Possible NSTEMI  Medical treatment for now off of heparin due to acute stroke      Acute kidney injury-urine output improved, creatinine still trending  -Monitor HOWARD  -Nephrology consulted    Possible COPD exacerbation  -Continue steroids with inhalers/nebulizers  -Continue doxycycline    Hypertension  -Hold blood pressure medications                DVT Prophylaxis: Heparin  Diet: ADULT DIET; Dysphagia - Pureed; Low Sodium (2 gm); Moderately Thick (Honey); 2000 ml  Code Status: Limited    PT/OT Eval Status:  ordered  Speech therapy ordered.   Discharge plan -continue care      Amaris Arce MD

## 2022-11-10 NOTE — PROGRESS NOTES
4 Eyes Skin Assessment     NAME:  Austin Healy  YOB: 1950  MEDICAL RECORD NUMBER:  [id number]    The patient is being assess for  Shift Handoff    I agree that 2 RN's have performed a thorough Head to Toe Skin Assessment on the patient. ALL assessment sites listed below have been assessed. Areas assessed by both nurses:    Head, Face, Ears, Shoulders, Back, Chest, Arms, Elbows, Hands, Sacrum. Buttock, Coccyx, Ischium, and Legs. Feet and Heels        Does the Patient have a Wound?  No noted wound(s)       Ajit Prevention initiated:  yes   Wound Care Orders initiated:  No    Pressure Injury (Stage 3,4, Unstageable, DTI, NWPT, and Complex wounds) if present place referral/consult order under [de-identified] No    New and Established Ostomies if present place consult order under : No      Nurse 1 eSignature: Electronically signed by Fran Duron RN on 11/10/22 at 7:27 AM EST    **SHARE this note so that the co-signing nurse is able to place an eSignature**    Nurse 2 eSignature: {Esignature:[id number]}

## 2022-11-10 NOTE — CONSULTS
Department of [facility]  Dr. Seferino Ferrer Progress Note  11/10/2022  1:51 PM    Patient Name:   Rogelio Awad  YOB: 1950    Diagnosis: Acute cerebrovascular infarction. Subjective: Rehab consult received. Chart reviewed. Patient discussed with our rehab unit admission nurse. Pt is a 71 y.o. F. admitted 11/6 for SOB, cardiopulmonary arrest, subsequently intubated. She was found to have L facial droop. CVA confirmed. She presents with dysphagia, and is fixated on eating/drinking (NPO) having difficulty following commands initially for mobility tasks. MRI scan shows acute infarct in the right frontal lobe as well as micro infarcts within the left occipital lobe. Patient seen by neurology, and MRA of head recommended when feasible. Patient also with shortness of breath and suspected to have aspiration pneumonia with acute exacerbation of her COPD. Patient have a modified barium swallow today per speech. Patient being seen by pulmonology. Patient still in ICU. Patient started therapies, needs mod assist for bed mobility contact-guard assist to max assist for transfers. Therapies recommend rehab unit treatment. Patient does live at home alone in apartment with an elevator. Patient has [employer].     BP (!) 162/102   Pulse (!) 103   Temp 98.5 °F (36.9 °C) (Oral)   Resp 28   Ht 5' 3\" (1.6 m)   Wt 171 lb 1.2 oz (77.6 kg)   SpO2 96%   BMI 30.30 kg/m²     Last 24 hour lab  Recent Results (from the past 24 hour(s))   POCT Glucose    Collection Time: 11/09/22  4:10 PM   Result Value Ref Range    POC Glucose 168 (H) 70 - 99 mg/dl    Performed on ACCU-CHEK    Renal Function Panel    Collection Time: 11/10/22  5:10 AM   Result Value Ref Range    Sodium 152 (H) 136 - 145 mmol/L    Potassium 3.9 3.5 - 5.1 mmol/L    Chloride 107 99 - 110 mmol/L    CO2 28 21 - 32 mmol/L    Anion Gap 17 (H) 3 - 16    Glucose 123 (H) 70 - 99 mg/dL    BUN 73 (H) 7 - 20 mg/dL    Creatinine 1.8 (H) 0.6 - 1.2 mg/dL    Est, Glom Filt Rate 30 (A) >60    Calcium 10.0 8.3 - 10.6 mg/dL    Phosphorus 3.7 2.5 - 4.9 mg/dL    Albumin 4.3 3.4 - 5.0 g/dL   Magnesium    Collection Time: 11/10/22  5:10 AM   Result Value Ref Range    Magnesium 2.40 1.80 - 2.40 mg/dL   CBC with Auto Differential    Collection Time: 11/10/22  5:10 AM   Result Value Ref Range    WBC 7.4 4.0 - 11.0 K/uL    RBC 3.81 (L) 4.00 - 5.20 M/uL    Hemoglobin 11.9 (L) 12.0 - 16.0 g/dL    Hematocrit 35.6 (L) 36.0 - 48.0 %    MCV 93.5 80.0 - 100.0 fL    MCH 31.2 26.0 - 34.0 pg    MCHC 33.4 31.0 - 36.0 g/dL    RDW 13.5 12.4 - 15.4 %    Platelets 249 895 - 114 K/uL    MPV 7.8 5.0 - 10.5 fL    Neutrophils % 87.4 %    Lymphocytes % 3.3 %    Monocytes % 9.2 %    Eosinophils % 0.0 %    Basophils % 0.1 %    Neutrophils Absolute 6.5 1.7 - 7.7 K/uL    Lymphocytes Absolute 0.2 (L) 1.0 - 5.1 K/uL    Monocytes Absolute 0.7 0.0 - 1.3 K/uL    Eosinophils Absolute 0.0 0.0 - 0.6 K/uL    Basophils Absolute 0.0 0.0 - 0.2 K/uL         Plan: We will follow, and see how she progresses.       Dr. Rebecca Castillo

## 2022-11-10 NOTE — CARE COORDINATION
Discharge Planning:    Attempted to speak with patient at bedside, but she was receiving a breathing treatment and working with RT at the time. Will re-attempt later as time allows.     Electronically signed by Raffi Carmichael RN on 11/10/2022 at 11:52 AM

## 2022-11-10 NOTE — CONSULTS
HF RN consult noted, per order set. I have been following peripherally since day of admit. Appropriate HF orders are in place ie low Na diet, 2 liter FR, daily wts, I&Os. ARU consult noted and following. HF RN will continue to follow and see once appropriate (out of ICU or in ARU).  Thank you

## 2022-11-10 NOTE — PROGRESS NOTES
4 Eyes Skin Assessment     NAME:  Daija Warner  YOB: 1950  MEDICAL RECORD NUMBER:  [id number]    The patient is being assess for  Shift Handoff    I agree that 2 RN's have performed a thorough Head to Toe Skin Assessment on the patient. ALL assessment sites listed below have been assessed. Areas assessed by both nurses:    Head, Face, Ears, Shoulders, Back, Chest, Arms, Elbows, Hands, Sacrum. Buttock, Coccyx, Ischium, and Legs. Feet and Heels        Does the Patient have a Wound?  No noted wound(s)       Ajit Prevention initiated:  Yes   Wound Care Orders initiated:  No    Pressure Injury (Stage 3,4, Unstageable, DTI, NWPT, and Complex wounds) if present place referral/consult order under [de-identified] No    New and Established Ostomies if present place consult order under : No      Nurse 1 eSignature: Electronically signed by Cody Barrios RN on 11/10/22 at 7:21 AM EST    **SHARE this note so that the co-signing nurse is able to place an eSignature**    Nurse 2 eSignature: Electronically signed by Roxanne Lehman RN on 11/10/22 at 7:27 AM EST

## 2022-11-10 NOTE — PROGRESS NOTES
Comprehensive Nutrition Assessment    Type and Reason for Visit:  Reassess    Nutrition Recommendations/Plan:   Continue Dysphagia Pureed with moderately thick liquids. Monitor need for ONS. Malnutrition Assessment:  Malnutrition Status:  No malnutrition (11/07/22 1104)      Nutrition Assessment:    Follow-up. Pt extubated 11/8. Pt had MBS completed this AM and diet advanced to Pureed, Moderately thick liquids from NPO. Diet education remains inappropriate at this time. Will place in diet instructions in case pt D/C'd prior to education. Will monitor need for ONS. Nutrition Related Findings:    BM 11/8; Na 152 Wound Type: None       Current Nutrition Intake & Therapies:    Average Meal Intake: Unable to assess  Average Supplements Intake: None Ordered  ADULT DIET; Dysphagia - Pureed; Moderately Thick (Honey)    Anthropometric Measures:  Height: 5' 3\" (160 cm)  Ideal Body Weight (IBW): 115 lbs (52 kg)    Admission Body Weight: 187 lb (84.8 kg)  Current Body Weight: 171 lb (77.6 kg), 148.7 % IBW. Weight Source: Bed Scale  Current BMI (kg/m2): 30.3                          BMI Categories: Obese Class 1 (BMI 30.0-34. 9)    Estimated Daily Nutrient Needs:  Energy Requirements Based On: Kcal/kg  Weight Used for Energy Requirements: Current  Energy (kcal/day): 0962-4607 kcal (15-18 kcal/kg 77 kg CBW)  Weight Used for Protein Requirements: Ideal  Protein (g/day): 63-73 gm (1.2-1.4 gm/kg IBW)  Method Used for Fluid Requirements: Other (Comment)  Fluid (ml/day): per MD d/t ICU status    Nutrition Diagnosis:   Inadequate oral intake related to inadequate protein-energy intake as evidenced by  (diet just advanced today)    Nutrition Interventions:   Food and/or Nutrient Delivery: Continue Current Diet  Nutrition Education/Counseling: No recommendation at this time  Coordination of Nutrition Care: Continue to monitor while inpatient       Goals:     Goals: PO intake 50% or greater       Nutrition Monitoring and Evaluation:   Behavioral-Environmental Outcomes: None Identified  Food/Nutrient Intake Outcomes: Food and Nutrient Intake  Physical Signs/Symptoms Outcomes: Biochemical Data, Weight, Skin, Nutrition Focused Physical Findings    Discharge Planning:     Too soon to determine     Kirsten Nichols RD, LD  Contact: [phone]

## 2022-11-11 VITALS
OXYGEN SATURATION: 96 % | RESPIRATION RATE: 24 BRPM | HEART RATE: 96 BPM | HEIGHT: 63 IN | BODY MASS INDEX: 30.55 KG/M2 | DIASTOLIC BLOOD PRESSURE: 66 MMHG | SYSTOLIC BLOOD PRESSURE: 124 MMHG | WEIGHT: 172.4 LBS | TEMPERATURE: 98.1 F

## 2022-11-11 LAB
ALBUMIN SERPL-MCNC: 4.5 G/DL (ref 3.4–5)
ANION GAP SERPL CALCULATED.3IONS-SCNC: 16 MMOL/L (ref 3–16)
BASOPHILS ABSOLUTE: 0 K/UL (ref 0–0.2)
BASOPHILS RELATIVE PERCENT: 0.1 %
BLOOD CULTURE, ROUTINE: NORMAL
BUN BLDV-MCNC: 78 MG/DL (ref 7–20)
CALCIUM SERPL-MCNC: 10.2 MG/DL (ref 8.3–10.6)
CHLORIDE BLD-SCNC: 110 MMOL/L (ref 99–110)
CO2: 29 MMOL/L (ref 21–32)
CREAT SERPL-MCNC: 1.6 MG/DL (ref 0.6–1.2)
CULTURE, BLOOD 2: NORMAL
EOSINOPHILS ABSOLUTE: 0 K/UL (ref 0–0.6)
EOSINOPHILS RELATIVE PERCENT: 0 %
GFR SERPL CREATININE-BSD FRML MDRD: 34 ML/MIN/{1.73_M2}
GLUCOSE BLD-MCNC: 129 MG/DL (ref 70–99)
HCT VFR BLD CALC: 37 % (ref 36–48)
HEMOGLOBIN: 12.3 G/DL (ref 12–16)
LYMPHOCYTES ABSOLUTE: 0.3 K/UL (ref 1–5.1)
LYMPHOCYTES RELATIVE PERCENT: 2.7 %
MAGNESIUM: 2.5 MG/DL (ref 1.8–2.4)
MCH RBC QN AUTO: 31.4 PG (ref 26–34)
MCHC RBC AUTO-ENTMCNC: 33.2 G/DL (ref 31–36)
MCV RBC AUTO: 94.6 FL (ref 80–100)
MONOCYTES ABSOLUTE: 1 K/UL (ref 0–1.3)
MONOCYTES RELATIVE PERCENT: 9.4 %
NEUTROPHILS ABSOLUTE: 9.1 K/UL (ref 1.7–7.7)
NEUTROPHILS RELATIVE PERCENT: 87.8 %
PDW BLD-RTO: 14.1 % (ref 12.4–15.4)
PHOSPHORUS: 4.1 MG/DL (ref 2.5–4.9)
PLATELET # BLD: 232 K/UL (ref 135–450)
PMV BLD AUTO: 7.9 FL (ref 5–10.5)
POTASSIUM SERPL-SCNC: 3.9 MMOL/L (ref 3.5–5.1)
RBC # BLD: 3.91 M/UL (ref 4–5.2)
SODIUM BLD-SCNC: 155 MMOL/L (ref 136–145)
WBC # BLD: 10.4 K/UL (ref 4–11)

## 2022-11-11 PROCEDURE — 94761 N-INVAS EAR/PLS OXIMETRY MLT: CPT

## 2022-11-11 PROCEDURE — 6370000000 HC RX 637 (ALT 250 FOR IP): Performed by: STUDENT IN AN ORGANIZED HEALTH CARE EDUCATION/TRAINING PROGRAM

## 2022-11-11 PROCEDURE — 2580000003 HC RX 258: Performed by: STUDENT IN AN ORGANIZED HEALTH CARE EDUCATION/TRAINING PROGRAM

## 2022-11-11 PROCEDURE — 85025 COMPLETE CBC W/AUTO DIFF WBC: CPT

## 2022-11-11 PROCEDURE — 2580000003 HC RX 258: Performed by: INTERNAL MEDICINE

## 2022-11-11 PROCEDURE — 6360000002 HC RX W HCPCS: Performed by: INTERNAL MEDICINE

## 2022-11-11 PROCEDURE — C1751 CATH, INF, PER/CENT/MIDLINE: HCPCS

## 2022-11-11 PROCEDURE — 94668 MNPJ CHEST WALL SBSQ: CPT

## 2022-11-11 PROCEDURE — 6370000000 HC RX 637 (ALT 250 FOR IP): Performed by: INTERNAL MEDICINE

## 2022-11-11 PROCEDURE — 2580000003 HC RX 258: Performed by: NURSE PRACTITIONER

## 2022-11-11 PROCEDURE — 6360000002 HC RX W HCPCS: Performed by: NURSE PRACTITIONER

## 2022-11-11 PROCEDURE — 2500000003 HC RX 250 WO HCPCS: Performed by: NURSE PRACTITIONER

## 2022-11-11 PROCEDURE — 02HV33Z INSERTION OF INFUSION DEVICE INTO SUPERIOR VENA CAVA, PERCUTANEOUS APPROACH: ICD-10-PCS | Performed by: INTERNAL MEDICINE

## 2022-11-11 PROCEDURE — 99233 SBSQ HOSP IP/OBS HIGH 50: CPT | Performed by: INTERNAL MEDICINE

## 2022-11-11 PROCEDURE — 99231 SBSQ HOSP IP/OBS SF/LOW 25: CPT | Performed by: NURSE PRACTITIONER

## 2022-11-11 PROCEDURE — 2700000000 HC OXYGEN THERAPY PER DAY

## 2022-11-11 PROCEDURE — 80069 RENAL FUNCTION PANEL: CPT

## 2022-11-11 PROCEDURE — 83735 ASSAY OF MAGNESIUM: CPT

## 2022-11-11 PROCEDURE — 36569 INSJ PICC 5 YR+ W/O IMAGING: CPT

## 2022-11-11 PROCEDURE — 94640 AIRWAY INHALATION TREATMENT: CPT

## 2022-11-11 RX ORDER — MORPHINE SULFATE 2 MG/ML
1 INJECTION, SOLUTION INTRAMUSCULAR; INTRAVENOUS ONCE
Status: COMPLETED | OUTPATIENT
Start: 2022-11-11 | End: 2022-11-11

## 2022-11-11 RX ORDER — METHYLPREDNISOLONE SODIUM SUCCINATE 40 MG/ML
40 INJECTION, POWDER, LYOPHILIZED, FOR SOLUTION INTRAMUSCULAR; INTRAVENOUS EVERY 12 HOURS
Status: DISCONTINUED | OUTPATIENT
Start: 2022-11-11 | End: 2022-11-11 | Stop reason: HOSPADM

## 2022-11-11 RX ADMIN — METHYLPREDNISOLONE SODIUM SUCCINATE 40 MG: 40 INJECTION, POWDER, FOR SOLUTION INTRAMUSCULAR; INTRAVENOUS at 08:05

## 2022-11-11 RX ADMIN — IPRATROPIUM BROMIDE AND ALBUTEROL SULFATE 1 AMPULE: .5; 2.5 SOLUTION RESPIRATORY (INHALATION) at 08:41

## 2022-11-11 RX ADMIN — SODIUM CHLORIDE SOLN NEBU 3% 15 ML: 3 NEBU SOLN at 13:00

## 2022-11-11 RX ADMIN — Medication 10 ML: at 08:06

## 2022-11-11 RX ADMIN — LORAZEPAM 0.5 MG: 2 INJECTION INTRAMUSCULAR; INTRAVENOUS at 08:05

## 2022-11-11 RX ADMIN — MORPHINE SULFATE 1 MG: 2 INJECTION, SOLUTION INTRAMUSCULAR; INTRAVENOUS at 17:39

## 2022-11-11 RX ADMIN — ASPIRIN 81 MG: 81 TABLET, COATED ORAL at 08:17

## 2022-11-11 RX ADMIN — SODIUM CHLORIDE SOLN NEBU 3% 15 ML: 3 NEBU SOLN at 08:41

## 2022-11-11 RX ADMIN — ROFLUMILAST 500 MCG: 500 TABLET ORAL at 08:21

## 2022-11-11 RX ADMIN — MICONAZOLE NITRATE: 2 POWDER TOPICAL at 08:06

## 2022-11-11 RX ADMIN — LORAZEPAM 0.5 MG: 2 INJECTION INTRAMUSCULAR; INTRAVENOUS at 14:16

## 2022-11-11 RX ADMIN — FENTANYL CITRATE 25 MCG: 0.05 INJECTION, SOLUTION INTRAMUSCULAR; INTRAVENOUS at 12:45

## 2022-11-11 RX ADMIN — SODIUM CHLORIDE, PRESERVATIVE FREE 10 ML: 5 INJECTION INTRAVENOUS at 08:07

## 2022-11-11 RX ADMIN — BUDESONIDE 500 MCG: 0.5 SUSPENSION RESPIRATORY (INHALATION) at 08:41

## 2022-11-11 RX ADMIN — Medication 20 MG: at 08:05

## 2022-11-11 RX ADMIN — FENTANYL CITRATE 25 MCG: 0.05 INJECTION, SOLUTION INTRAMUSCULAR; INTRAVENOUS at 14:58

## 2022-11-11 RX ADMIN — AMLODIPINE BESYLATE 5 MG: 5 TABLET ORAL at 08:17

## 2022-11-11 RX ADMIN — IPRATROPIUM BROMIDE AND ALBUTEROL SULFATE 1 AMPULE: .5; 2.5 SOLUTION RESPIRATORY (INHALATION) at 11:44

## 2022-11-11 ASSESSMENT — PAIN SCALES - GENERAL
PAINLEVEL_OUTOF10: 0

## 2022-11-11 NOTE — PROGRESS NOTES
Pt found in bed with gown removed and PICC on the floor. RN changed gown and situated pt in bed. Pt continued to be restless and withdrawn.

## 2022-11-11 NOTE — PROGRESS NOTES
[facility]                                               Progress note    CC: SOB, cardiac arrest.  Summary:   Viridiana Vee is being seen by nephrology for Acute kidney injury (PEYTON). She is a 71 y.o. female with a PMH significant for COPD (continues to smoke), CAD, CVA, hypertension who presented to the ED 11/6/2022 with complaints progressively worsening shortness of breath. She is not known to have CHF prior to this admission. On 11/6/2022 she went into cardiac arrest with asystole needing CPR for 3 minutes. Since then her Cr has increased from 1.2 at admission to 2.5 at time of consult. Interval History  - seen at bedside  - /81  - Cr improved to 1.6 today  - hypernatremia worse, Na today 155  - made 930 mL urine yesterday with lasix IV 80 mg once. .  - failed her swallow evaluation yesterday. Allowed pureed food and thickened liquids. - mucus membranes really dry  - on 4 LPM O2 via NC    Plan:   - Cr continues to improve. - CXR from yesterday reviewed. - would recommend holding lasix for today  - encourage free water intake as much as possible for hypernateremia, avoiding IV fluids due to high risk of fluid overload. Capri Kim MD  [facility]  Office: [phone]    Assessment:   PEYTON:  - baseline Cr 0.8-1.0  - Cr on admission was at baseline but she unfortunately had PEA cardiac arrest.  - Cr increased from 1.2 to 2.5 within 24 hours  - oligoanuric now  - PEYTON likely 2/2 ATN from hypoperfusion. CHF:  - admitted with SOB, elevated BNP  - pulmonary edema on chest imaging.  - was getting diuresis after admission  - echo 11/7/2022 with LVEF 60%, indeterminate diastolic function, RV dilated. ROS:   Positives Listed Bold. All other remaining systems are negative. Constitutional:  fever, chills, weakness, weight change, fatigue,      Skin:  rash, pruritus, hair loss, bruising, dry skin, petechiae. Head, Face, Neck   headaches, swelling,  cervical adenopathy. Respiratory: shortness of breath, cough, or wheezing  Cardiovascular: chest pain, palpitations, dizzy, edema  Gastrointestinal: nausea, vomiting, diarrhea, constipation,belly pain    Yellow skin, blood in stool  Musculoskeletal:  back pain, muscle weakness, gait problems,       joint pain or swelling. Genitourinary:  dysuria, poor urine flow, flank pain, blood in urine  Neurologic:  vertigo, TIA'S, syncope, seizures, focal weakness  Psychosocial:  insomnia, anxiety, or depression. Additional positive findings: -      PMH:   Past medical history, surgical history, social history, family history are reviewed and updated as appropriate. Reviewed current medication list.   Allergies reviewed and updated as needed. PE:   Vitals:    11/11/22 0800   BP: (!) 159/81   Pulse: (!) 101   Resp: 29   Temp:    SpO2:        General appearance: in no acute distress, comfortable, communicative, awake   HEENT: no icterus, EOM intact, trachea midline. Neck : no masses, appears symmetrical and no JVD appreciated. Respiratory: Respiratory effort normal, bilateral equal chest rise. + wheeze, no crackles  Cardiovascular: Ausculation shows RRR and  no edema   Abdomen: abdomen is soft, non distended, no masses, no pain with palpation. Musculoskeletal:  no joint swelling, no deformity, strength grossly normal.   Skin: no rashes, no induration, no tightening, no jaundice   Neuro:   Follows commands, moves all extremities spontaneously       Lab Results   Component Value Date    CREATININE 1.6 (H) 11/11/2022    BUN 78 (H) 11/11/2022     (H) 11/11/2022    K 3.9 11/11/2022     11/11/2022    CO2 29 11/11/2022      Lab Results   Component Value Date    WBC 10.4 11/11/2022    HGB 12.3 11/11/2022    HCT 37.0 11/11/2022    MCV 94.6 11/11/2022     11/11/2022     Lab Results   Component Value Date    CALCIUM 10.2 11/11/2022    CAION 1.15 10/14/2015    PHOS 4.1 11/11/2022

## 2022-11-11 NOTE — PROGRESS NOTES
11/11/22 1354   Encounter Summary   Encounter Overview/Reason  Initial Encounter;Spiritual/Emotional Needs; Rituals, Rites and Sacraments   Service Provided For: Patient;Patient and family together   Referral/Consult From: Other    Support System Children   Last Encounter  19/58/98  (sos, [facility] nikki [facility] 66/07)   Complexity of Encounter Moderate   Begin Time 1335   End Time  1356   Total Time Calculated 21 min   Encounter    Type Initial Screen/Assessment   Spiritual/Emotional needs   Type Spiritual Support   Rituals, Rites and Sacraments   Type Sacrament of Sick; Anointing   Grief, Loss, and Adjustments   Type Life Adjustments   Assessment/Intervention/Outcome   Assessment Compromised coping;Concerns with suffering; Impaired resilience; Powerlessness   Intervention End of Life Care;Guided Imagery, Healing touch, etc.;Prayer (assurance of)/Trent   Outcome Comfort; Acceptance;Expressed Gratitude   Electronically signed by Anusha Crabtree on 11/11/2022 at 2:00 PM

## 2022-11-11 NOTE — PROGRESS NOTES
Cardiology - PROGRESS NOTE    Admit Date: 11/6/2022     Reason for follow up: shortness of breath, elevated troponin     71 y.o. female who comes to the hospital with several days history of shortness of breath cough inability to lay flat. She has COPD and is on oxygen at home. Admission proBNP is 8318. EKG is normal creatinine is normal and hemoglobin is normal         Social History:   reports that she has been smoking cigarettes. She has a 12.50 pack-year smoking history. She has never used smokeless tobacco. She reports that she does not currently use alcohol. She reports that she does not use drugs. Family History: family history includes Cancer in her mother; Heart Attack in her father. Living Situation:      Interval History:   Patient seen and examined and notes reviewed   Palliative care met with pt and family   Decision for hospice care     All other systems reviewed and negative except as above. Diet: ADULT DIET; Dysphagia - Pureed; Low Sodium (2 gm);  Moderately Thick (Honey); 2000 ml      In: 271   Out: 1070    Patient Vitals for the past 96 hrs (Last 3 readings):   Weight   11/11/22 0552 172 lb 6.4 oz (78.2 kg)   11/10/22 0539 171 lb 1.2 oz (77.6 kg)   11/09/22 0600 172 lb 2.9 oz (78.1 kg)           Data:   Scheduled Meds:   Scheduled Meds:   methylPREDNISolone  40 mg IntraVENous Q12H    amLODIPine  5 mg Oral Daily    lidocaine 1 % injection  5 mL IntraDERmal Once    sodium chloride flush  5-40 mL IntraVENous 2 times per day    budesonide  0.5 mg Nebulization BID    sodium chloride (Inhalant)  15 mL Nebulization TID    [Held by provider] carvedilol  3.125 mg Oral BID WC    ipratropium-albuterol  1 ampule Inhalation 6 times per day    nicotine  1 patch TransDERmal Daily    aspirin  81 mg Oral Daily    Or    aspirin  300 mg Rectal Daily    atorvastatin  40 mg Oral Nightly    lidocaine  1 patch TransDERmal Daily    famotidine (PEPCID) injection  20 mg IntraVENous Daily    Roflumilast  500 mcg Oral Daily    sodium chloride flush  5-40 mL IntraVENous 2 times per day    miconazole   Topical BID     Continuous Infusions:   sodium chloride      sodium chloride       PRN Meds:. LORazepam, sodium chloride flush, sodium chloride, fentanNYL, traMADol, ondansetron **OR** ondansetron, polyethylene glycol, perflutren lipid microspheres, sodium chloride flush, sodium chloride, acetaminophen **OR** acetaminophen, perflutren lipid microspheres, guaiFENesin-dextromethorphan, ipratropium-albuterol, clonazePAM  Continuous Infusions:   sodium chloride      sodium chloride         Intake/Output Summary (Last 24 hours) at 11/11/2022 0952  Last data filed at 11/11/2022 0900  Gross per 24 hour   Intake 270.97 ml   Output 955 ml   Net -684.03 ml       Lab Results   Component Value Date     (H) 11/06/2022     (H) 11/06/2022    ALKPHOS 181 (H) 11/06/2022    BILITOT 0.5 11/06/2022     Lab Results   Component Value Date    CREATININE 1.6 (H) 11/11/2022    BUN 78 (H) 11/11/2022     (H) 11/11/2022    K 3.9 11/11/2022     11/11/2022    CO2 29 11/11/2022     No results found for: TSH, O7QUJGF, X0DRMCV, THYROIDAB  Lab Results   Component Value Date    WBC 10.4 11/11/2022    HGB 12.3 11/11/2022    HCT 37.0 11/11/2022    MCV 94.6 11/11/2022     11/11/2022     No components found for: CHLPL  Lab Results   Component Value Date    TRIG 433 (H) 11/09/2022    TRIG 230 (H) 10/14/2015    TRIG 240 (H) 10/12/2015     Lab Results   Component Value Date    HDL 35 (L) 11/09/2022     Lab Results   Component Value Date    LDLCALC see below 11/09/2022     Lab Results   Component Value Date    LABVLDL see below 11/09/2022         -----------------------------------------------------------------  Telemetry: personally reviewed     Radiology:     CXR:  11/9/22  FINDINGS:   The endotracheal tube and nasogastric tube have been removed.   Right PICC   line is over the expected SVC with the patient is very rotated to the right. There are residual opacities in the mid to lower right lung which appear more   than the left side. The could be some decreasing volume and increasing   atelectasis in the right lower chest.  Pulmonary vascular congestion remains. A calcified granuloma in the right upper lobe is still present. No   pneumothorax is identified. Impression   Endotracheal tube and nasogastric tube have been removed. Right PICC line is   still present. Patient is rotated to the right distorting the mediastinum and grecia. There   does appear to be increasing opacities in the right lower chest and possible   volume loss. May represent a combination of edema and atelectasis. EK22  SR with PVC    Echo:    Summary   Normal LV size and wall motion. EF is    60%. indeterminate diastolic   function. Trivial mitral& tricuspid regurgitation is present. Left atrium is of normal size. Very mild aortic stenosis. Mean gradient 15 mmHg. RV appears dilated . Objective:   Vitals: BP (!) 155/84   Pulse (!) 114   Temp 98.5 °F (36.9 °C) (Axillary)   Resp (!) 37   Ht 5' 3\" (1.6 m)   Wt 172 lb 6.4 oz (78.2 kg)   SpO2 95%   BMI 30.54 kg/m²   General appearance: awake,  restless   Skin: Skin color, texture, turgor normal. No rashes or ecchymosis. HEENT: Head: Normal, normocephalic, atraumatic.   Neck: no carotid bruit, no JVD, supple, symmetrical, trachea midline, and thyroid not enlarged, symmetric, no tenderness/mass/nodules  Lungs: clear to auscultation bilaterally, no accessory muscle use,   Heart: regular rate and rhythm, S1, S2 normal, no murmur, click, rub or gallop  Abdomen: soft, non-tender; bowel sounds normal; no masses,  no organomegaly  Extremities: extremities normal, atraumatic, no cyanosis or edema, pulses: DP +2/+2, PT +2/+2, femoral +2/+2  Neurologic: Mental status: awake, unable to orient  Psychiatric: limited       Assessment & Plan:    Patient Active Problem List:          Plan:  1. Respiratory failure   Acute on chronic hypoxic and hypercapnia    Extubated    Remains on O2    Per pulm/CC  2. Heart failure    Acute on chronic diastolic    Preserved EF    Appears near compensated on exam   No ACE/ARB/aldactone/SGLT2i secondary to PEYTON   Unable to place on OMT until she can safely tolerate orals   Fluid and Na restrictions   3. Elevated troponin    Stable   No wall motion abnormalities   Continue risk factor modification   Start statin, beta-blocker once able to swallow    ASA per neurology       4. PEYTON    Improved today    Cr 1.6    Unable to tolerate orals    PRN IVP lasix   5. CVA   Per neurology     Dispo-  Pt to hospice today  Cardiology will sign off.    Please call with questions           Kacie Calzada, APRN-CNP   [address]  Cardiology   11/11/2022  9:52 AM

## 2022-11-11 NOTE — PLAN OF CARE
Problem: Chronic Conditions and Co-morbidities  Goal: Patient's chronic conditions and co-morbidity symptoms are monitored and maintained or improved  11/11/2022 0745 by Jasmin Marks RN  Outcome: Not Progressing  11/11/2022 0140 by Ana Pena RN  Outcome: Progressing  Flowsheets (Taken 11/10/2022 2043)  Care Plan - Patient's Chronic Conditions and Co-Morbidity Symptoms are Monitored and Maintained or Improved: Monitor and assess patient's chronic conditions and comorbid symptoms for stability, deterioration, or improvement     Problem: Respiratory - Adult  Goal: Achieves optimal ventilation and oxygenation  11/11/2022 0745 by Jasmin Marks RN  Outcome: Not Progressing  11/11/2022 0140 by Ana Pena RN  Outcome: Progressing  Flowsheets (Taken 11/10/2022 2043)  Achieves optimal ventilation and oxygenation:   Assess for changes in respiratory status   Assess for changes in mentation and behavior     Problem: Chronic Conditions and Co-morbidities  Goal: Patient's chronic conditions and co-morbidity symptoms are monitored and maintained or improved  11/11/2022 0745 by Jasmin Marks RN  Outcome: Not Progressing  11/11/2022 0140 by Ana Pena RN  Outcome: Progressing  Flowsheets (Taken 11/10/2022 2043)  Care Plan - Patient's Chronic Conditions and Co-Morbidity Symptoms are Monitored and Maintained or Improved: Monitor and assess patient's chronic conditions and comorbid symptoms for stability, deterioration, or improvement     Problem: Respiratory - Adult  Goal: Achieves optimal ventilation and oxygenation  11/11/2022 0745 by Jasmin Marks RN  Outcome: Not Progressing  11/11/2022 0140 by Ana Pena RN  Outcome: Progressing  Flowsheets (Taken 11/10/2022 2043)  Achieves optimal ventilation and oxygenation:   Assess for changes in respiratory status   Assess for changes in mentation and behavior     Problem: Neurosensory - Adult  Goal: Achieves stable or improved neurological status  11/11/2022 0745 by Radhika Dominguez RN  Outcome: Progressing  11/11/2022 0140 by Rhae Meigs, RN  Outcome: Not Progressing  Flowsheets (Taken 11/10/2022 2043)  Achieves stable or improved neurological status: Assess for and report changes in neurological status  Goal: Achieves maximal functionality and self care  11/11/2022 0745 by Radhika Dominguez RN  Outcome: Progressing  11/11/2022 0140 by Rhae Meigs, RN  Outcome: Not Progressing  Flowsheets (Taken 11/10/2022 2043)  Achieves maximal functionality and self care: Monitor swallowing and airway patency with patient fatigue and changes in neurological status     Problem: Nutrition Deficit:  Goal: Optimize nutritional status  11/11/2022 0745 by Radhika Dominguez RN  Outcome: Progressing  11/11/2022 0140 by Rhae Meigs, RN  Outcome: Not Progressing

## 2022-11-11 NOTE — PLAN OF CARE
Problem: Neurosensory - Adult  Goal: Achieves stable or improved neurological status  Outcome: Not Progressing  Flowsheets (Taken 11/10/2022 2043)  Achieves stable or improved neurological status: Assess for and report changes in neurological status  Goal: Achieves maximal functionality and self care  Outcome: Not Progressing  Flowsheets (Taken 11/10/2022 2043)  Achieves maximal functionality and self care: Monitor swallowing and airway patency with patient fatigue and changes in neurological status     Problem: Nutrition Deficit:  Goal: Optimize nutritional status  Outcome: Not Progressing     Problem: Neurosensory - Adult  Goal: Achieves stable or improved neurological status  Outcome: Not Progressing  Flowsheets (Taken 11/10/2022 2043)  Achieves stable or improved neurological status: Assess for and report changes in neurological status  Goal: Achieves maximal functionality and self care  Outcome: Not Progressing  Flowsheets (Taken 11/10/2022 2043)  Achieves maximal functionality and self care: Monitor swallowing and airway patency with patient fatigue and changes in neurological status     Problem: Nutrition Deficit:  Goal: Optimize nutritional status  Outcome: Not Progressing

## 2022-11-11 NOTE — PROGRESS NOTES
Patient picked up by ambulance to escort her to [facility]. 1mg morphine given prior to discharge. PICC, Joy catheter and 4L Nasal cannula left in place for comfort. Daughter given a ride to hospice facility by third party (not ambulance). Paperwork sent with Ambulance crew.

## 2022-11-11 NOTE — PROGRESS NOTES
Arrived to bedside for PICC placement. Chart reviewed and timeout done with RN Emile Spine. No issues accessing brachial vein. Good blood return and easy flush. Tip in proper position as evidenced by peaked P waves with no initial deflection. Handoff to RN.

## 2022-11-11 NOTE — PROGRESS NOTES
Speech Language Pathology    SLP arrived to complete tx today. Per nurse, pt not doing well with plan to go home with hospice. Will hold SLP at this time, unless otherwise notified.     Ria Mckeon MA [facility]-SLP #[id number]  Speech-Language Pathologist  11/11/22  11:57am

## 2022-11-11 NOTE — PROGRESS NOTES
Pulmonary Progress Note    Date of Admission: 11/6/2022   LOS: 5 days     CC:  Chief Complaint   Patient presents with    Shortness of Breath           Assessment/Plan          Acute on chronic hypoxemic respiratory failure with SPO2 less than 90%  -From respiratory standpoint doing okay postextubation though she remains an aspiration risk  -Wean supplemental oxygen to goal saturation of >90%    Aspiration into airway/dysphagia  -Failed MBS, aspiration precautions, modified diet  -Airway clearance, Acapella/I-S if able, 3% nebs  -PT/OT     Acute exacerbation of COPD  -Pulmicort, duo nebs, Daliresp  -Increase Solu-Medrol to twice daily given persistent wheezing    CVA  -Neurology following  Cardiac arrest    Ongoing discussions with patient and family. Her greatest desire right now is just to go home. Her current goals of care may be best achieved with home hospice. We will explore this option and discuss. I spent 35 minutes on this case today, >50% was face-to-face in discussion of the diagnosis and the coordination of care in regards to the treatment plan. All questions answered. HPI/Subjective  No acute events overnight. More awake and alert. Is upset she cannot go home. She is also upset with her dietary restrictions. ROS:   No nausea  No Vomiting  No chest pain      Intake/Output Summary (Last 24 hours) at 11/11/2022 1024  Last data filed at 11/11/2022 1000  Gross per 24 hour   Intake 270.97 ml   Output 963 ml   Net -692.03 ml         PHYSICAL EXAM:   Blood pressure (!) 151/67, pulse (!) 103, temperature 98.5 °F (36.9 °C), temperature source Axillary, resp. rate 27, height 5' 3\" (1.6 m), weight 172 lb 6.4 oz (78.2 kg), SpO2 94 %, not currently breastfeeding.'  Gen:  No acute distress. Eyes: PERRL. Anicteric sclera. No conjunctival injection. ENT: No discharge. Posterior oropharynx clear. External appearance of ears and nose normal.  Neck: Trachea midline. No mass   Resp:  No crackles.  No wheezes. No rhonchi. No dullness on percussion. CV: Regular rate. Regular rhythm. No murmur or rub. No edema. GI: Soft, Non-tender. Non-distended. +BS  Skin: Warm, dry, w/o erythema. Lymph: No cervical or supraclavicular LAD. M/S: No cyanosis. No clubbing. Neuro:  no focal neurologic deficit. Moves all extremities  Psych: Awake and alert,  Mood stable.       Medications:    Scheduled Meds:   methylPREDNISolone  40 mg IntraVENous Q12H    amLODIPine  5 mg Oral Daily    lidocaine 1 % injection  5 mL IntraDERmal Once    sodium chloride flush  5-40 mL IntraVENous 2 times per day    budesonide  0.5 mg Nebulization BID    sodium chloride (Inhalant)  15 mL Nebulization TID    [Held by provider] carvedilol  3.125 mg Oral BID WC    ipratropium-albuterol  1 ampule Inhalation 6 times per day    nicotine  1 patch TransDERmal Daily    aspirin  81 mg Oral Daily    Or    aspirin  300 mg Rectal Daily    atorvastatin  40 mg Oral Nightly    lidocaine  1 patch TransDERmal Daily    famotidine (PEPCID) injection  20 mg IntraVENous Daily    Roflumilast  500 mcg Oral Daily    sodium chloride flush  5-40 mL IntraVENous 2 times per day    miconazole   Topical BID       Continuous Infusions:   sodium chloride      sodium chloride         PRN Meds:  LORazepam, sodium chloride flush, sodium chloride, fentanNYL, traMADol, ondansetron **OR** ondansetron, polyethylene glycol, perflutren lipid microspheres, sodium chloride flush, sodium chloride, acetaminophen **OR** acetaminophen, perflutren lipid microspheres, guaiFENesin-dextromethorphan, ipratropium-albuterol, clonazePAM    Labs reviewed:  CBC:   Recent Labs     11/09/22  0450 11/10/22  0510 11/11/22  0440   WBC 9.7 7.4 10.4   HGB 11.8* 11.9* 12.3   HCT 35.3* 35.6* 37.0   MCV 93.0 93.5 94.6    203 232     BMP:   Recent Labs     11/09/22 0450 11/10/22  0510 11/11/22  0440   * 152* 155*   K 4.2 3.9 3.9    107 110   CO2 27 28 29   PHOS 3.7 3.7 4.1   BUN 81* 73* 78* CREATININE 2.1* 1.8* 1.6*     LIVER PROFILE:   No results for input(s): AST, ALT, LIPASE, BILIDIR, BILITOT, ALKPHOS in the last 72 hours. Invalid input(s): AMYLASE,  ALB    PT/INR:   No results for input(s): PROTIME, INR in the last 72 hours. APTT:   No results for input(s): APTT in the last 72 hours. UA:No results for input(s): NITRITE, COLORU, PHUR, LABCAST, WBCUA, RBCUA, MUCUS, TRICHOMONAS, YEAST, BACTERIA, CLARITYU, SPECGRAV, LEUKOCYTESUR, UROBILINOGEN, BILIRUBINUR, BLOODU, GLUCOSEU, AMORPHOUS in the last 72 hours. Invalid input(s): Sonia Boyce  No results for input(s): PH, PCO2, PO2 in the last 72 hours. Films:  Radiology Review:  Pertinent images / reports were reviewed as a part of this visit. Vascular carotid duplex bilateral  Carotid Duplex Study     Demographics      Patient Name      Zach Blackmon      Date of Study     11/09/2022           Gender             Female      Patient Number    [id number]           Date of Birth      1950      Visit Number      [id number]            Age                71 year(s)      Accession Number  [id number]           Room Number        2110      Corporate ID      [id number]              Anthony Mathis, T                                                             CCT      Ordering          Sandra,       Interpreting       Purvi Purdy,   Physician         Dilma Boss MD           Physician          MD     Procedure    Type of Study:      Cerebral:Carotid, VL CAROTID DUPLEX BILATERAL. Vascular Sonographer Report     Indications for Study:CVA. Impressions  Right Impression  Technically limited exam due to heavy breathing motion artifact. The right internal carotid artery appears to have a <50% diameter reducing  stenosis based on velocity criteria. Mild plaque noted in the proximal internal carotid artery. The right vertebral artery demonstrates normal antegrade flow.   Left Impression  Technically limited exam due to heavy breathing motion artifact. The left internal carotid artery appears to have a 0-15% diameter reducing  stenosis based on velocity criteria. The left vertebral artery demonstrates normal antegrade flow. Conclusions      Summary      Technically limited exam due to heavy breathing motion artifact. The right internal carotid artery appears to have a <50% diameter reducing   stenosis based on velocity criteria. The right vertebral artery demonstrates normal antegrade flow. The left internal carotid artery appears to have a <15% diameter reducing   stenosis based on velocity criteria. The left vertebral artery demonstrates normal antegrade flow. Signature      ------------------------------------------------------------------   Electronically signed by Zaria Monk MD (Interpreting   physician) on 11/11/2022 at 08:03 AM   ------------------------------------------------------------------     Patient Status:Routine. Study [place] [id number] - Vascular Lab. Technical Quality:Poor visualization due to movement. Risk Factors      - The patient's risk factor(s) include: chronic lung disease, dyslipidemia      and arterial hypertension.      - The patient has a current/recent (within 1 year) tobacco history. Velocities are measured in cm/s ; Diameters are measured in mm    Carotid Right Measurements  +---------+----+----+-----+----+  ! Location ! PSV ! EDV ! Angle! RI  !  +---------+----+----+-----+----+  ! Prox CCA !124 !40. 1! 60   !0.68!  +---------+----+----+-----+----+  ! Mid CCA  !116 !32. 2!60   !0.72! +---------+----+----+-----+----+  ! Dist CCA !99.8!39. 3!60   !0.61!  +---------+----+----+-----+----+  ! Prox ICA !95. 3!36. 3! 60   !0.62!  +---------+----+----+-----+----+  ! Mid ICA  !95. 3!32. 4!60   !0.66!  +---------+----+----+-----+----+  ! Prox ECA !146 !    !61   !    !  +---------+----+----+-----+----+  ! Vertebral!138 !36. 3! 60   !0.71!   +---------+----+----+-----+----+ - There is antegrade vertebral flow noted on the right side. - Additional Measurements:ICAPSV/CCAPSV 0.82. ICAEDV/CCAEDV 0.91. Carotid Left Measurements  +---------+----+----+-----+----+  ! Location ! PSV ! EDV ! Angle! RI  !  +---------+----+----+-----+----+  ! Prox CCA !103 !30. 4!60   !0.7 ! +---------+----+----+-----+----+  ! Mid CCA  !105 !32. 4!60   !0.69!  +---------+----+----+-----+----+  ! Dist CCA !88.4!32. 4!60   !0.63!  +---------+----+----+-----+----+  ! Prox ICA !124 !36. 3! 60   !0.71! +---------+----+----+-----+----+  ! Mid ICA  !123 !44. 2! 60   !0.64!  +---------+----+----+-----+----+  ! Dist ICA !110 !48. 1! 60   !0.56!  +---------+----+----+-----+----+  ! Prox ECA !584 !    !61   !    !  +---------+----+----+-----+----+  ! Aleksandr Christian. 2!14. 7!60   !0.65!  +---------+----+----+-----+----+      - There is antegrade vertebral flow noted on the left side. - Additional Measurements:ICAPSV/CCAPSV 1.18. ICAEDV/CCAEDV 1.58. Access  CVC   Arterial          PICC     PICC Double Lumen 11/11/22 Right Brachial (Active)   Central Line Being Utilized Yes 11/11/22 1000   Criteria for Appropriate Use Hemodynamically unstable, requiring monitoring lines, vasopressors, or volume resuscitation 11/11/22 1000   Site Assessment Clean, dry & intact 11/11/22 1000   Phlebitis Assessment No symptoms 11/11/22 1000   Infiltration Assessment 0 11/11/22 1000   Extremity Circumference (cm) 28 cm 11/11/22 0030   External Catheter Length (cm) 0 cm 11/11/22 0030   Proximal Lumen Color/Status Purple;Capped;Normal saline locked 11/11/22 1000   Distal Lumen Color/Status Red; Infusing 11/11/22 1000   Line Care Connections checked and tightened 11/11/22 1000   Alcohol Cap Used Yes 11/11/22 1000   Date of Last Dressing Change 11/11/22 11/11/22 1000   Dressing Type Antimicrobial;Securing device;Transparent 11/11/22 1000   Dressing Status Clean, dry & intact 11/11/22 1000   Dressing Intervention New 11/11/22 0030   Number of days: 0 CVC                  Joy  Urinary Catheter 11/06/22 Joy (Active)   Catheter Indications Need for fluid volume management of the critically ill patient in a critical care setting 11/11/22 0740   Site Assessment No urethral drainage 11/11/22 0740   Urine Color Yellow 11/11/22 0740   Urine Appearance Clear 11/11/22 0740   Urine Odor Other (Comment) 11/11/22 0740   Collection Container Standard 11/11/22 0740   Securement Method Securing device (Describe) 11/11/22 0740   Catheter Care Completed Yes 11/11/22 0740   Catheter Best Practices  Drainage tube clipped to bed;Catheter secured to thigh; Tamper seal intact; Bag below bladder;Bag not on floor; Lack of dependent loop in tubing;Drainage bag less than half full 11/11/22 0740   Status Draining;Patent 11/11/22 0740   Output (mL) 38 mL 11/11/22 1000   Number of days: 4         Thank you for this consult,    Leroy Vickers MD  [facility] Pulmonary, Critical Care, and Sleep Medicine

## 2022-11-11 NOTE — PROGRESS NOTES
Progress Note  Admit Date: 11/6/2022      PCP: Naif Venegas MD     CC: F/U for shortness of breath    Days in hospital:  5    SUBJECTIVE / Interval History:  Patient crying stating she does not want thing more done. She wants to go home. Tearful      Allergies  Patient has no known allergies. Medications    Scheduled Meds:   methylPREDNISolone  40 mg IntraVENous Q12H    amLODIPine  5 mg Oral Daily    lidocaine 1 % injection  5 mL IntraDERmal Once    sodium chloride flush  5-40 mL IntraVENous 2 times per day    budesonide  0.5 mg Nebulization BID    sodium chloride (Inhalant)  15 mL Nebulization TID    [Held by provider] carvedilol  3.125 mg Oral BID WC    ipratropium-albuterol  1 ampule Inhalation 6 times per day    nicotine  1 patch TransDERmal Daily    aspirin  81 mg Oral Daily    Or    aspirin  300 mg Rectal Daily    atorvastatin  40 mg Oral Nightly    lidocaine  1 patch TransDERmal Daily    famotidine (PEPCID) injection  20 mg IntraVENous Daily    Roflumilast  500 mcg Oral Daily    sodium chloride flush  5-40 mL IntraVENous 2 times per day    miconazole   Topical BID     Continuous Infusions:   sodium chloride      sodium chloride         PRN Meds:  LORazepam, sodium chloride flush, sodium chloride, fentanNYL, traMADol, ondansetron **OR** ondansetron, polyethylene glycol, perflutren lipid microspheres, sodium chloride flush, sodium chloride, acetaminophen **OR** acetaminophen, perflutren lipid microspheres, guaiFENesin-dextromethorphan, ipratropium-albuterol, clonazePAM    Vitals    BP (!) 143/74   Pulse (!) 104   Temp 98.1 °F (36.7 °C) (Axillary)   Resp (!) 31   Ht 5' 3\" (1.6 m)   Wt 172 lb 6.4 oz (78.2 kg)   SpO2 96%   BMI 30.54 kg/m²     Exam:    Gen: anxious  Eyes: PERRL. No sclera icterus. No conjunctival injection. ENT: No discharge. Pharynx clear. External appearance of ears and nose normal.  Neck: Trachea midline. No obvious mass.     Resp: course rhonch with diffuse inspiratory expiratory wheezes  CV: Regular rate. Regular rhythm. No murmur or rub. No edema. GI: Non-tender. Non-distended. No hernia. Skin: Warm, dry, normal texture and turgor. No nodule on exposed extremities. Lymph: No cervical LAD. No supraclavicular LAD. M/S: No cyanosis. No clubbing. No joint deformity. Neuro: moving arms and legs. Dysarthric speech. Left facial droop. Left sided weakness  Psych: anxious     Data    LABS  CBC:   Recent Labs     11/09/22  0450 11/10/22  0510 11/11/22  0440   WBC 9.7 7.4 10.4   HGB 11.8* 11.9* 12.3   HCT 35.3* 35.6* 37.0   MCV 93.0 93.5 94.6    203 232       BMP:   Recent Labs     11/09/22  0450 11/10/22  0510 11/11/22  0440   * 152* 155*   K 4.2 3.9 3.9    107 110   CO2 27 28 29   PHOS 3.7 3.7 4.1   BUN 81* 73* 78*   CREATININE 2.1* 1.8* 1.6*   GLUCOSE 112* 123* 129*       POC GLUCOSE:    Recent Labs     11/08/22  1711 11/09/22  0810 11/09/22  1213 11/09/22  1610   POCGLU 141* 125* 148* 168*       LIVER PROFILE:   Recent Labs     11/09/22  0450 11/10/22  0510 11/11/22  0440   LABALBU 4.3 4.3 4.5       PT/INR:   No results for input(s): PROTIME, INR in the last 72 hours. APTT:   No results for input(s): APTT in the last 72 hours. UA:No results for input(s): NITRITE, COLORU, PHUR, LABCAST, WBCUA, RBCUA, MUCUS, TRICHOMONAS, YEAST, BACTERIA, CLARITYU, SPECGRAV, LEUKOCYTESUR, UROBILINOGEN, BILIRUBINUR, BLOODU, GLUCOSEU, KETUA, AMORPHOUS in the last 72 hours. Microbiology:  Wound Culture: No results for input(s): WNDABS, ORG in the last 72 hours. Invalid input(s):  LABGRAM  Nasal Culture: No results for input(s): ORG, MRSAPCR in the last 72 hours. Blood Culture: No results for input(s): BC, BLOODCULT2 in the last 72 hours. Fungal Culture:   No results for input(s): FUNGSM in the last 72 hours. No results for input(s): FUNCXBLD in the last 72 hours.   CSF Culture:  No results for input(s): COLORCSF, APPEARCSF, CFTUBE, CLOTCSF, WBCCSF, RBCCSF, NEUTCSF, NUMCELLSCSF, LYMPHSCSF, MONOCSF, GLUCCSF, VOLCSF in the last 72 hours. Respiratory Culture:  No results for input(s): Mildred Lord in the last 72 hours. AFB:No results for input(s): AFBSMEAR in the last 72 hours. Urine Culture  No results for input(s): LABURIN in the last 72 hours. RADIOLOGY:    FL MODIFIED BARIUM SWALLOW W VIDEO   Final Result   Aspiration and penetration was seen      Please see separate speech pathology report for full discussion of findings   and recommendations. RECOMMENDATIONS:   Unavailable         Vascular carotid duplex bilateral   Final Result      XR CHEST PORTABLE   Final Result   Endotracheal tube and nasogastric tube have been removed. Right PICC line is   still present. Patient is rotated to the right distorting the mediastinum and grecia. There   does appear to be increasing opacities in the right lower chest and possible   volume loss. May represent a combination of edema and atelectasis. MRI BRAIN WO CONTRAST   Final Result   Severely motion degraded exam.      Acute infarct within the right frontal lobe as well as acute microinfarcts   within the left occipital lobe. The findings were sent to the Radiology Results [id number] at 6:26   pm on 11/8/2022 to be communicated to a licensed caregiver. CT HEAD WO CONTRAST   Final Result   Addendum (preliminary) 1 of 1   ADDENDUM:   Critical results were called by Dr. Leyla Dixon to [place] on 11/8/2022 at    18:01. Final   Findings suggestive of right MCA distribution stroke. Consider CT angiogram   or MRI as clinically warranted. Negative for acute bleed midline shift mass effect. VL Extremity Venous Bilateral   Final Result      CTA HEAD NECK W CONTRAST   Final Result   Severely motion degraded exam without large vessel occlusion in the head or   neck.          CT HEAD WO CONTRAST   Final Result   Addendum (preliminary) 1 of 1   ADDENDUM:   Results were called to Dr. Marc Jackson at 6:23 p.m. Mayi [place] Final   No evidence of acute intracranial process. Remote bilateral cerebellar hemisphere infarcts noted along with a small   remote lacunar infarct of the left caudate head. XR CHEST PORTABLE   Final Result   The endotracheal tube ends appropriately above the marita and below the   clavicular heads. The nasogastric tube ends within the stomach. XR CHEST PORTABLE   Final Result   Mild cardiomegaly and mild pulmonary edema. 1.5 cm slightly dense nodule right upper lobe which is unchanged             CONSULTS:    IP CONSULT TO HEART FAILURE NURSE/COORDINATOR  IP CONSULT TO DIETITIAN  IP CONSULT TO CARDIOLOGY  IP CONSULT TO CRITICAL CARE  IP CONSULT TO NEPHROLOGY  IP CONSULT TO NEUROLOGY  IP CONSULT TO PALLIATIVE CARE  IP CONSULT TO PHYSICAL MEDICINE REHAB  IP CONSULT TO PHYSICAL MEDICINE REHAB  IP CONSULT TO HOSPICE    ASSESSMENT AND PLAN:      Active Problems:    Congestive heart failure (HCC)    NSTEMI (non-ST elevated myocardial infarction) ([facility] [address]    PEYTON (acute kidney injury) ([facility] [address]    Elevated troponin    COPD exacerbation (HCC)    Acute hypoxemic respiratory failure (HCC)    Acute pulmonary edema (HCC)  Resolved Problems:    * No resolved hospital problems. *    Patient is a 20-year-old female with a past medical history of COPD, hypertension, hyper lipidemia who was admitted with CHF exacerbation with possible NSTEMI and COPD exacerbation. Hospital course was complicated by bradycardia followed by CODE BLUE and patient was transferred to the ICU. With elevated D-dimer demand ischemia shortness of breath there is concern for possible PE. Patient is started on heparin drip. Acute on chronic hypoxic respiratory failure-vent management per pulm, possible extubation  Pulmonary status tenuous. She is at risk for silent aspiration. I am concerned about potential need for reintubation. Appears patient is interested in hospice.   I think this is reasonable  Hospice consult placed  I think her long-term prognosis is poor    Cardiac arrest-cause unclear  -Bradycardia followed by PEA cardiac arrest  -With elevated D-dimer unclear if patient had a PE(presentation unlike cardiac arrest for PE)  -Already on a heparin drip for NSTEMI prior to code  -Venous duplex ordered negative for any DVT  -Unable to get a CTA chest due to PEYTON. -Echo shows some RV dilatation      Possible diastolic CHF exacerbation-on admission. Lasix holiday today  Pushing free water as her sodium is starting to climb  I am not convinced she is going to be able to take enough free water orally to keep her sodium level in check    Acute right frontal and left occipital stroke  Continue antiplatelet therapy aspirin  Patient has significant left-sided weakness along with dysphagia  PT OT working with the patient  Unable to do CTA right now due to renal failure  Hospice consult    Possible NSTEMI  Medical treatment for now off of heparin due to acute stroke      Acute kidney injury-urine output improved, creatinine still trending  -Monitor HOWARD  -Nephrology consulted    Possible COPD exacerbation  -Continue steroids with inhalers/nebulizers  -Continue doxycycline    Hypertension  -Hold blood pressure medications                DVT Prophylaxis: Heparin  Diet: ADULT DIET; Dysphagia - Pureed; Low Sodium (2 gm); Moderately Thick (Honey); 2000 ml  Code Status: DNR-CC    PT/OT Eval Status:  ordered  Speech therapy ordered. Discharge plan -continue care.   Hospice evaluation      Ashlee Bedolla MD

## 2022-11-11 NOTE — CARE COORDINATION
[address] [place] [address]  Palliative Care   Progress Note    NAME:  Kelly [facility] RECORD NUMBER:  [id number]  AGE: 71 y.o. GENDER: female  : 1950  TODAY'S DATE:  2022    Subjective: Patient keeps saying she wants to die no longer wants aggressive care. She wants to drink regular water and pop. Objective:    Vitals:    22 1200   BP:    Pulse: (!) 113   Resp: 18   Temp: 98.1 °F (36.7 °C)   SpO2: 95%     Lab Results   Component Value Date    WBC 10.4 2022    HGB 12.3 2022    HCT 37.0 2022    MCV 94.6 2022     2022     Lab Results   Component Value Date    CREATININE 1.6 (H) 2022    BUN 78 (H) 2022     (H) 2022    K 3.9 2022     2022    CO2 29 2022     Lab Results   Component Value Date     (H) 2022     (H) 2022    ALKPHOS 181 (H) 2022    BILITOT 0.5 2022       Plan: I have spoken to patient about who she would like to be her decision maker, we discussed difficulty her daughter is having and did she want her nieces to be HPOA, or daughter or grandson. She agreed she wants her nieces to be HPOA, form filled out patient signed and form was witnessed. I have discussed hospice care with her niece Ariana Buck and she agrees with ann. I did explain above to her daughter, she is now sitting up eating and interacting with her mother. Plan is to move patient today to inpatient. Referral faxed to [facility]  Dr Brando Partida aware of above in agreement with patients decision. Code Status: DNR-CC  Discharge Environment:  [x] Hospice Consult Agency: List provided chose [address]   [x] Inpatient Hospice    Teaching Time:  1hours  30 min     I will continue to follow Ms. Almanzar's care as needed. Thank you for allowing me to participate in the care of Ms. Almanzar .      Electronically signed by Prudy Nj, RN, BSN,CHPN on 2022 at 12:55 PM  Palliative Care Nurse [facility]  Office: [phone]

## 2022-11-11 NOTE — CARE COORDINATION
[facility]  Palliative Care   Progress Note    NAME:  Kelly [facility] RECORD NUMBER:  [id number]  AGE: 71 y.o. GENDER: female  : 1950  TODAY'S DATE:  2022    Subjective:          Objective:    Vitals:    22 0800   BP: (!) 159/81   Pulse: (!) 101   Resp: 29   Temp:    SpO2:      Lab Results   Component Value Date    WBC 10.4 2022    HGB 12.3 2022    HCT 37.0 2022    MCV 94.6 2022     2022     Lab Results   Component Value Date    CREATININE 1.6 (H) 2022    BUN 78 (H) 2022     (H) 2022    K 3.9 2022     2022    CO2 29 2022     Lab Results   Component Value Date     (H) 2022     (H) 2022    ALKPHOS 181 (H) 2022    BILITOT 0.5 2022       Plan:      Code Status: Limited  Discharge Environment:  [] Hospice Consult Agency:  [] Inpatient Hospice    [] Home with  [address]   [] ECF with Hospice  [] ECF skilled care with Hospice to follow   [] Other:    Teaching Time:  {NUMBERS 0-10:48463}hours  {NUMBERS; 1-100 BY 10:81454}    I will continue to follow Ms. Almanzar's care as needed. Thank you for allowing me to participate in the care of Ms. Almanzar .      Electronically signed by Margaret Bartlett, RN, BSN,CHPN on 2022 at 8:32 AM  Palliative Care Nurse [facility]  Office: [phone]

## 2022-11-11 NOTE — PROGRESS NOTES
Palliative care discussed with patient and daughter about goals of care moving forward and code status. Patient decided she wanted to be a limited code. Subsequently, patients daughter broke down, hysterically crying. Patients daughter called family members who also came in. Daughter stepped out and RN was informed that daughter is an addict. Daughter returned to room, other family left. Daughter expressed she wanted to take pills to help her sleep. RN witnessed daughter dump a handful of pills into her hand. RN was concerned and asked daughter if she was having suicidal thoughts or has ever had suicidal thoughts. Daughter expressed she was feeling suicidal, has history of substance abuse and was worried something might happen to her. RN brought daughter to the ED for safety and workup. Family aware of situation.

## 2022-11-11 NOTE — PROGRESS NOTES
Cesilia Lyons (ICU Manager) discussed with patients daughter she can no longer stay the night in patients room because of safety issues and hospital policy. Conversation witnessed by Pa Murphy (manager). Patients daughter verbalized understanding.

## 2022-11-11 NOTE — PROGRESS NOTES
Lynna Landau with palliative care discussed with patient the option of hospice and due to the current situation with patients daughter, making her nieces the decision makers moving forward. Patient verbalizes \"I just want to go home\" and expressed that she wants to go home with hospice. When ask directly if it was okay for nieces to be the decision makers moving forward the patient said \"yes\". This whole conversations was witnessed by Tammy Handy (manager) and patients LITTLE Frazier.

## 2022-11-11 NOTE — PROGRESS NOTES
4 Eyes Skin Assessment     NAME:  Otilio Suarez  YOB: 1950  MEDICAL RECORD NUMBER:  [id number]    The patient is being assess for  Shift Handoff    I agree that 2 RN's have performed a thorough Head to Toe Skin Assessment on the patient. ALL assessment sites listed below have been assessed. Areas assessed by both nurses:    Head, Face, Ears, Shoulders, Back, Chest, Arms, Elbows, Hands, Sacrum. Buttock, Coccyx, Ischium, and Legs. Feet and Heels        Does the Patient have a Wound?  No noted wound(s)       Ajit Prevention initiated:  Yes   Wound Care Orders initiated:  No    Pressure Injury (Stage 3,4, Unstageable, DTI, NWPT, and Complex wounds) if present place referral/consult order under [de-identified] No    New and Established Ostomies if present place consult order under : No      Nurse 1 eSignature: Electronically signed by Ingris Mckeon RN on 11/11/22 at 7:47 AM EST    **SHARE this note so that the co-signing nurse is able to place an eSignature**    Nurse 2 eSignature: {Esignature:[id number]}

## 2022-11-11 NOTE — CARE COORDINATION
DISCHARGE SUMMARY     DATE OF DISCHARGE: 11/11/2022    DISCHARGE DESTINATION:     FACILITY  Name: [facility]  Address:  [address], [address]   Phone:  [phone]  Program: inpatient hospice at [address]: [address] [place] [address] Name:  Eric [address] up Time: 5pm  Phone Number: [phone]    COMMENTS: Family in agreement with transition to inpatient hospice unit. No further needs noted unless indicated by patient, family and/or medical staff. Respectfully submitted,    JOSE Dwyer  [facility]   [phone]    Electronically signed by JOSE Stewart on 11/11/2022 at 4:08 PM

## 2022-11-11 NOTE — PROGRESS NOTES
Occupational Therapy Discharge  Gabriel Wade    Per chart, pt has chosen to pursue hospice care. Will sign off.      Electronically signed by Skyler Rocha OT on 11/11/22 at 1:10 PM EST

## 2022-12-09 PROBLEM — R77.8 ELEVATED TROPONIN: Status: RESOLVED | Noted: 2022-11-09 | Resolved: 2022-12-09

## 2022-12-15 NOTE — DISCHARGE SUMMARY
Hospital Medicine Discharge Summary    Patient ID: Corey Webb      Patient's PCP: Payton Rossi MD    Admit Date: 11/6/2022     Discharge Date: 11/11/2022      Admitting Provider: Emily Walsh DO     Discharge Provider: Iain You MD     Discharge Diagnoses: Active Hospital Problems    Diagnosis     PEYTON (acute kidney injury) ([facility] [address] [N17.9]      Priority: Medium    Congestive heart failure ([address] [I50.9]      Priority: Medium    NSTEMI (non-ST elevated myocardial infarction) ([address] [I21.4]      Priority: Medium    Acute pulmonary edema ([address] [J81.0]     Acute hypoxemic respiratory failure ([address] [J96.01]     COPD exacerbation ([address] [J44.1]        The patient was seen and examined on day of discharge and this discharge summary is in conjunction with any daily progress note from day of discharge. Hospital Course: Patient is a 44-year-old female with a past medical history of COPD, hypertension, hyper lipidemia who was admitted with CHF exacerbation with possible NSTEMI and COPD exacerbation. Hospital course was complicated by bradycardia followed by [facility] and patient was transferred to the ICU. With elevated D-dimer demand ischemia shortness of breath there is concern for possible PE. Patient is started on heparin drip      Acute on chronic hypoxic respiratory failure-vent management per pulm, possible extubation  Pulmonary status tenuous. She is at risk for silent aspiration. I am concerned about potential need for reintubation. Appears patient is interested in hospice. I think this is reasonable  Hospice consult placed  I think her long-term prognosis is poor     Cardiac arrest-cause unclear  -Bradycardia followed by PEA cardiac arrest  -With elevated D-dimer unclear if patient had a PE(presentation unlike cardiac arrest for PE)  -Already on a heparin drip for NSTEMI prior to code  -Venous duplex ordered negative for any DVT  -Unable to get a CTA chest due to PEYTON.   -Echo shows some RV dilatation        Possible diastolic CHF exacerbation-on admission. Lasix holiday today  Pushing free water as her sodium is starting to climb  I am not convinced she is going to be able to take enough free water orally to keep her sodium level in check     Acute right frontal and left occipital stroke  Continue antiplatelet therapy aspirin  Patient has significant left-sided weakness along with dysphagia  PT OT working with the patient  Unable to do CTA right now due to renal failure  Hospice consulted     Possible NSTEMI  Medical treatment for now off of heparin due to acute stroke        Acute kidney injury-urine output improved, creatinine still trending  -Monitor HOWARD  -Nephrology consulted     Possible COPD exacerbation  -Continue steroids with inhalers/nebulizers  -Continue doxycycline     Hypertension  -Hold blood pressure medications    Physical Exam Performed:     /66   Pulse 96   Temp 98.1 °F (36.7 °C) (Axillary)   Resp 24   Ht 5' 3\" (1.6 m)   Wt 172 lb 6.4 oz (78.2 kg)   SpO2 96%   BMI 30.54 kg/m²       General appearance:  No apparent distress, appears stated age and cooperative. HEENT:  Normal cephalic, atraumatic without obvious deformity. Pupils equal, round, and reactive to light. Extra ocular muscles intact. Conjunctivae/corneas clear. Neck: Supple, with full range of motion. No jugular venous distention. Trachea midline. Respiratory:  Normal respiratory effort. Clear to auscultation, bilaterally without Rales/Wheezes/Rhonchi. Cardiovascular:  Regular rate and rhythm with normal S1/S2 without murmurs, rubs or gallops. Abdomen: Soft, non-tender, non-distended with normal bowel sounds. Musculoskeletal:  No clubbing, cyanosis or edema bilaterally. Full range of motion without deformity. Skin: Skin color, texture, turgor normal.  No rashes or lesions. Neurologic:  Neurovascularly intact without any focal sensory/motor deficits.  Cranial nerves: II-XII intact, grossly non-focal.  Psychiatric:  Alert and oriented, thought content appropriate, normal insight  Capillary Refill: Brisk,< 3 seconds   Peripheral Pulses: +2 palpable, equal bilaterally       Labs: For convenience and continuity at follow-up the following most recent labs are provided:      CBC:    Lab Results   Component Value Date/Time    WBC 10.4 11/11/2022 04:40 AM    HGB 12.3 11/11/2022 04:40 AM    HCT 37.0 11/11/2022 04:40 AM     11/11/2022 04:40 AM       Renal:    Lab Results   Component Value Date/Time     11/11/2022 04:40 AM    K 3.9 11/11/2022 04:40 AM    K 4.5 11/06/2022 04:47 PM     11/11/2022 04:40 AM    CO2 29 11/11/2022 04:40 AM    BUN 78 11/11/2022 04:40 AM    CREATININE 1.6 11/11/2022 04:40 AM    CALCIUM 10.2 11/11/2022 04:40 AM    PHOS 4.1 11/11/2022 04:40 AM         Significant Diagnostic Studies    Radiology:   FL MODIFIED BARIUM SWALLOW W VIDEO   Final Result   Aspiration and penetration was seen      Please see separate speech pathology report for full discussion of findings   and recommendations. RECOMMENDATIONS:   Unavailable         Vascular carotid duplex bilateral   Final Result      XR CHEST PORTABLE   Final Result   Endotracheal tube and nasogastric tube have been removed. Right PICC line is   still present. Patient is rotated to the right distorting the mediastinum and grecia. There   does appear to be increasing opacities in the right lower chest and possible   volume loss. May represent a combination of edema and atelectasis. MRI BRAIN WO CONTRAST   Final Result   Severely motion degraded exam.      Acute infarct within the right frontal lobe as well as acute microinfarcts   within the left occipital lobe. The findings were sent to the Radiology Results [id number] at 6:26   pm on 11/8/2022 to be communicated to a licensed caregiver.          CT HEAD WO CONTRAST   Final Result   Addendum (preliminary) 1 of 1   ADDENDUM:   Critical results were called by Dr. Avila Barry on 11/8/2022 at    18:01. Final   Findings suggestive of right MCA distribution stroke. Consider CT angiogram   or MRI as clinically warranted. Negative for acute bleed midline shift mass effect. VL Extremity Venous Bilateral   Final Result      CTA HEAD NECK W CONTRAST   Final Result   Severely motion degraded exam without large vessel occlusion in the head or   neck. CT HEAD WO CONTRAST   Final Result   Addendum (preliminary) 1 of 1   ADDENDUM:   Results were called to Dr. Pedro Dobbins at 6:23 p.m. Boris Patella Final   No evidence of acute intracranial process. Remote bilateral cerebellar hemisphere infarcts noted along with a small   remote lacunar infarct of the left caudate head. XR CHEST PORTABLE   Final Result   The endotracheal tube ends appropriately above the marita and below the   clavicular heads. The nasogastric tube ends within the stomach. XR CHEST PORTABLE   Final Result   Mild cardiomegaly and mild pulmonary edema. 1.5 cm slightly dense nodule right upper lobe which is unchanged                Consults:     IP CONSULT TO HEART FAILURE NURSE/COORDINATOR  IP CONSULT TO DIETITIAN  IP CONSULT TO CARDIOLOGY  IP CONSULT TO CRITICAL CARE  IP CONSULT TO NEPHROLOGY  IP CONSULT TO NEUROLOGY  IP CONSULT TO PALLIATIVE CARE  IP CONSULT TO PHYSICAL MEDICINE REHAB  IP CONSULT TO PHYSICAL MEDICINE REHAB  IP CONSULT TO HOSPICE    Disposition:  hospice     Condition at Discharge: Terminal    Discharge Instructions/Follow-up:  n/a    Code Status:  Prior     Activity: activity as tolerated    Diet:  comfort foods      Discharge Medications:     Discharge Medication List as of 11/11/2022  5:37 PM             Details   methylPREDNISolone (MEDROL, TREVOR,) 4 MG tablet Take by mouth., Disp-1 kit, R-0Print      OXYGEN Inhale into the lungs Patient uses as needed. She doesn't know how much she uses. Historical Med      albuterol sulfate  (90 Base) MCG/ACT inhaler Inhale 2 puffs into the lungs 4 times daily as needed for Wheezing, Disp-1 Inhaler,R-0Print      albuterol (PROVENTIL) (5 MG/ML) 0.5% nebulizer solution Take 0.5 mLs by nebulization every 6 hours as needed for Wheezing, Disp-120 each,R-0Print      Cholecalciferol (VITAMIN D3) 91430 units CAPS TAKE ONE CAPSULE BY MOUTH ONCE A WEEK, Disp-4 capsule, R-3Normal      Roflumilast (DALIRESP) 500 MCG tablet Take 500 mcg by mouth daily, Disp-30 tablet, R-2Normal      Fluticasone Furoate-Vilanterol (BREO ELLIPTA IN) Inhale into the lungsHistorical Med      Umeclidinium Bromide (INCRUSE ELLIPTA IN) Inhale into the lungsHistorical Med      amLODIPine (NORVASC) 5 MG tablet Take 5 mg by mouth dailyHistorical Med      Nicotine (NICODERM CQ TD) Place onto the skinHistorical Med      vitamin D (CHOLECALCIFEROL) 1000 UNIT TABS tablet Take 1 tablet by mouth daily, Disp-30 tablet, R-1Print      Multiple Vitamins-Minerals (CENTROVITE) TABS Take 1 tablet by mouth dailyHistorical Med      Calcium Carbonate-Vitamin D 500-400 MG-UNIT TABS Take 1 tablet by mouth dailyHistorical Med      acetaminophen (APAP EXTRA STRENGTH) 500 MG tablet Take 2 tablets by mouth every 6 hours as needed for Pain, Disp-120 tablet, R-2      atorvastatin (LIPITOR) 40 MG tablet Take 40 mg by mouth daily             Time Spent on discharge: 29 m in the examination, evaluation, counseling and review of medications and discharge plan. Signed:    Goldie Ellsworth MD   12/15/2022      Thank you Pernell Zavala MD for the opportunity to be involved in this patient's care. If you have any questions or concerns, please feel free to contact me at [phone].

## 2023-05-31 NOTE — PROGRESS NOTES
New patient - recently dx with astham- sees dr izaguirre.     Needs to get established with pcp.    [facility]                                               Progress note    CC: SOB, cardiac arrest.  Summary:   Danuta Zaragoza is being seen by nephrology for Acute kidney injury (PEYTON). She is a 71 y.o. female with a PMH significant for COPD (continues to smoke), CAD, CVA, hypertension who presented to the ED 11/6/2022 with complaints progressively worsening shortness of breath. She is not known to have CHF prior to this admission. On 11/6/2022 she went into cardiac arrest with asystole needing CPR for 3 minutes. Since then her Cr has increased from 1.2 at admission to 2.5 at time of consult. Interval History  - seen at bedside  - /69  - extubated yesterday, left facial droop, has a right sided CVA. - Cr 2.1 today, slowly trending down  - made 1700 mL urine yesterday.  - wheezing on exam    Plan:   - lasix IV 80 mg again today for volume management  - CVA management per neurology. - PEYTON improving. Romain Yuen MD  [facility]  Office: [phone]    Assessment:   PEYTON:  - baseline Cr 0.8-1.0  - Cr on admission was at baseline but she unfortunately had PEA cardiac arrest.  - Cr increased from 1.2 to 2.5 within 24 hours  - oligoanuric now  - PEYTON likely 2/2 ATN from hypoperfusion. CHF:  - admitted with SOB, elevated BNP  - pulmonary edema on chest imaging.  - was getting diuresis after admission  - echo 11/7/2022 with LVEF 60%, indeterminate diastolic function, RV dilated. ROS:   Positives Listed Bold. All other remaining systems are negative. Constitutional:  fever, chills, weakness, weight change, fatigue,      Skin:  rash, pruritus, hair loss, bruising, dry skin, petechiae. Head, Face, Neck   headaches, swelling,  cervical adenopathy.      Respiratory: shortness of breath, cough, or wheezing  Cardiovascular: chest pain, palpitations, dizzy, edema  Gastrointestinal: nausea, vomiting, diarrhea, constipation,belly pain    Yellow skin, blood in stool  Musculoskeletal: back pain, muscle weakness, gait problems,       joint pain or swelling. Genitourinary:  dysuria, poor urine flow, flank pain, blood in urine  Neurologic:  vertigo, TIA'S, syncope, seizures, focal weakness  Psychosocial:  insomnia, anxiety, or depression. Additional positive findings: -      PMH:   Past medical history, surgical history, social history, family history are reviewed and updated as appropriate. Reviewed current medication list.   Allergies reviewed and updated as needed. PE:   Vitals:    11/09/22 1100   BP: 134/69   Pulse: 90   Resp: 20   Temp:    SpO2: 100%       General appearance: in no acute distress, comfortable, communicative, awake   HEENT: no icterus, EOM intact, trachea midline. Neck : no masses, appears symmetrical and no JVD appreciated. Respiratory: Respiratory effort normal, bilateral equal chest rise. + wheeze, no crackles  Cardiovascular: Ausculation shows RRR and  no edema   Abdomen: abdomen is soft, non distended, no masses, no pain with palpation. Musculoskeletal:  no joint swelling, no deformity, strength grossly normal.   Skin: no rashes, no induration, no tightening, no jaundice   Neuro:   Follows commands, moves all extremities spontaneously       Lab Results   Component Value Date    CREATININE 2.1 (H) 11/09/2022    BUN 81 (HH) 11/09/2022     (H) 11/09/2022    K 4.2 11/09/2022     11/09/2022    CO2 27 11/09/2022      Lab Results   Component Value Date    WBC 9.7 11/09/2022    HGB 11.8 (L) 11/09/2022    HCT 35.3 (L) 11/09/2022    MCV 93.0 11/09/2022     11/09/2022     Lab Results   Component Value Date    CALCIUM 9.5 11/09/2022    CAION 1.15 10/14/2015    PHOS 3.7 11/09/2022